# Patient Record
Sex: MALE | Race: WHITE | Employment: FULL TIME | URBAN - METROPOLITAN AREA
[De-identification: names, ages, dates, MRNs, and addresses within clinical notes are randomized per-mention and may not be internally consistent; named-entity substitution may affect disease eponyms.]

---

## 2018-04-27 ENCOUNTER — HOSPITAL ENCOUNTER (OUTPATIENT)
Age: 58
Discharge: HOME | End: 2018-04-29
Payer: COMMERCIAL

## 2018-04-27 LAB
ALBUMIN SERPL-MCNC: 4.4 G/DL (ref 3.5–5.2)
ALP BLD-CCNC: 34 U/L (ref 40–129)
ALT SERPL-CCNC: 22 U/L (ref 0–40)
ANION GAP SERPL CALCULATED.3IONS-SCNC: 15 MMOL/L (ref 7–16)
AST SERPL-CCNC: 26 U/L (ref 0–39)
BILIRUB SERPL-MCNC: 0.4 MG/DL (ref 0–1.2)
BILIRUBIN URINE: NEGATIVE
BLOOD, URINE: NEGATIVE
BUN BLDV-MCNC: 13 MG/DL (ref 6–20)
CALCIUM SERPL-MCNC: 9.7 MG/DL (ref 8.6–10.2)
CHLORIDE BLD-SCNC: 94 MMOL/L (ref 98–107)
CLARITY: CLEAR
CO2: 30 MMOL/L (ref 22–29)
COLOR: YELLOW
CREAT SERPL-MCNC: 0.9 MG/DL (ref 0.7–1.2)
GFR AFRICAN AMERICAN: >60
GFR NON-AFRICAN AMERICAN: >60 ML/MIN/1.73
GLUCOSE BLD-MCNC: 102 MG/DL (ref 74–109)
GLUCOSE URINE: NEGATIVE MG/DL
HCT VFR BLD CALC: 39.7 % (ref 37–54)
HEMOGLOBIN: 14.2 G/DL (ref 12.5–16.5)
INR BLD: 1.1
KETONES, URINE: NEGATIVE MG/DL
LEUKOCYTE ESTERASE, URINE: NEGATIVE
MCH RBC QN AUTO: 33.9 PG (ref 26–35)
MCHC RBC AUTO-ENTMCNC: 35.8 % (ref 32–34.5)
MCV RBC AUTO: 94.7 FL (ref 80–99.9)
NITRITE, URINE: NEGATIVE
PDW BLD-RTO: 12.1 FL (ref 11.5–15)
PH UA: 6.5 (ref 5–9)
PLATELET # BLD: 461 E9/L (ref 130–450)
PMV BLD AUTO: 9.5 FL (ref 7–12)
POTASSIUM SERPL-SCNC: 3.4 MMOL/L (ref 3.5–5)
PROTEIN UA: NEGATIVE MG/DL
PROTHROMBIN TIME: 12.3 SEC (ref 9.3–12.4)
RBC # BLD: 4.19 E12/L (ref 3.8–5.8)
SODIUM BLD-SCNC: 139 MMOL/L (ref 132–146)
SPECIFIC GRAVITY UA: 1.01 (ref 1–1.03)
TOTAL PROTEIN: 7.1 G/DL (ref 6.4–8.3)
UROBILINOGEN, URINE: 0.2 E.U./DL
WBC # BLD: 10.7 E9/L (ref 4.5–11.5)

## 2018-04-27 PROCEDURE — 87088 URINE BACTERIA CULTURE: CPT

## 2018-04-27 PROCEDURE — 85027 COMPLETE CBC AUTOMATED: CPT

## 2018-04-27 PROCEDURE — 80053 COMPREHEN METABOLIC PANEL: CPT

## 2018-04-27 PROCEDURE — 81003 URINALYSIS AUTO W/O SCOPE: CPT

## 2018-04-27 PROCEDURE — 85610 PROTHROMBIN TIME: CPT

## 2018-04-29 LAB — URINE CULTURE, ROUTINE: NORMAL

## 2018-09-21 ENCOUNTER — HOSPITAL ENCOUNTER (INPATIENT)
Age: 58
LOS: 4 days | Discharge: HOME | DRG: 603 | End: 2018-09-25
Attending: EMERGENCY MEDICINE | Admitting: INTERNAL MEDICINE
Payer: COMMERCIAL

## 2018-09-21 ENCOUNTER — APPOINTMENT (OUTPATIENT)
Dept: GENERAL RADIOLOGY | Age: 58
DRG: 603 | End: 2018-09-21
Payer: COMMERCIAL

## 2018-09-21 ENCOUNTER — APPOINTMENT (OUTPATIENT)
Dept: ULTRASOUND IMAGING | Age: 58
DRG: 603 | End: 2018-09-21
Payer: COMMERCIAL

## 2018-09-21 DIAGNOSIS — E87.6 HYPOKALEMIA: ICD-10-CM

## 2018-09-21 DIAGNOSIS — L03.114 LEFT ARM CELLULITIS: Primary | ICD-10-CM

## 2018-09-21 PROBLEM — L03.90 CELLULITIS: Status: ACTIVE | Noted: 2018-09-21

## 2018-09-21 LAB
ANION GAP SERPL CALCULATED.3IONS-SCNC: 16 MMOL/L (ref 7–16)
BASOPHILS ABSOLUTE: 0 E9/L (ref 0–0.2)
BASOPHILS RELATIVE PERCENT: 0.3 % (ref 0–2)
BUN BLDV-MCNC: 14 MG/DL (ref 6–20)
CALCIUM SERPL-MCNC: 11 MG/DL (ref 8.6–10.2)
CHLORIDE BLD-SCNC: 91 MMOL/L (ref 98–107)
CO2: 29 MMOL/L (ref 22–29)
CREAT SERPL-MCNC: 0.8 MG/DL (ref 0.7–1.2)
EOSINOPHILS ABSOLUTE: 0.29 E9/L (ref 0.05–0.5)
EOSINOPHILS RELATIVE PERCENT: 1.8 % (ref 0–6)
GFR AFRICAN AMERICAN: >60
GFR NON-AFRICAN AMERICAN: >60 ML/MIN/1.73
GLUCOSE BLD-MCNC: 99 MG/DL (ref 74–109)
HCT VFR BLD CALC: 34 % (ref 37–54)
HEMOGLOBIN: 12.5 G/DL (ref 12.5–16.5)
LACTIC ACID: 0.8 MMOL/L (ref 0.5–2.2)
LYMPHOCYTES ABSOLUTE: 0.82 E9/L (ref 1.5–4)
LYMPHOCYTES RELATIVE PERCENT: 5.3 % (ref 20–42)
MCH RBC QN AUTO: 34.4 PG (ref 26–35)
MCHC RBC AUTO-ENTMCNC: 36.8 % (ref 32–34.5)
MCV RBC AUTO: 93.7 FL (ref 80–99.9)
MONOCYTES ABSOLUTE: 0.65 E9/L (ref 0.1–0.95)
MONOCYTES RELATIVE PERCENT: 3.5 % (ref 2–12)
NEUTROPHILS ABSOLUTE: 14.67 E9/L (ref 1.8–7.3)
NEUTROPHILS RELATIVE PERCENT: 89.5 % (ref 43–80)
PDW BLD-RTO: 12.4 FL (ref 11.5–15)
PLATELET # BLD: 463 E9/L (ref 130–450)
PMV BLD AUTO: 8.9 FL (ref 7–12)
POTASSIUM SERPL-SCNC: 2.7 MMOL/L (ref 3.5–5)
RBC # BLD: 3.63 E12/L (ref 3.8–5.8)
SODIUM BLD-SCNC: 136 MMOL/L (ref 132–146)
WBC # BLD: 16.3 E9/L (ref 4.5–11.5)

## 2018-09-21 PROCEDURE — 85025 COMPLETE CBC W/AUTO DIFF WBC: CPT

## 2018-09-21 PROCEDURE — 87040 BLOOD CULTURE FOR BACTERIA: CPT

## 2018-09-21 PROCEDURE — 36415 COLL VENOUS BLD VENIPUNCTURE: CPT

## 2018-09-21 PROCEDURE — 80048 BASIC METABOLIC PNL TOTAL CA: CPT

## 2018-09-21 PROCEDURE — 83605 ASSAY OF LACTIC ACID: CPT

## 2018-09-21 PROCEDURE — 93971 EXTREMITY STUDY: CPT

## 2018-09-21 PROCEDURE — 99285 EMERGENCY DEPT VISIT HI MDM: CPT

## 2018-09-21 PROCEDURE — 2580000003 HC RX 258: Performed by: PHYSICIAN ASSISTANT

## 2018-09-21 PROCEDURE — 73070 X-RAY EXAM OF ELBOW: CPT

## 2018-09-21 PROCEDURE — 6370000000 HC RX 637 (ALT 250 FOR IP): Performed by: EMERGENCY MEDICINE

## 2018-09-21 PROCEDURE — 6360000002 HC RX W HCPCS: Performed by: PHYSICIAN ASSISTANT

## 2018-09-21 PROCEDURE — 96375 TX/PRO/DX INJ NEW DRUG ADDON: CPT

## 2018-09-21 PROCEDURE — 96365 THER/PROPH/DIAG IV INF INIT: CPT

## 2018-09-21 PROCEDURE — 6370000000 HC RX 637 (ALT 250 FOR IP): Performed by: PHYSICIAN ASSISTANT

## 2018-09-21 PROCEDURE — 1200000000 HC SEMI PRIVATE

## 2018-09-21 RX ORDER — CLINDAMYCIN PHOSPHATE 900 MG/50ML
900 INJECTION INTRAVENOUS EVERY 8 HOURS
Status: DISCONTINUED | OUTPATIENT
Start: 2018-09-21 | End: 2018-09-21

## 2018-09-21 RX ORDER — BUPROPION HYDROCHLORIDE 150 MG/1
150 TABLET ORAL EVERY MORNING
COMMUNITY
End: 2020-01-21

## 2018-09-21 RX ORDER — POTASSIUM CHLORIDE 20 MEQ/1
40 TABLET, EXTENDED RELEASE ORAL ONCE
Status: COMPLETED | OUTPATIENT
Start: 2018-09-21 | End: 2018-09-21

## 2018-09-21 RX ORDER — DOXYCYCLINE HYCLATE 100 MG/1
100 CAPSULE ORAL 2 TIMES DAILY
COMMUNITY
End: 2019-12-13

## 2018-09-21 RX ORDER — IBUPROFEN 800 MG/1
800 TABLET ORAL ONCE
Status: COMPLETED | OUTPATIENT
Start: 2018-09-21 | End: 2018-09-21

## 2018-09-21 RX ORDER — DOXYCYCLINE HYCLATE 100 MG/1
100 CAPSULE ORAL ONCE
Status: COMPLETED | OUTPATIENT
Start: 2018-09-21 | End: 2018-09-21

## 2018-09-21 RX ORDER — POTASSIUM CHLORIDE 7.45 MG/ML
10 INJECTION INTRAVENOUS ONCE
Status: COMPLETED | OUTPATIENT
Start: 2018-09-21 | End: 2018-09-21

## 2018-09-21 RX ADMIN — DOXYCYCLINE HYCLATE 100 MG: 100 CAPSULE ORAL at 23:38

## 2018-09-21 RX ADMIN — POTASSIUM CHLORIDE 40 MEQ: 20 TABLET, EXTENDED RELEASE ORAL at 21:20

## 2018-09-21 RX ADMIN — POTASSIUM CHLORIDE 10 MEQ: 10 INJECTION, SOLUTION INTRAVENOUS at 21:20

## 2018-09-21 RX ADMIN — IBUPROFEN 800 MG: 800 TABLET, FILM COATED ORAL at 22:34

## 2018-09-21 RX ADMIN — CEFTRIAXONE 2 G: 2 INJECTION, POWDER, FOR SOLUTION INTRAMUSCULAR; INTRAVENOUS at 21:19

## 2018-09-21 ASSESSMENT — PAIN SCALES - GENERAL
PAINLEVEL_OUTOF10: 2
PAINLEVEL_OUTOF10: 8

## 2018-09-22 LAB
ANION GAP SERPL CALCULATED.3IONS-SCNC: 12 MMOL/L (ref 7–16)
ANTISTREPTOLYSIN-O: <20 IU/ML (ref 0–200)
BUN BLDV-MCNC: 13 MG/DL (ref 6–20)
C-REACTIVE PROTEIN: <0.1 MG/DL (ref 0–0.4)
CALCIUM IONIZED: 1.45 MMOL/L (ref 1.15–1.33)
CALCIUM SERPL-MCNC: 10.6 MG/DL (ref 8.6–10.2)
CALCIUM URINE: 1.8 MG/DL
CHLORIDE BLD-SCNC: 92 MMOL/L (ref 98–107)
CHOLESTEROL, TOTAL: 119 MG/DL (ref 0–199)
CO2: 29 MMOL/L (ref 22–29)
CREAT SERPL-MCNC: 0.8 MG/DL (ref 0.7–1.2)
GFR AFRICAN AMERICAN: >60
GFR NON-AFRICAN AMERICAN: >60 ML/MIN/1.73
GLUCOSE BLD-MCNC: 136 MG/DL (ref 74–109)
HBA1C MFR BLD: 5.2 % (ref 4–5.6)
HCT VFR BLD CALC: 31.3 % (ref 37–54)
HDLC SERPL-MCNC: 32 MG/DL
HEMOGLOBIN: 11.4 G/DL (ref 12.5–16.5)
LDL CHOLESTEROL CALCULATED: 50 MG/DL (ref 0–99)
MAGNESIUM: 0.7 MG/DL (ref 1.6–2.6)
MAGNESIUM: 0.7 MG/DL (ref 1.6–2.6)
MAGNESIUM: 1.1 MG/DL (ref 1.6–2.6)
MCH RBC QN AUTO: 34.1 PG (ref 26–35)
MCHC RBC AUTO-ENTMCNC: 36.4 % (ref 32–34.5)
MCV RBC AUTO: 93.7 FL (ref 80–99.9)
PARATHYROID HORMONE INTACT: 11 PG/ML (ref 15–65)
PARATHYROID HORMONE INTACT: 12 PG/ML (ref 15–65)
PDW BLD-RTO: 12.2 FL (ref 11.5–15)
PHOSPHORUS: 2.9 MG/DL (ref 2.5–4.5)
PLATELET # BLD: 441 E9/L (ref 130–450)
PMV BLD AUTO: 9.2 FL (ref 7–12)
POTASSIUM REFLEX MAGNESIUM: 2.6 MMOL/L (ref 3.5–5)
POTASSIUM REFLEX MAGNESIUM: 3.5 MMOL/L (ref 3.5–5)
POTASSIUM REFLEX MAGNESIUM: 3.7 MMOL/L (ref 3.5–5)
POTASSIUM SERPL-SCNC: 2.7 MMOL/L (ref 3.5–5)
RBC # BLD: 3.34 E12/L (ref 3.8–5.8)
SEDIMENTATION RATE, ERYTHROCYTE: 87 MM/HR (ref 0–15)
SODIUM BLD-SCNC: 133 MMOL/L (ref 132–146)
TRIGL SERPL-MCNC: 183 MG/DL (ref 0–149)
TSH SERPL DL<=0.05 MIU/L-ACNC: 1.88 UIU/ML (ref 0.27–4.2)
VLDLC SERPL CALC-MCNC: 37 MG/DL
WBC # BLD: 13.2 E9/L (ref 4.5–11.5)

## 2018-09-22 PROCEDURE — 80061 LIPID PANEL: CPT

## 2018-09-22 PROCEDURE — 83036 HEMOGLOBIN GLYCOSYLATED A1C: CPT

## 2018-09-22 PROCEDURE — 83970 ASSAY OF PARATHORMONE: CPT

## 2018-09-22 PROCEDURE — 87081 CULTURE SCREEN ONLY: CPT

## 2018-09-22 PROCEDURE — 84443 ASSAY THYROID STIM HORMONE: CPT

## 2018-09-22 PROCEDURE — 84132 ASSAY OF SERUM POTASSIUM: CPT

## 2018-09-22 PROCEDURE — 6370000000 HC RX 637 (ALT 250 FOR IP): Performed by: INTERNAL MEDICINE

## 2018-09-22 PROCEDURE — 2580000003 HC RX 258: Performed by: INTERNAL MEDICINE

## 2018-09-22 PROCEDURE — 85027 COMPLETE CBC AUTOMATED: CPT

## 2018-09-22 PROCEDURE — 82310 ASSAY OF CALCIUM: CPT

## 2018-09-22 PROCEDURE — 1200000000 HC SEMI PRIVATE

## 2018-09-22 PROCEDURE — 2500000003 HC RX 250 WO HCPCS: Performed by: INTERNAL MEDICINE

## 2018-09-22 PROCEDURE — 99232 SBSQ HOSP IP/OBS MODERATE 35: CPT | Performed by: ORTHOPAEDIC SURGERY

## 2018-09-22 PROCEDURE — 84100 ASSAY OF PHOSPHORUS: CPT

## 2018-09-22 PROCEDURE — 86140 C-REACTIVE PROTEIN: CPT

## 2018-09-22 PROCEDURE — 6360000002 HC RX W HCPCS: Performed by: INTERNAL MEDICINE

## 2018-09-22 PROCEDURE — 80048 BASIC METABOLIC PNL TOTAL CA: CPT

## 2018-09-22 PROCEDURE — 82330 ASSAY OF CALCIUM: CPT

## 2018-09-22 PROCEDURE — 83735 ASSAY OF MAGNESIUM: CPT

## 2018-09-22 PROCEDURE — 36415 COLL VENOUS BLD VENIPUNCTURE: CPT

## 2018-09-22 PROCEDURE — 86060 ANTISTREPTOLYSIN O TITER: CPT

## 2018-09-22 PROCEDURE — 85651 RBC SED RATE NONAUTOMATED: CPT

## 2018-09-22 RX ORDER — BUPROPION HYDROCHLORIDE 150 MG/1
150 TABLET ORAL EVERY MORNING
Status: DISCONTINUED | OUTPATIENT
Start: 2018-09-22 | End: 2018-09-25 | Stop reason: HOSPADM

## 2018-09-22 RX ORDER — LOSARTAN POTASSIUM AND HYDROCHLOROTHIAZIDE 25; 100 MG/1; MG/1
1 TABLET ORAL DAILY
Status: DISCONTINUED | OUTPATIENT
Start: 2018-09-22 | End: 2018-09-22 | Stop reason: CLARIF

## 2018-09-22 RX ORDER — DOXYLAMINE SUCCINATE 25 MG/1
1 TABLET ORAL PRN
Status: DISCONTINUED | OUTPATIENT
Start: 2018-09-22 | End: 2018-09-25 | Stop reason: HOSPADM

## 2018-09-22 RX ORDER — FENOFIBRATE 160 MG/1
160 TABLET ORAL DAILY
Status: DISCONTINUED | OUTPATIENT
Start: 2018-09-22 | End: 2018-09-25 | Stop reason: HOSPADM

## 2018-09-22 RX ORDER — LOSARTAN POTASSIUM 100 MG/1
100 TABLET ORAL DAILY
Status: DISCONTINUED | OUTPATIENT
Start: 2018-09-22 | End: 2018-09-25 | Stop reason: HOSPADM

## 2018-09-22 RX ORDER — POTASSIUM CHLORIDE 7.45 MG/ML
10 INJECTION INTRAVENOUS PRN
Status: DISCONTINUED | OUTPATIENT
Start: 2018-09-22 | End: 2018-09-23

## 2018-09-22 RX ORDER — MAGNESIUM SULFATE IN WATER 40 MG/ML
2 INJECTION, SOLUTION INTRAVENOUS ONCE
Status: COMPLETED | OUTPATIENT
Start: 2018-09-22 | End: 2018-09-22

## 2018-09-22 RX ORDER — ACETAMINOPHEN 325 MG/1
650 TABLET ORAL EVERY 4 HOURS PRN
Status: DISCONTINUED | OUTPATIENT
Start: 2018-09-22 | End: 2018-09-25 | Stop reason: HOSPADM

## 2018-09-22 RX ORDER — SODIUM CHLORIDE 0.9 % (FLUSH) 0.9 %
10 SYRINGE (ML) INJECTION PRN
Status: DISCONTINUED | OUTPATIENT
Start: 2018-09-22 | End: 2018-09-25 | Stop reason: HOSPADM

## 2018-09-22 RX ORDER — ONDANSETRON 2 MG/ML
4 INJECTION INTRAMUSCULAR; INTRAVENOUS EVERY 6 HOURS PRN
Status: DISCONTINUED | OUTPATIENT
Start: 2018-09-22 | End: 2018-09-25 | Stop reason: HOSPADM

## 2018-09-22 RX ORDER — ANALGESIC BALM 1.74; 4.06 G/29G; G/29G
OINTMENT TOPICAL PRN
Status: DISCONTINUED | OUTPATIENT
Start: 2018-09-22 | End: 2018-09-22 | Stop reason: CLARIF

## 2018-09-22 RX ORDER — CLINDAMYCIN PHOSPHATE 600 MG/50ML
600 INJECTION INTRAVENOUS EVERY 8 HOURS
Status: DISCONTINUED | OUTPATIENT
Start: 2018-09-22 | End: 2018-09-24

## 2018-09-22 RX ORDER — IBUPROFEN 800 MG/1
800 TABLET ORAL EVERY 6 HOURS PRN
Status: DISCONTINUED | OUTPATIENT
Start: 2018-09-22 | End: 2018-09-25 | Stop reason: HOSPADM

## 2018-09-22 RX ORDER — POTASSIUM CHLORIDE 20 MEQ/1
40 TABLET, EXTENDED RELEASE ORAL ONCE
Status: COMPLETED | OUTPATIENT
Start: 2018-09-22 | End: 2018-09-22

## 2018-09-22 RX ORDER — SODIUM CHLORIDE 0.9 % (FLUSH) 0.9 %
10 SYRINGE (ML) INJECTION EVERY 12 HOURS SCHEDULED
Status: DISCONTINUED | OUTPATIENT
Start: 2018-09-22 | End: 2018-09-25 | Stop reason: HOSPADM

## 2018-09-22 RX ORDER — SIMVASTATIN 40 MG
40 TABLET ORAL NIGHTLY
Status: DISCONTINUED | OUTPATIENT
Start: 2018-09-22 | End: 2018-09-25 | Stop reason: HOSPADM

## 2018-09-22 RX ORDER — PANTOPRAZOLE SODIUM 40 MG/1
40 TABLET, DELAYED RELEASE ORAL
Status: DISCONTINUED | OUTPATIENT
Start: 2018-09-22 | End: 2018-09-24

## 2018-09-22 RX ORDER — HYDROCHLOROTHIAZIDE 25 MG/1
25 TABLET ORAL DAILY
Status: DISCONTINUED | OUTPATIENT
Start: 2018-09-22 | End: 2018-09-22

## 2018-09-22 RX ADMIN — POTASSIUM CHLORIDE 40 MEQ: 20 TABLET, EXTENDED RELEASE ORAL at 06:47

## 2018-09-22 RX ADMIN — CLINDAMYCIN PHOSPHATE 600 MG: 600 INJECTION, SOLUTION INTRAVENOUS at 01:37

## 2018-09-22 RX ADMIN — FENOFIBRATE 160 MG: 160 TABLET ORAL at 09:48

## 2018-09-22 RX ADMIN — HYDROCHLOROTHIAZIDE 25 MG: 25 TABLET ORAL at 09:44

## 2018-09-22 RX ADMIN — IBUPROFEN 800 MG: 800 TABLET ORAL at 02:55

## 2018-09-22 RX ADMIN — CLINDAMYCIN PHOSPHATE 600 MG: 600 INJECTION, SOLUTION INTRAVENOUS at 09:46

## 2018-09-22 RX ADMIN — LOSARTAN POTASSIUM 100 MG: 100 TABLET ORAL at 09:45

## 2018-09-22 RX ADMIN — POTASSIUM CHLORIDE 40 MEQ: 20 TABLET, EXTENDED RELEASE ORAL at 12:08

## 2018-09-22 RX ADMIN — POTASSIUM CHLORIDE 10 MEQ: 10 INJECTION, SOLUTION INTRAVENOUS at 09:58

## 2018-09-22 RX ADMIN — Medication 10 ML: at 10:20

## 2018-09-22 RX ADMIN — BUPROPION HYDROCHLORIDE 150 MG: 300 TABLET, FILM COATED, EXTENDED RELEASE ORAL at 09:47

## 2018-09-22 RX ADMIN — POTASSIUM CHLORIDE 10 MEQ: 10 INJECTION, SOLUTION INTRAVENOUS at 17:52

## 2018-09-22 RX ADMIN — VANCOMYCIN HYDROCHLORIDE 1750 MG: 10 INJECTION, POWDER, LYOPHILIZED, FOR SOLUTION INTRAVENOUS at 22:26

## 2018-09-22 RX ADMIN — POTASSIUM CHLORIDE 10 MEQ: 10 INJECTION, SOLUTION INTRAVENOUS at 20:25

## 2018-09-22 RX ADMIN — IBUPROFEN 800 MG: 800 TABLET ORAL at 09:45

## 2018-09-22 RX ADMIN — ENOXAPARIN SODIUM 40 MG: 40 INJECTION SUBCUTANEOUS at 09:45

## 2018-09-22 RX ADMIN — POTASSIUM CHLORIDE 10 MEQ: 10 INJECTION, SOLUTION INTRAVENOUS at 12:05

## 2018-09-22 RX ADMIN — POTASSIUM CHLORIDE 10 MEQ: 10 INJECTION, SOLUTION INTRAVENOUS at 08:28

## 2018-09-22 RX ADMIN — SIMVASTATIN 40 MG: 40 TABLET, FILM COATED ORAL at 20:25

## 2018-09-22 RX ADMIN — IBUPROFEN 800 MG: 800 TABLET ORAL at 18:03

## 2018-09-22 RX ADMIN — Medication 2000 MG: at 04:00

## 2018-09-22 RX ADMIN — PANTOPRAZOLE SODIUM 40 MG: 40 TABLET, DELAYED RELEASE ORAL at 06:47

## 2018-09-22 RX ADMIN — POTASSIUM CHLORIDE 10 MEQ: 10 INJECTION, SOLUTION INTRAVENOUS at 15:21

## 2018-09-22 RX ADMIN — CLINDAMYCIN PHOSPHATE 600 MG: 600 INJECTION, SOLUTION INTRAVENOUS at 18:01

## 2018-09-22 RX ADMIN — MAGNESIUM SULFATE HEPTAHYDRATE 2 G: 40 INJECTION, SOLUTION INTRAVENOUS at 06:47

## 2018-09-22 ASSESSMENT — PAIN SCALES - GENERAL
PAINLEVEL_OUTOF10: 5
PAINLEVEL_OUTOF10: 3
PAINLEVEL_OUTOF10: 6
PAINLEVEL_OUTOF10: 0
PAINLEVEL_OUTOF10: 4
PAINLEVEL_OUTOF10: 6

## 2018-09-22 ASSESSMENT — PAIN DESCRIPTION - PROGRESSION: CLINICAL_PROGRESSION: GRADUALLY IMPROVING

## 2018-09-22 ASSESSMENT — PAIN DESCRIPTION - ONSET: ONSET: ON-GOING

## 2018-09-22 ASSESSMENT — PAIN DESCRIPTION - ORIENTATION: ORIENTATION: LEFT

## 2018-09-22 ASSESSMENT — PAIN DESCRIPTION - FREQUENCY
FREQUENCY: CONTINUOUS
FREQUENCY: INTERMITTENT

## 2018-09-22 ASSESSMENT — PAIN DESCRIPTION - DESCRIPTORS
DESCRIPTORS: ACHING
DESCRIPTORS: ACHING;DISCOMFORT

## 2018-09-22 ASSESSMENT — PAIN DESCRIPTION - LOCATION
LOCATION: ARM
LOCATION: BACK

## 2018-09-22 ASSESSMENT — PAIN DESCRIPTION - PAIN TYPE
TYPE: CHRONIC PAIN
TYPE: ACUTE PAIN

## 2018-09-22 NOTE — CONSULTS
Department of Orthopedic Surgery  Resident Consult Note          Reason for Consult:  Left arm cellulitis and bursitis    HISTORY OF PRESENT ILLNESS:       Patient is a 60 y.o. male who presents with a left arm and forearm cellulitis and swelling to his olecranon bursa. He noticed some dry flaky skin on his elbow that ended up splitting open. He had some swelling and redness and went to his family physician where he was put on doxycycline. He has failed to improve with oral antibiotics and presented to the ER. He complains of mild pain to elbow and forearm. 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 He deneis numbness and tingling. Denies the use of anticoagulants. He is a pt of Dr. Mciheline Hurst. Denies any other orthopedic complaints at this time. Past Medical History:        Diagnosis Date    Acid reflux     GERD (gastroesophageal reflux disease)     Hyperlipidemia     Hypertension      Past Surgical History:        Procedure Laterality Date    BACK SURGERY      May 4, 2018 decompression with Dr. Gina Cuevas.      CARPAL TUNNEL RELEASE Left 1 24 14    CARPAL TUNNEL RELEASE      left    COLONOSCOPY      FRACTURE SURGERY      arm    FRACTURE SURGERY      left arm    HERNIA REPAIR      HERNIA REPAIR      x2 child    KNEE ARTHROSCOPY Left 02/20/15     Current Medications:   Current Facility-Administered Medications: buPROPion (WELLBUTRIN XL) extended release tablet 150 mg, 150 mg, Oral, QAM  fenofibrate tablet 160 mg, 160 mg, Oral, Daily  ibuprofen (ADVIL;MOTRIN) tablet 800 mg, 800 mg, Oral, Q6H PRN  pantoprazole (PROTONIX) tablet 40 mg, 40 mg, Oral, QAM AC  simvastatin (ZOCOR) tablet 40 mg, 40 mg, Oral, Nightly  sodium chloride flush 0.9 % injection 10 mL, 10 mL, Intravenous, 2 times per day  sodium chloride flush 0.9 % injection 10 mL, 10 mL, Intravenous, PRN  magnesium hydroxide (MILK OF MAGNESIA) 400 MG/5ML suspension 30 mL, 30 mL, Oral, Daily PRN  ondansetron (ZOFRAN) injection 4 mg, 4 mg,

## 2018-09-22 NOTE — PROGRESS NOTES
Pharmacy Consultation Note  (Antibiotic Dosing and Monitoring)    Initial consult date: 2018   Consulting physician: Dr. Gil Jay  Drug(s): vancomycin   Indication: L arm/forearm cellulitis  Age/Gender IBW DW  Allergy Information   60 y.o. /M; 185.4 cm, 113.4 kg 80 kg 93.4 kg  Patient has no known allergies. Date  WBC BUN/CR Drug/Dose Time   Given Level(s)   (Time) Comments        13.2   13/0.8 vancomycin 2000 mg x1    vancomycin 1750 mg q12h 0400    <1800>                                  Estimated Creatinine Clearance: 131 mL/min (based on SCr of 0.8 mg/dL). Intake/Output Summary (Last 24 hours) at 18 0808  Last data filed at 18 0701   Gross per 24 hour   Intake              100 ml   Output                0 ml   Net              100 ml       Temp max: Temp (24hrs), Av.8 °F (37.1 °C), Min:98.6 °F (37 °C), Max:99.1 °F (37.3 °C)    Cultures:  available culture and sensitivity results were reviewed in [facility]    Assessment:  · Consulted by Dr. Catalina Cook to dose/monitor vancomycin. · Goal trough level:  15-20 mcg/mL. · 59 yo/M admitted for L elbow swelling and pain x3 days. Was on PO doxycycline starting on . · Empiric ATBs in ED (ceftriaxone and doxycycline) for cellulitis. Vancomycin and clindamycin started on admission. · Received vanco 2000 mg x1 @ 0400 on . Plan:  · Start vancomycin 1750 mg q12h tonight. · Will check vancomycin trough level when steady state is attained if vanco continues. · Pharmacist will follow and monitor/adjust dosing as necessary.     Jenny Yusuf PharmD  2018  8:12 AM  Pager: [phone]

## 2018-09-22 NOTE — H&P
(PREVACID PO) Take 15 mg by mouth daily. Historical Provider, MD   Glucosamine-Chondroit-Vit C-Mn (GLUCOSAMINE 1500 COMPLEX) CAPS Take  by mouth daily. Historical Provider, MD   oxyCODONE-acetaminophen (PERCOCET) 5-325 MG per tablet Take 1 tablet by mouth every 6 hours as needed for Pain for up to 60 doses. 1/12/15   Radhika Coffey, DO   omeprazole (PRILOSEC) 40 MG capsule  1/9/15   Historical Provider, MD   furosemide (LASIX) 20 MG tablet  12/22/14   Historical Provider, MD   TRILIPIX 135 MG CPDR Take 135 mg by mouth daily. 10/9/13   Historical Provider, MD   ZETIA 10 MG tablet Take 10 mg by mouth daily. 10/9/13   Historical Provider, MD   simvastatin (ZOCOR) 40 MG tablet Take 40 mg by mouth nightly. 10/9/13   Historical Provider, MD       Allergies  Patient has no known allergies. Social Hx  Social History     Social History    Marital status:      Spouse name: N/A    Number of children: N/A    Years of education: N/A     Occupational History    Not on file. Social History Main Topics    Smoking status: Current Every Day Smoker     Packs/day: 1.00     Years: 25.00    Smokeless tobacco: Not on file    Alcohol use Yes      Comment: social    Drug use: No    Sexual activity: Not on file     Other Topics Concern    Not on file     Social History Narrative    ** Merged History Encounter **            Review of Systems  All bolded are positive; please see HPI  General:  Fever, chills, diaphoresis, fatigue, malaise, night sweats, weight loss  Psychological:  Anxiety, disorientation, hallucinations. ENT:  Epistaxis, headaches, vertigo, visual changes. Cardiovascular:  Chest pain, irregular heartbeats, palpitations, paroxysmal nocturnal dyspnea. Respiratory:  Shortness of breath, coughing, sputum production, hemoptysis, wheezing, orthopnea.   Gastrointestinal:  Nausea, vomiting, diarrhea, heartburn, constipation, abdominal pain, hematemesis, hematochezia, melena, acholic 35.0 pg    MCHC 36.8 (H) 32.0 - 34.5 %    RDW 12.4 11.5 - 15.0 fL    Platelets 654 (H) 947 - 450 E9/L    MPV 8.9 7.0 - 12.0 fL    Neutrophils % 89.5 (H) 43.0 - 80.0 %    Lymphocytes % 5.3 (L) 20.0 - 42.0 %    Monocytes % 3.5 2.0 - 12.0 %    Eosinophils % 1.8 0.0 - 6.0 %    Basophils % 0.3 0.0 - 2.0 %    Neutrophils # 14.67 (H) 1.80 - 7.30 E9/L    Lymphocytes # 0.82 (L) 1.50 - 4.00 E9/L    Monocytes # 0.65 0.10 - 0.95 E9/L    Eosinophils # 0.29 0.05 - 0.50 E9/L    Basophils # 0.00 0.00 - 0.20 A6/S   Basic Metabolic Panel    Collection Time: 09/21/18  8:19 PM   Result Value Ref Range    Sodium 136 132 - 146 mmol/L    Potassium 2.7 (LL) 3.5 - 5.0 mmol/L    Chloride 91 (L) 98 - 107 mmol/L    CO2 29 22 - 29 mmol/L    Anion Gap 16 7 - 16 mmol/L    Glucose 99 74 - 109 mg/dL    BUN 14 6 - 20 mg/dL    CREATININE 0.8 0.7 - 1.2 mg/dL    GFR Non-African American >60 >=60 mL/min/1.73    GFR African American >60     Calcium 11.0 (H) 8.6 - 10.2 mg/dL   Lactic Acid, Plasma    Collection Time: 09/21/18  8:19 PM   Result Value Ref Range    Lactic Acid 0.8 0.5 - 2.2 mmol/L       Imaging  Xr Elbow Left (2 Views)    Result Date: 9/21/2018  Location:200 Exam: XR ELBOW LEFT (2 VIEWS) History:  Cellulitis. Elbow pain and swelling. Patient had elbow scab that became infected Findings: Comparison: None. 2 views of the right elbow were obtained. No acute fracture or dislocation is seen. No radiopaque foreign body or abnormal soft tissue calcification is seen. No focal lytic or blastic osseous lesion or periosteal reaction is seen.  No elbow joint effusion is identified     No acute fracture or dislocation or plain film radiographic evidence of osteomyelitis    Us Dup Upper Extremity Left Venous    Result Date: 9/21/2018  Location:200 Exam: US DUP UPPER EXTREMITY LEFT VENOUS Indications: Left arm swelling, pain, and redness Technique: Using real-time, color, and Doppler spectral waveform analysis, ultrasound examination of the  left jugular, the  left subclavian, the left axillary vein, and the deep veins of the left arm was performed. Findings: Comparison: None The left jugular vein is grossly patent and demonstrates flow within it. The  left axillary and subclavian veins are grossly patent, without an echogenic focus within them. There is evidence of color and Doppler flow within these vessels. The left axillary vein compresses fully. There is gross patency of the left common carotid artery. The  left cephalic, basilic and brachial veins are identified. These vessels are grossly patent, without an echogenic focus within them. These vessels compress fully and demonstrate color and Doppler flow within them. There is gross patency of the left antecubital vein. Left radial and left ulnar veins are grossly patent, demonstrate flow within them, compress fully, and do not demonstrate findings to suggest a thrombus. 1. No evidence of thrombus within left jugular, subclavian, or axillary veins. 2. No evidence of thrombus within deep veins of left arm           Assessment and Plan  Patient is a 60 y.o. male who presented with left arm cellulitis. The active problem list is as follows:    · Left arm cellulitis  · Hypokalemia  · Hypercalcemia  · Hypertension  · Hyperlipidemia  · Thrombocytosis  · GERD    - Ortho consulted from ER  - PTH  - Urine calcium  - Blood cultures  - Tylenol for fever  - CRP, sed rate, ASO, MRSA sreen  - Vancomycin and clinda  - Repeat potassium    · Routine labs in the morning. · DVT prophylaxis. · Please see orders for further management and care. Angelito Tate DO, PGYII  10:54 PM  9/21/2018    The chart was reviewed and the patient was seen and examined. The case was discussed in detail with the resident and agree with current impressions and plan.     Sky Duran D.O.  9:29 AM  9/22/2018

## 2018-09-22 NOTE — H&P
[address] [facility]  Internal Medicine  -Resident History & Physical-    PCP:  Angela Weinberg DO  Admitting Physician:  Raman Bassett DO  Consultants:  Ortho consulted from ER  Chief Complaint:    Chief Complaint   Patient presents with    Cellulitis     pt had patch of dry skin on elbow that broke open and now arm is red swollen. Pt was seen at [facility]  and is on doxy and getting worse. History of Present Illness  Robin Blackburn is a 60 y.o. male who presents to [facility] ER complaining of left arm celulitis. Robin Blackburn has a past medical history that includes hypertension, hyperlipidemia, GERD. Nuha Neil presents to ER with complaint of swelling of his left arm. He states that he had a patch of dry skin on his elbow which broke open three days ago and now his arm has gotten worse. He saw his PCP yesterday who placed him on doxycycline with no improvement. He states his arm today got significantly worse. He denies any IV drug abuse. He denies any fever, chills, or pain. He states a few days ago he did take a diuretic which he takes PRN for swelling. He states that day he had a large amount of urination at this time. ER Course  Upon presentation to the ER, routine labwork was performed which revealed Potassium of 2.7, calcium of 11.0, WBC of 16.3, platelets of 463. Imaging results are as outlined below in the Imaging section of this note. Upon arrival to the ER, patient was 131/79. The patient received rocephin in the emergency room and was admitted to med/surg under the care of Dr. Maryjo Zapata.     Last Hospital Admission -   None    Last Echocardiogram -   None     ED TRIAGE VITALS  BP: 130/71, Temp: 98.6 °F (37 °C), Pulse: 86, Resp: 18, SpO2: 96 %    Vitals:    09/21/18 2123 09/21/18 2233 09/22/18 0000 09/22/18 0807   BP: 132/86 124/78 132/76 130/71   Pulse: 96 80 80 86   Resp: 16 16 16 18   Temp: 98.7 °F (37.1 °C) 98.6 °F (37 °C) 99.1 °F (37.3 °C) 98.6 °F (37 °C)

## 2018-09-22 NOTE — PROGRESS NOTES
Pharmacy Consultation Note  (Antibiotic Dosing and Monitoring)      Pharmacy is following for Vancomycin dosing. Allergies:  Patient has no known allergies. 60 y.o. male    Ht Readings from Last 1 Encounters:   09/21/18 6' 1\" (1.854 m)     Wt Readings from Last 1 Encounters:   09/21/18 250 lb (113.4 kg)       Recent Labs      09/21/18 2019   WBC  16.3*       Recent Labs      09/21/18 2019   BUN  14   CREATININE  0.8       Estimated Creatinine Clearance: 131 mL/min (based on SCr of 0.8 mg/dL).       Intake/Output Summary (Last 24 hours) at 09/22/18 0041  Last data filed at 09/21/18 2234   Gross per 24 hour   Intake 100 ml   Output 0 ml   Net 100 ml       Cultures:  available culture and sensitivity results were reviewed in EP[facility]      Assessment:  · Consulted by Dr. Aisha Johansen DO to dose/monitor vancomycin  · Estimated CrCl = 112 mL/min  · Goal trough level = 15-20 mcg/mL    Plan:  · Will initiate vancomycin at a dose of  2000 mg  ( 17.5 mg/kg ) once  · Monitor renal function   · Clinical pharmacy will follow up and complete full consult note      Serena Em [facility] 9/22/2018 12:41 AM

## 2018-09-22 NOTE — ED PROVIDER NOTES
this time and they are agreeable with the plan. Assessment      1. Left arm cellulitis    2. Hypokalemia      Plan   Admit to med/surg floor  Patient condition is good    New Medications     New Prescriptions    No medications on file     Electronically signed by DARCIE Burgos   DD: 9/21/18  **This report was transcribed using voice recognition software. Every effort was made to ensure accuracy; however, inadvertent computerized transcription errors may be present.   END OF ED PROVIDER NOTE       Georgette Burgos  09/21/18 8760

## 2018-09-23 LAB
ALBUMIN SERPL-MCNC: 3.4 G/DL (ref 3.5–5.2)
ALP BLD-CCNC: 37 U/L (ref 40–129)
ALT SERPL-CCNC: 14 U/L (ref 0–40)
ANION GAP SERPL CALCULATED.3IONS-SCNC: 11 MMOL/L (ref 7–16)
AST SERPL-CCNC: 14 U/L (ref 0–39)
BASOPHILS ABSOLUTE: 0.05 E9/L (ref 0–0.2)
BASOPHILS RELATIVE PERCENT: 0.5 % (ref 0–2)
BILIRUB SERPL-MCNC: 0.3 MG/DL (ref 0–1.2)
BUN BLDV-MCNC: 12 MG/DL (ref 6–20)
CALCIUM SERPL-MCNC: 9.8 MG/DL (ref 8.6–10.2)
CHLORIDE BLD-SCNC: 97 MMOL/L (ref 98–107)
CO2: 27 MMOL/L (ref 22–29)
CREAT SERPL-MCNC: 0.9 MG/DL (ref 0.7–1.2)
EOSINOPHILS ABSOLUTE: 0.38 E9/L (ref 0.05–0.5)
EOSINOPHILS RELATIVE PERCENT: 4.1 % (ref 0–6)
GFR AFRICAN AMERICAN: >60
GFR NON-AFRICAN AMERICAN: >60 ML/MIN/1.73
GLUCOSE BLD-MCNC: 134 MG/DL (ref 74–109)
HCT VFR BLD CALC: 32.6 % (ref 37–54)
HEMOGLOBIN: 11.6 G/DL (ref 12.5–16.5)
IMMATURE GRANULOCYTES #: 0.05 E9/L
IMMATURE GRANULOCYTES %: 0.5 % (ref 0–5)
LYMPHOCYTES ABSOLUTE: 1.5 E9/L (ref 1.5–4)
LYMPHOCYTES RELATIVE PERCENT: 16 % (ref 20–42)
MAGNESIUM: 1.5 MG/DL (ref 1.6–2.6)
MCH RBC QN AUTO: 34 PG (ref 26–35)
MCHC RBC AUTO-ENTMCNC: 35.6 % (ref 32–34.5)
MCV RBC AUTO: 95.6 FL (ref 80–99.9)
MONOCYTES ABSOLUTE: 0.99 E9/L (ref 0.1–0.95)
MONOCYTES RELATIVE PERCENT: 10.6 % (ref 2–12)
MRSA CULTURE ONLY: NORMAL
NEUTROPHILS ABSOLUTE: 6.39 E9/L (ref 1.8–7.3)
NEUTROPHILS RELATIVE PERCENT: 68.3 % (ref 43–80)
PDW BLD-RTO: 12.3 FL (ref 11.5–15)
PLATELET # BLD: 489 E9/L (ref 130–450)
PMV BLD AUTO: 8.9 FL (ref 7–12)
POTASSIUM SERPL-SCNC: 3.3 MMOL/L (ref 3.5–5)
RBC # BLD: 3.41 E12/L (ref 3.8–5.8)
SODIUM BLD-SCNC: 135 MMOL/L (ref 132–146)
TOTAL PROTEIN: 6.7 G/DL (ref 6.4–8.3)
WBC # BLD: 9.4 E9/L (ref 4.5–11.5)

## 2018-09-23 PROCEDURE — 2500000003 HC RX 250 WO HCPCS: Performed by: INTERNAL MEDICINE

## 2018-09-23 PROCEDURE — 36415 COLL VENOUS BLD VENIPUNCTURE: CPT

## 2018-09-23 PROCEDURE — 6370000000 HC RX 637 (ALT 250 FOR IP): Performed by: INTERNAL MEDICINE

## 2018-09-23 PROCEDURE — 80053 COMPREHEN METABOLIC PANEL: CPT

## 2018-09-23 PROCEDURE — 2580000003 HC RX 258: Performed by: INTERNAL MEDICINE

## 2018-09-23 PROCEDURE — 6360000002 HC RX W HCPCS: Performed by: INTERNAL MEDICINE

## 2018-09-23 PROCEDURE — 1200000000 HC SEMI PRIVATE

## 2018-09-23 PROCEDURE — 83735 ASSAY OF MAGNESIUM: CPT

## 2018-09-23 PROCEDURE — 85025 COMPLETE CBC W/AUTO DIFF WBC: CPT

## 2018-09-23 RX ORDER — MAGNESIUM SULFATE IN WATER 40 MG/ML
2 INJECTION, SOLUTION INTRAVENOUS ONCE
Status: COMPLETED | OUTPATIENT
Start: 2018-09-23 | End: 2018-09-23

## 2018-09-23 RX ORDER — POTASSIUM CHLORIDE 20 MEQ/1
20 TABLET, EXTENDED RELEASE ORAL DAILY
Status: DISCONTINUED | OUTPATIENT
Start: 2018-09-23 | End: 2018-09-24

## 2018-09-23 RX ORDER — LANOLIN ALCOHOL/MO/W.PET/CERES
400 CREAM (GRAM) TOPICAL DAILY
Status: DISCONTINUED | OUTPATIENT
Start: 2018-09-23 | End: 2018-09-24

## 2018-09-23 RX ADMIN — CLINDAMYCIN PHOSPHATE 600 MG: 600 INJECTION, SOLUTION INTRAVENOUS at 09:53

## 2018-09-23 RX ADMIN — Medication 10 ML: at 09:57

## 2018-09-23 RX ADMIN — BUPROPION HYDROCHLORIDE 150 MG: 300 TABLET, FILM COATED, EXTENDED RELEASE ORAL at 09:58

## 2018-09-23 RX ADMIN — VANCOMYCIN HYDROCHLORIDE 1750 MG: 10 INJECTION, POWDER, LYOPHILIZED, FOR SOLUTION INTRAVENOUS at 22:02

## 2018-09-23 RX ADMIN — IBUPROFEN 800 MG: 800 TABLET ORAL at 06:55

## 2018-09-23 RX ADMIN — IBUPROFEN 800 MG: 800 TABLET ORAL at 19:52

## 2018-09-23 RX ADMIN — Medication 10 ML: at 19:52

## 2018-09-23 RX ADMIN — MAGNESIUM SULFATE HEPTAHYDRATE 2 G: 40 INJECTION, SOLUTION INTRAVENOUS at 11:22

## 2018-09-23 RX ADMIN — Medication 400 MG: at 11:35

## 2018-09-23 RX ADMIN — IBUPROFEN 800 MG: 800 TABLET ORAL at 13:30

## 2018-09-23 RX ADMIN — LOSARTAN POTASSIUM 100 MG: 100 TABLET ORAL at 09:58

## 2018-09-23 RX ADMIN — FENOFIBRATE 160 MG: 160 TABLET ORAL at 09:58

## 2018-09-23 RX ADMIN — SIMVASTATIN 40 MG: 40 TABLET, FILM COATED ORAL at 19:52

## 2018-09-23 RX ADMIN — ENOXAPARIN SODIUM 40 MG: 40 INJECTION SUBCUTANEOUS at 09:58

## 2018-09-23 RX ADMIN — PANTOPRAZOLE SODIUM 40 MG: 40 TABLET, DELAYED RELEASE ORAL at 06:49

## 2018-09-23 RX ADMIN — VANCOMYCIN HYDROCHLORIDE 1750 MG: 10 INJECTION, POWDER, LYOPHILIZED, FOR SOLUTION INTRAVENOUS at 14:38

## 2018-09-23 RX ADMIN — POTASSIUM CHLORIDE 20 MEQ: 20 TABLET, EXTENDED RELEASE ORAL at 11:35

## 2018-09-23 RX ADMIN — IBUPROFEN 800 MG: 800 TABLET ORAL at 00:44

## 2018-09-23 RX ADMIN — CLINDAMYCIN PHOSPHATE 600 MG: 600 INJECTION, SOLUTION INTRAVENOUS at 00:44

## 2018-09-23 RX ADMIN — CLINDAMYCIN PHOSPHATE 600 MG: 600 INJECTION, SOLUTION INTRAVENOUS at 17:47

## 2018-09-23 ASSESSMENT — PAIN DESCRIPTION - LOCATION
LOCATION: BACK
LOCATION: BACK

## 2018-09-23 ASSESSMENT — PAIN SCALES - GENERAL
PAINLEVEL_OUTOF10: 6
PAINLEVEL_OUTOF10: 3

## 2018-09-23 ASSESSMENT — PAIN DESCRIPTION - DESCRIPTORS
DESCRIPTORS: ACHING;DISCOMFORT
DESCRIPTORS: DISCOMFORT

## 2018-09-23 ASSESSMENT — PAIN DESCRIPTION - PAIN TYPE
TYPE: CHRONIC PAIN
TYPE: CHRONIC PAIN

## 2018-09-23 ASSESSMENT — PAIN DESCRIPTION - ORIENTATION: ORIENTATION: LOWER

## 2018-09-23 ASSESSMENT — PAIN DESCRIPTION - ONSET: ONSET: ON-GOING

## 2018-09-23 ASSESSMENT — PAIN DESCRIPTION - FREQUENCY: FREQUENCY: CONTINUOUS

## 2018-09-23 NOTE — PROGRESS NOTES
Patient unable to maintain vascular comfort with arm in danette hook, patient is comfortable with arm elevated on multiple pillows.  Electronically signed by Susana Martínez RN on 9/23/2018 at 3:35 AM

## 2018-09-23 NOTE — PROGRESS NOTES
Patient seen and examined. Patient's left arm feels better. Potassium and magnesium are improved today. No complaints of unusual SOB, nausea, vomiting, chest pain,abd. pain, dizziness, diarrhea or constipation. BP (!) 141/75   Pulse 90   Temp 98.3 °F (36.8 °C) (Oral)   Resp 16   Ht 6' 1\" (1.854 m)   Wt 250 lb (113.4 kg)   SpO2 97%   BMI 32.98 kg/m²     HEENT: negative to gross visual examination  Heart: regular rate and rhythm  Lungs: clear  Abdomen: soft, positive bowel sounds, + abdominal fat, no hepatosplenomegaly, no guarding, rebound, rigidity  Ext: no clubbing, cyanosis,+ erythema, edema left distal upper arm and left forearm  Neuro: alert and oriented.  No gross deficits    CBC with Differential:    Lab Results   Component Value Date    WBC 9.4 09/23/2018    RBC 3.41 09/23/2018    HGB 11.6 09/23/2018    HCT 32.6 09/23/2018     09/23/2018    MCV 95.6 09/23/2018    MCH 34.0 09/23/2018    MCHC 35.6 09/23/2018    RDW 12.3 09/23/2018    LYMPHOPCT 16.0 09/23/2018    MONOPCT 10.6 09/23/2018    BASOPCT 0.5 09/23/2018    MONOSABS 0.99 09/23/2018    LYMPHSABS 1.50 09/23/2018    EOSABS 0.38 09/23/2018    BASOSABS 0.05 09/23/2018     CMP:    Lab Results   Component Value Date     09/23/2018    K 3.3 09/23/2018    K 3.5 09/22/2018    CL 97 09/23/2018    CO2 27 09/23/2018    BUN 12 09/23/2018    CREATININE 0.9 09/23/2018    GFRAA >60 09/23/2018    LABGLOM >60 09/23/2018    GLUCOSE 134 09/23/2018    GLUCOSE 94 10/24/2011    PROT 6.7 09/23/2018    LABALBU 3.4 09/23/2018    LABALBU 4.4 10/24/2011    CALCIUM 9.8 09/23/2018    BILITOT 0.3 09/23/2018    ALKPHOS 37 09/23/2018    AST 14 09/23/2018    ALT 14 09/23/2018     Magnesium:    Lab Results   Component Value Date    MG 1.5 09/23/2018     Phosphorus:    Lab Results   Component Value Date    PHOS 2.9 09/22/2018     PT/INR:    Lab Results   Component Value Date    PROTIME 12.3 04/27/2018    INR 1.1 04/27/2018     Troponin:  No results found for: TROPONINI  U/A:    Lab Results   Component Value Date    COLORU Yellow 04/27/2018    PROTEINU Negative 04/27/2018    PHUR 6.5 04/27/2018    CLARITYU Clear 04/27/2018    SPECGRAV 1.010 04/27/2018    LEUKOCYTESUR Negative 04/27/2018    UROBILINOGEN 0.2 04/27/2018    BILIRUBINUR Negative 04/27/2018    BLOODU Negative 04/27/2018    GLUCOSEU Negative 04/27/2018     HgBA1c:    Lab Results   Component Value Date    LABA1C 5.2 09/22/2018     FLP:    Lab Results   Component Value Date    TRIG 183 09/22/2018    HDL 32 09/22/2018    LDLCALC 50 09/22/2018    LABVLDL 37 09/22/2018     TSH:    Lab Results   Component Value Date    TSH 1.880 09/22/2018     LIPASE:  No results found for: LIPASE    No results for input(s): GLUMET in the last 72 hours. XR ELBOW LEFT (2 VIEWS)   Final Result   No acute fracture or dislocation or plain film   radiographic evidence of osteomyelitis      US DUP UPPER EXTREMITY LEFT VENOUS   Final Result   1. No evidence of thrombus within left jugular, subclavian, or   axillary veins. 2. No evidence of thrombus within deep veins of left arm                 magnesium oxide  400 mg Oral Daily    magnesium sulfate  2 g Intravenous Once    potassium chloride  20 mEq Oral Daily    buPROPion  150 mg Oral QAM    fenofibrate  160 mg Oral Daily    pantoprazole  40 mg Oral QAM AC    simvastatin  40 mg Oral Nightly    sodium chloride flush  10 mL Intravenous 2 times per day    enoxaparin  40 mg Subcutaneous Daily    clindamycin (CLEOCIN) IV  600 mg Intravenous Q8H    losartan  100 mg Oral Daily    vancomycin  1,750 mg Intravenous Q12H        Assessment; Active Problems:    Severe Cellulitis L arm    Hypertension    Hyperlipidemia    Severe Hypokalemia-Improved    Severe Hypomagnesemia-improved      Plan:   Magnesium and potassium supplements again today  Labs in a.m. Pending ID eval today    See orders.         Jennie Saravia DO  10:37 AM  9/23/2018

## 2018-09-24 LAB
ANION GAP SERPL CALCULATED.3IONS-SCNC: 13 MMOL/L (ref 7–16)
BUN BLDV-MCNC: 14 MG/DL (ref 6–20)
CALCIUM SERPL-MCNC: 8.8 MG/DL (ref 8.6–10.2)
CHLORIDE BLD-SCNC: 94 MMOL/L (ref 98–107)
CO2: 25 MMOL/L (ref 22–29)
CREAT SERPL-MCNC: 0.9 MG/DL (ref 0.7–1.2)
GFR AFRICAN AMERICAN: >60
GFR NON-AFRICAN AMERICAN: >60 ML/MIN/1.73
GLUCOSE BLD-MCNC: 121 MG/DL (ref 74–109)
MAGNESIUM: 1.4 MG/DL (ref 1.6–2.6)
POTASSIUM SERPL-SCNC: 3.3 MMOL/L (ref 3.5–5)
SODIUM BLD-SCNC: 132 MMOL/L (ref 132–146)
VANCOMYCIN TROUGH: 14.6 MCG/ML (ref 5–16)

## 2018-09-24 PROCEDURE — 2500000003 HC RX 250 WO HCPCS: Performed by: INTERNAL MEDICINE

## 2018-09-24 PROCEDURE — 6360000002 HC RX W HCPCS: Performed by: INTERNAL MEDICINE

## 2018-09-24 PROCEDURE — 6370000000 HC RX 637 (ALT 250 FOR IP): Performed by: INTERNAL MEDICINE

## 2018-09-24 PROCEDURE — 1200000000 HC SEMI PRIVATE

## 2018-09-24 PROCEDURE — 83735 ASSAY OF MAGNESIUM: CPT

## 2018-09-24 PROCEDURE — 2580000003 HC RX 258: Performed by: INTERNAL MEDICINE

## 2018-09-24 PROCEDURE — 6360000002 HC RX W HCPCS: Performed by: FAMILY MEDICINE

## 2018-09-24 PROCEDURE — 6370000000 HC RX 637 (ALT 250 FOR IP): Performed by: FAMILY MEDICINE

## 2018-09-24 PROCEDURE — 99222 1ST HOSP IP/OBS MODERATE 55: CPT | Performed by: ORTHOPAEDIC SURGERY

## 2018-09-24 PROCEDURE — 36415 COLL VENOUS BLD VENIPUNCTURE: CPT

## 2018-09-24 PROCEDURE — 6370000000 HC RX 637 (ALT 250 FOR IP): Performed by: SPECIALIST

## 2018-09-24 PROCEDURE — 80202 ASSAY OF VANCOMYCIN: CPT

## 2018-09-24 PROCEDURE — 80048 BASIC METABOLIC PNL TOTAL CA: CPT

## 2018-09-24 RX ORDER — POTASSIUM CHLORIDE 20 MEQ/1
20 TABLET, EXTENDED RELEASE ORAL 2 TIMES DAILY
Status: DISCONTINUED | OUTPATIENT
Start: 2018-09-24 | End: 2018-09-25 | Stop reason: HOSPADM

## 2018-09-24 RX ORDER — HYDROCODONE BITARTRATE AND ACETAMINOPHEN 5; 325 MG/1; MG/1
1 TABLET ORAL EVERY 6 HOURS PRN
Status: DISCONTINUED | OUTPATIENT
Start: 2018-09-24 | End: 2018-09-25 | Stop reason: HOSPADM

## 2018-09-24 RX ORDER — CEPHALEXIN 500 MG/1
500 CAPSULE ORAL EVERY 6 HOURS SCHEDULED
Status: DISCONTINUED | OUTPATIENT
Start: 2018-09-24 | End: 2018-09-25 | Stop reason: HOSPADM

## 2018-09-24 RX ORDER — CYCLOBENZAPRINE HCL 10 MG
10 TABLET ORAL 3 TIMES DAILY PRN
Status: DISCONTINUED | OUTPATIENT
Start: 2018-09-24 | End: 2018-09-25 | Stop reason: HOSPADM

## 2018-09-24 RX ORDER — LANOLIN ALCOHOL/MO/W.PET/CERES
400 CREAM (GRAM) TOPICAL 2 TIMES DAILY
Status: DISCONTINUED | OUTPATIENT
Start: 2018-09-24 | End: 2018-09-25 | Stop reason: HOSPADM

## 2018-09-24 RX ORDER — MAGNESIUM SULFATE IN WATER 40 MG/ML
2 INJECTION, SOLUTION INTRAVENOUS ONCE
Status: COMPLETED | OUTPATIENT
Start: 2018-09-24 | End: 2018-09-24

## 2018-09-24 RX ORDER — CLINDAMYCIN HYDROCHLORIDE 150 MG/1
300 CAPSULE ORAL EVERY 8 HOURS SCHEDULED
Status: DISCONTINUED | OUTPATIENT
Start: 2018-09-24 | End: 2018-09-25 | Stop reason: HOSPADM

## 2018-09-24 RX ADMIN — POTASSIUM CHLORIDE 20 MEQ: 1500 TABLET, EXTENDED RELEASE ORAL at 09:53

## 2018-09-24 RX ADMIN — POTASSIUM CHLORIDE 20 MEQ: 20 TABLET, EXTENDED RELEASE ORAL at 10:40

## 2018-09-24 RX ADMIN — SIMVASTATIN 40 MG: 40 TABLET, FILM COATED ORAL at 20:49

## 2018-09-24 RX ADMIN — FENOFIBRATE 160 MG: 160 TABLET ORAL at 09:53

## 2018-09-24 RX ADMIN — ENOXAPARIN SODIUM 40 MG: 40 INJECTION SUBCUTANEOUS at 09:52

## 2018-09-24 RX ADMIN — PANTOPRAZOLE SODIUM 40 MG: 40 TABLET, DELAYED RELEASE ORAL at 06:00

## 2018-09-24 RX ADMIN — POTASSIUM CHLORIDE 20 MEQ: 1500 TABLET, EXTENDED RELEASE ORAL at 20:49

## 2018-09-24 RX ADMIN — HYDROCODONE BITARTRATE AND ACETAMINOPHEN 1 TABLET: 5; 325 TABLET ORAL at 23:24

## 2018-09-24 RX ADMIN — Medication 10 ML: at 20:51

## 2018-09-24 RX ADMIN — BUPROPION HYDROCHLORIDE 150 MG: 300 TABLET, FILM COATED, EXTENDED RELEASE ORAL at 09:53

## 2018-09-24 RX ADMIN — MAGNESIUM SULFATE HEPTAHYDRATE 2 G: 40 INJECTION, SOLUTION INTRAVENOUS at 13:24

## 2018-09-24 RX ADMIN — VANCOMYCIN HYDROCHLORIDE 1750 MG: 10 INJECTION, POWDER, LYOPHILIZED, FOR SOLUTION INTRAVENOUS at 11:31

## 2018-09-24 RX ADMIN — DOXYLAMINE SUCCINATE 1 EACH: 25 TABLET ORAL at 13:12

## 2018-09-24 RX ADMIN — CEPHALEXIN 500 MG: 500 CAPSULE ORAL at 20:49

## 2018-09-24 RX ADMIN — IBUPROFEN 800 MG: 800 TABLET ORAL at 02:18

## 2018-09-24 RX ADMIN — CLINDAMYCIN HYDROCHLORIDE 300 MG: 150 CAPSULE ORAL at 20:49

## 2018-09-24 RX ADMIN — CYCLOBENZAPRINE HYDROCHLORIDE 10 MG: 10 TABLET, FILM COATED ORAL at 22:08

## 2018-09-24 RX ADMIN — HYDROCODONE BITARTRATE AND ACETAMINOPHEN 1 TABLET: 5; 325 TABLET ORAL at 17:02

## 2018-09-24 RX ADMIN — IBUPROFEN 800 MG: 800 TABLET ORAL at 09:16

## 2018-09-24 RX ADMIN — LOSARTAN POTASSIUM 100 MG: 100 TABLET ORAL at 09:53

## 2018-09-24 RX ADMIN — CEPHALEXIN 500 MG: 500 CAPSULE ORAL at 15:52

## 2018-09-24 RX ADMIN — CYCLOBENZAPRINE HYDROCHLORIDE 10 MG: 10 TABLET, FILM COATED ORAL at 13:24

## 2018-09-24 RX ADMIN — CLINDAMYCIN PHOSPHATE 600 MG: 600 INJECTION, SOLUTION INTRAVENOUS at 10:59

## 2018-09-24 RX ADMIN — CLINDAMYCIN HYDROCHLORIDE 300 MG: 150 CAPSULE ORAL at 17:00

## 2018-09-24 RX ADMIN — Medication 400 MG: at 10:41

## 2018-09-24 RX ADMIN — Medication 10 ML: at 11:00

## 2018-09-24 RX ADMIN — Medication 400 MG: at 20:50

## 2018-09-24 ASSESSMENT — PAIN SCALES - GENERAL
PAINLEVEL_OUTOF10: 0
PAINLEVEL_OUTOF10: 2
PAINLEVEL_OUTOF10: 5
PAINLEVEL_OUTOF10: 2
PAINLEVEL_OUTOF10: 3
PAINLEVEL_OUTOF10: 0
PAINLEVEL_OUTOF10: 6
PAINLEVEL_OUTOF10: 7
PAINLEVEL_OUTOF10: 0

## 2018-09-24 ASSESSMENT — PAIN DESCRIPTION - ONSET
ONSET: GRADUAL
ONSET: GRADUAL

## 2018-09-24 ASSESSMENT — PAIN DESCRIPTION - PAIN TYPE
TYPE: CHRONIC PAIN

## 2018-09-24 ASSESSMENT — PAIN DESCRIPTION - LOCATION
LOCATION: BACK

## 2018-09-24 ASSESSMENT — PAIN DESCRIPTION - PROGRESSION
CLINICAL_PROGRESSION: NOT CHANGED
CLINICAL_PROGRESSION: NOT CHANGED

## 2018-09-24 ASSESSMENT — PAIN DESCRIPTION - FREQUENCY
FREQUENCY: INTERMITTENT
FREQUENCY: INTERMITTENT

## 2018-09-24 ASSESSMENT — PAIN DESCRIPTION - DESCRIPTORS
DESCRIPTORS: ACHING;CONSTANT
DESCRIPTORS: ACHING;DISCOMFORT
DESCRIPTORS: ACHING;CONSTANT;DISCOMFORT

## 2018-09-24 ASSESSMENT — PAIN DESCRIPTION - ORIENTATION: ORIENTATION: LOWER

## 2018-09-24 NOTE — PROGRESS NOTES
Department of Orthopedic Surgery  Resident Progress Note    Patient seen and examined. Pain controlled. No new complaints. Denies chest pain, shortness of breath, dizziness/lightheadedness. States that his arm is much improved today and he has full ROM without pain . Denies fever or chills. Right arm IV infiltrated last night. VITALS:  /67   Pulse 94   Temp 99.2 °F (37.3 °C) (Oral)   Resp 18   Ht 6' 1\" (1.854 m)   Wt 250 lb (113.4 kg)   SpO2 97%   BMI 32.98 kg/m²     GENERAL: alert and awake  MUSCULOSKELETAL:   left upper extremity:  · Arm less erythematous  · No ttp over olecranon bursa  · No pain with PROM  · Edema has diminished to the point where his forearm is equal to about the contralateral side. · Compartments soft and compressible  · +AIN/PIN/Ulnar nerve function intact grossly  · + Radial pulse, Brisk capillary refill  · Sensation intact to touch in radial/ulnar/median nerve distributions to hand    CBC:   Lab Results   Component Value Date    WBC 9.4 09/23/2018    HGB 11.6 09/23/2018    HCT 32.6 09/23/2018     09/23/2018       ASSESSMENT  · Olecranon bursitis  · cellulitis     PLAN    · Continue  WBAT to LUE  · Continue abx- per infectious disease  · Continue compression  · Continue NSAIDS as able   · Use ice   · Pt/ot  · Continue to monitor  · Ortho to follow peripherally at this point.  Please contact with any questions or concerns   Agree with above  Patient was semaj nd examined

## 2018-09-24 NOTE — PROGRESS NOTES
[facility] Progress Note    Hospital Day: Hospital Day: 4  PT Name: Vita Fournier  MRN: [id number]  Primary Care Physician: Kary Alexandra DO  Requesting physician:   Estelita Hernandez DO    Reason for Admission:   Chief Complaint   Patient presents with    Cellulitis     pt had patch of dry skin on elbow that broke open and now arm is red swollen. Pt was seen at [facility]  and is on doxy and getting worse. Reason for consultation: L arm cellulitis  Chief Complaint: arm pain      Subjective  Brett Larson was seen and examined at bedside today. All patient questions were answered and all tests were reviewed. Wife present during my examination. Brett Larson states that his arm feels much improved. He is anxious for discharge. There are no adverse drug reactions noted including rash or itching. Otherwise he states that there are no new complaints at this time including no chest pain, shortness of breath, abdominal pain, nausea, vomiting, diarrhea, constipation, headaches, fevers, chills, lightheadedness, dizziness, calf pain.     Hospital Medications    magnesium sulfate 2 g in 50 mL IVPB premix Once   potassium chloride (KLOR-CON M) extended release tablet 20 mEq BID   magnesium oxide (MAG-OX) tablet 400 mg BID   cyclobenzaprine (FLEXERIL) tablet 10 mg TID PRN   HYDROcodone-acetaminophen (NORCO) 5-325 MG per tablet 1 tablet Q6H PRN   buPROPion (WELLBUTRIN XL) extended release tablet 150 mg QAM   fenofibrate tablet 160 mg Daily   ibuprofen (ADVIL;MOTRIN) tablet 800 mg Q6H PRN   pantoprazole (PROTONIX) tablet 40 mg QAM AC   simvastatin (ZOCOR) tablet 40 mg Nightly   sodium chloride flush 0.9 % injection 10 mL 2 times per day   sodium chloride flush 0.9 % injection 10 mL PRN   magnesium hydroxide (MILK OF MAGNESIA) 400 MG/5ML suspension 30 mL Daily PRN   ondansetron (ZOFRAN) injection 4 mg Q6H PRN   enoxaparin (LOVENOX) injection 40 mg Daily   acetaminophen (TYLENOL) tablet 650 mg Q4H PRN   clindamycin (CLEOCIN) 600 mg Patient had elbow scab that became infected Findings: Comparison: None. 2 views of the right elbow were obtained. No acute fracture or dislocation is seen. No radiopaque foreign body or abnormal soft tissue calcification is seen. No focal lytic or blastic osseous lesion or periosteal reaction is seen. No elbow joint effusion is identified     No acute fracture or dislocation or plain film radiographic evidence of osteomyelitis    Us Dup Upper Extremity Left Venous    Result Date: 9/21/2018  Location:200 Exam: US DUP UPPER EXTREMITY LEFT VENOUS Indications: Left arm swelling, pain, and redness Technique: Using real-time, color, and Doppler spectral waveform analysis, ultrasound examination of the  left jugular, the  left subclavian, the left axillary vein, and the deep veins of the left arm was performed. Findings: Comparison: None The left jugular vein is grossly patent and demonstrates flow within it. The  left axillary and subclavian veins are grossly patent, without an echogenic focus within them. There is evidence of color and Doppler flow within these vessels. The left axillary vein compresses fully. There is gross patency of the left common carotid artery. The  left cephalic, basilic and brachial veins are identified. These vessels are grossly patent, without an echogenic focus within them. These vessels compress fully and demonstrate color and Doppler flow within them. There is gross patency of the left antecubital vein. Left radial and left ulnar veins are grossly patent, demonstrate flow within them, compress fully, and do not demonstrate findings to suggest a thrombus. 1. No evidence of thrombus within left jugular, subclavian, or axillary veins.  2. No evidence of thrombus within deep veins of left arm       Microbiology   Blood culture   Lab Results   Component Value Date    BC 24 Hours- no growth 09/21/2018       Urine Culture, Routine   Date Value Ref Range Status   04/27/2018 Growth not present

## 2018-09-24 NOTE — DISCHARGE SUMMARY
Name:  Po Chen  :  1960  MRN:  [id number]  Room:  [facility]6/0336-02  DOS:  2018    [facility]  Internal Medicine  -Resident Discharge Summary-    PCP:  Zachary Meyers DO  Admitting Physician:  Henrique Landeros DO  Consultant(s):   PHARMACY TO DOSE VANCOMYCIN  IP CONSULT TO INFECTIOUS DISEASES      Admission Date:  2018   Discharge Date:  2018      Admission Diagnoses     Cellulitis [L03.90]     Discharge Diagnoses  Severe LUE Cellulitis and edema  Severe hypokalemiaetiology unknown  Severe hypomagnesemiaetiology unknown(possible long-term PPI use)  HTN  HLD  Olecranon bursitis  Medical noncompliance       Brief History of Present Illness and Hospital Course  Po Chen is a 60 y.o. male patient of Zachary Meyers DO who  has a past medical history of Acid reflux; GERD (gastroesophageal reflux disease); Hyperlipidemia; Hypertension; Hypokalemia; and Hypomagnesemia. who originally had concerns including Cellulitis (pt had patch of dry skin on elbow that broke open and now arm is red swollen. Pt was seen at family  and is on doxy and getting worse. ). at presentation on 2018, and was found to have Cellulitis [L03.90] after workup. ID and Orthopaedics were consulted. The pt was transitioned from IV to PO antibiotics. His erythema and edema improved. He required oral and IV Magnesium and Potassium replacement. He denied any cardiac symptoms. The patient will have lab asked for repeat BMP with magnesium in 1 week. Patient may need further workup for a recurrent low magnesium and potassium. Patient is to follow-up with the family physician in one week to review lab work. He was given work excuse to return to work next Monday. He was also given a prescription for Flexeril for his chronic low back pain which worsened with spasm while in hospitalization.     Brief Physical Examination and Laboratory Data on Day of Discharge   Vitals:  /80 medication, and follow the directions you see here. ZETIA 10 MG tablet  Generic drug:  ezetimibe  What changed:  Another medication with the same name was removed. Continue taking this medication, and follow the directions you see here. CONTINUE taking these medications    buPROPion 150 MG extended release tablet  Commonly known as:  WELLBUTRIN XL     doxycycline hyclate 100 MG capsule  Commonly known as:  VIBRAMYCIN     furosemide 20 MG tablet  Commonly known as:  LASIX     ibuprofen 800 MG tablet  Commonly known as:  ADVIL;MOTRIN  Take 1 tablet by mouth every 6 hours as needed for Pain for up to 90 doses. STOP taking these medications    GLUCOSAMINE 1500 COMPLEX Caps     omeprazole 40 MG delayed release capsule  Commonly known as:  PRILOSEC     TRILIPIX 135 MG Cpdr delayed release capsule  Generic drug:  choline fenofibrate           Where to Get Your Medications      You can get these medications from any pharmacy    Bring a paper prescription for each of these medications  · cephALEXin 500 MG capsule  · clindamycin 300 MG capsule  · cyclobenzaprine 10 MG tablet         Follow-up / Instructions  · This patient is instructed to follow-up with his primary care physician. · Patient is instructed to follow-up with the consults listed above as directed by them. · he is instructed to resume home medications and take new medications as indicated in the list above. · If the patient has a recurrence of symptoms, he is instructed to go to the ED. Preparing for this patient's discharge, including paperwork, orders, instructions, and meeting with patient required > 30 minutes.     Patience Elliott DO 11:14 AM 9/25/2018

## 2018-09-25 VITALS
SYSTOLIC BLOOD PRESSURE: 123 MMHG | HEIGHT: 73 IN | RESPIRATION RATE: 18 BRPM | DIASTOLIC BLOOD PRESSURE: 80 MMHG | HEART RATE: 94 BPM | TEMPERATURE: 97.8 F | BODY MASS INDEX: 33.13 KG/M2 | OXYGEN SATURATION: 98 % | WEIGHT: 250 LBS

## 2018-09-25 LAB
ANION GAP SERPL CALCULATED.3IONS-SCNC: 9 MMOL/L (ref 7–16)
BUN BLDV-MCNC: 12 MG/DL (ref 6–20)
CALCIUM IONIZED: 1.35 MMOL/L (ref 1.15–1.33)
CALCIUM SERPL-MCNC: 9.1 MG/DL (ref 8.6–10.2)
CHLORIDE BLD-SCNC: 101 MMOL/L (ref 98–107)
CO2: 26 MMOL/L (ref 22–29)
CREAT SERPL-MCNC: 0.9 MG/DL (ref 0.7–1.2)
EKG ATRIAL RATE: 93 BPM
EKG P AXIS: 8 DEGREES
EKG P-R INTERVAL: 170 MS
EKG Q-T INTERVAL: 334 MS
EKG QRS DURATION: 76 MS
EKG QTC CALCULATION (BAZETT): 415 MS
EKG R AXIS: 30 DEGREES
EKG T AXIS: 25 DEGREES
EKG VENTRICULAR RATE: 93 BPM
GFR AFRICAN AMERICAN: >60
GFR NON-AFRICAN AMERICAN: >60 ML/MIN/1.73
GLUCOSE BLD-MCNC: 153 MG/DL (ref 74–109)
MAGNESIUM: 1.7 MG/DL (ref 1.6–2.6)
POTASSIUM SERPL-SCNC: 4.3 MMOL/L (ref 3.5–5)
SODIUM BLD-SCNC: 136 MMOL/L (ref 132–146)

## 2018-09-25 PROCEDURE — 83735 ASSAY OF MAGNESIUM: CPT

## 2018-09-25 PROCEDURE — 6370000000 HC RX 637 (ALT 250 FOR IP): Performed by: FAMILY MEDICINE

## 2018-09-25 PROCEDURE — 36415 COLL VENOUS BLD VENIPUNCTURE: CPT

## 2018-09-25 PROCEDURE — 82330 ASSAY OF CALCIUM: CPT

## 2018-09-25 PROCEDURE — 82652 VIT D 1 25-DIHYDROXY: CPT

## 2018-09-25 PROCEDURE — 6370000000 HC RX 637 (ALT 250 FOR IP): Performed by: INTERNAL MEDICINE

## 2018-09-25 PROCEDURE — 6370000000 HC RX 637 (ALT 250 FOR IP): Performed by: SPECIALIST

## 2018-09-25 PROCEDURE — 93005 ELECTROCARDIOGRAM TRACING: CPT | Performed by: FAMILY MEDICINE

## 2018-09-25 PROCEDURE — 80048 BASIC METABOLIC PNL TOTAL CA: CPT

## 2018-09-25 PROCEDURE — 2580000003 HC RX 258: Performed by: INTERNAL MEDICINE

## 2018-09-25 RX ORDER — CYCLOBENZAPRINE HCL 10 MG
10 TABLET ORAL 3 TIMES DAILY PRN
Qty: 30 TABLET | Refills: 0 | Status: SHIPPED | OUTPATIENT
Start: 2018-09-25 | End: 2018-10-05

## 2018-09-25 RX ORDER — LOSARTAN POTASSIUM 100 MG/1
100 TABLET ORAL DAILY
Qty: 30 TABLET | Refills: 3 | Status: SHIPPED | OUTPATIENT
Start: 2018-09-26

## 2018-09-25 RX ORDER — CEPHALEXIN 500 MG/1
500 CAPSULE ORAL 4 TIMES DAILY
Qty: 40 CAPSULE | Refills: 0 | Status: SHIPPED | OUTPATIENT
Start: 2018-09-25 | End: 2019-12-13

## 2018-09-25 RX ORDER — CLINDAMYCIN HYDROCHLORIDE 300 MG/1
300 CAPSULE ORAL EVERY 8 HOURS SCHEDULED
Qty: 30 CAPSULE | Refills: 0 | Status: SHIPPED | OUTPATIENT
Start: 2018-09-25 | End: 2018-10-05

## 2018-09-25 RX ADMIN — CYCLOBENZAPRINE HYDROCHLORIDE 10 MG: 10 TABLET, FILM COATED ORAL at 09:33

## 2018-09-25 RX ADMIN — Medication 400 MG: at 09:29

## 2018-09-25 RX ADMIN — CEPHALEXIN 500 MG: 500 CAPSULE ORAL at 05:30

## 2018-09-25 RX ADMIN — POTASSIUM CHLORIDE 20 MEQ: 1500 TABLET, EXTENDED RELEASE ORAL at 09:37

## 2018-09-25 RX ADMIN — LOSARTAN POTASSIUM 100 MG: 100 TABLET ORAL at 09:28

## 2018-09-25 RX ADMIN — FENOFIBRATE 160 MG: 160 TABLET ORAL at 09:28

## 2018-09-25 RX ADMIN — Medication 10 ML: at 09:29

## 2018-09-25 RX ADMIN — CLINDAMYCIN HYDROCHLORIDE 300 MG: 150 CAPSULE ORAL at 05:30

## 2018-09-25 RX ADMIN — BUPROPION HYDROCHLORIDE 150 MG: 300 TABLET, FILM COATED, EXTENDED RELEASE ORAL at 09:28

## 2018-09-25 RX ADMIN — HYDROCODONE BITARTRATE AND ACETAMINOPHEN 1 TABLET: 5; 325 TABLET ORAL at 09:29

## 2018-09-25 ASSESSMENT — PAIN DESCRIPTION - ORIENTATION: ORIENTATION: LOWER

## 2018-09-25 ASSESSMENT — PAIN DESCRIPTION - PAIN TYPE: TYPE: CHRONIC PAIN

## 2018-09-25 ASSESSMENT — PAIN DESCRIPTION - ONSET: ONSET: GRADUAL

## 2018-09-25 ASSESSMENT — PAIN DESCRIPTION - PROGRESSION: CLINICAL_PROGRESSION: NOT CHANGED

## 2018-09-25 ASSESSMENT — PAIN DESCRIPTION - LOCATION: LOCATION: BACK

## 2018-09-25 ASSESSMENT — PAIN DESCRIPTION - FREQUENCY: FREQUENCY: INTERMITTENT

## 2018-09-25 ASSESSMENT — PAIN SCALES - GENERAL
PAINLEVEL_OUTOF10: 2
PAINLEVEL_OUTOF10: 6
PAINLEVEL_OUTOF10: 6

## 2018-09-25 ASSESSMENT — PAIN DESCRIPTION - DESCRIPTORS: DESCRIPTORS: ACHING;CONSTANT

## 2018-09-25 NOTE — PROGRESS NOTES
effusion is identified     No acute fracture or dislocation or plain film radiographic evidence of osteomyelitis    Us Dup Upper Extremity Left Venous    Result Date: 9/21/2018  Location:200 Exam: US DUP UPPER EXTREMITY LEFT VENOUS Indications: Left arm swelling, pain, and redness Technique: Using real-time, color, and Doppler spectral waveform analysis, ultrasound examination of the  left jugular, the  left subclavian, the left axillary vein, and the deep veins of the left arm was performed. Findings: Comparison: None The left jugular vein is grossly patent and demonstrates flow within it. The  left axillary and subclavian veins are grossly patent, without an echogenic focus within them. There is evidence of color and Doppler flow within these vessels. The left axillary vein compresses fully. There is gross patency of the left common carotid artery. The  left cephalic, basilic and brachial veins are identified. These vessels are grossly patent, without an echogenic focus within them. These vessels compress fully and demonstrate color and Doppler flow within them. There is gross patency of the left antecubital vein. Left radial and left ulnar veins are grossly patent, demonstrate flow within them, compress fully, and do not demonstrate findings to suggest a thrombus. 1. No evidence of thrombus within left jugular, subclavian, or axillary veins. 2. No evidence of thrombus within deep veins of left arm       Microbiology   Blood culture   Lab Results   Component Value Date    BC 24 Hours- no growth 09/21/2018       Urine Culture, Routine   Date Value Ref Range Status   04/27/2018 Growth not present  Final     No results found for: RESPCULTURE  *    No results found for: ACIDFASTSMEAR, AFBCX    Assessment  Deborah Roman is a 60y.o. year old male who presented on 9/21/2018 and is being treated for <principal problem not specified> .      Patient Active Problem List    Diagnosis Date Noted    Left arm

## 2018-09-26 LAB — VITAMIN D 1,25-DIHYDROXY: 41.6 PG/ML (ref 19.9–79.3)

## 2018-09-27 LAB
BLOOD CULTURE, ROUTINE: NORMAL
CULTURE, BLOOD 2: NORMAL

## 2018-10-05 ENCOUNTER — HOSPITAL ENCOUNTER (OUTPATIENT)
Age: 58
Discharge: HOME | End: 2018-10-07
Payer: COMMERCIAL

## 2018-10-05 LAB
ALBUMIN SERPL-MCNC: 3.9 G/DL (ref 3.5–5.2)
ALP BLD-CCNC: 36 U/L (ref 40–129)
ALT SERPL-CCNC: 10 U/L (ref 0–40)
ANION GAP SERPL CALCULATED.3IONS-SCNC: 20 MMOL/L (ref 7–16)
AST SERPL-CCNC: 15 U/L (ref 0–39)
BILIRUB SERPL-MCNC: 0.3 MG/DL (ref 0–1.2)
BUN BLDV-MCNC: 15 MG/DL (ref 6–20)
CALCIUM SERPL-MCNC: 10.2 MG/DL (ref 8.6–10.2)
CHLORIDE BLD-SCNC: 93 MMOL/L (ref 98–107)
CO2: 23 MMOL/L (ref 22–29)
CREAT SERPL-MCNC: 1 MG/DL (ref 0.7–1.2)
GFR AFRICAN AMERICAN: >60
GFR NON-AFRICAN AMERICAN: >60 ML/MIN/1.73
GLUCOSE BLD-MCNC: 105 MG/DL (ref 74–109)
HCT VFR BLD CALC: 37.5 % (ref 37–54)
HEMOGLOBIN: 12.3 G/DL (ref 12.5–16.5)
MCH RBC QN AUTO: 32.6 PG (ref 26–35)
MCHC RBC AUTO-ENTMCNC: 32.8 % (ref 32–34.5)
MCV RBC AUTO: 99.5 FL (ref 80–99.9)
PDW BLD-RTO: 11.9 FL (ref 11.5–15)
PLATELET # BLD: 831 E9/L (ref 130–450)
PMV BLD AUTO: 8.8 FL (ref 7–12)
POTASSIUM SERPL-SCNC: 4 MMOL/L (ref 3.5–5)
RBC # BLD: 3.77 E12/L (ref 3.8–5.8)
SODIUM BLD-SCNC: 136 MMOL/L (ref 132–146)
TOTAL PROTEIN: 7.6 G/DL (ref 6.4–8.3)
WBC # BLD: 11.8 E9/L (ref 4.5–11.5)

## 2018-10-05 PROCEDURE — 85027 COMPLETE CBC AUTOMATED: CPT

## 2018-10-05 PROCEDURE — 80053 COMPREHEN METABOLIC PANEL: CPT

## 2018-11-01 NOTE — DISCHARGE SUMMARY
Pulse 94   Temp 97.8 °F (36.6 °C) (Oral)   Resp 18   Ht 6' 1\" (1.854 m)   Wt 250 lb (113.4 kg)   SpO2 98%   BMI 32.98 kg/m²   General Appearance:  awake, alert, and oriented to person, place, time, and purpose; appears stated age and cooperative; no apparent distress no labored breathing  HEENT:  NCAT; PERRL; conjunctiva pink, sclera clear  Neck:  no adenopathy, bruit, JVD, tenderness, masses, or nodules; supple, symmetrical, trachea midline, thyroid not enlarged  Lung:  clear to auscultation bilaterally; no use of accessory muscles; no rhonchi, rales, or crackles  Heart:  regular rate and regular rhythm without murmur, rub, or gallop  Abdomen:  soft, nontender, nondistended; normoactive bowel sounds; no organomegaly  Extremities:  extremities normal, atraumatic, no cyanosis or edema  Musculokeletal:  no joint swelling, no muscle tenderness. ROM normal in all joints of extremities.    Neurologic:  mental status A&Ox3, thought content appropriate; CN II-XII grossly intact; sensation intact, motor strength 5/5 globally; no slurred speech    Labs  Lab Results   Component Value Date    WBC 11.8 (H) 10/05/2018    HGB 12.3 (L) 10/05/2018    HCT 37.5 10/05/2018     (H) 10/05/2018     10/05/2018    K 4.0 10/05/2018    CL 93 (L) 10/05/2018    CREATININE 1.0 10/05/2018    BUN 15 10/05/2018    CO2 23 10/05/2018    GLUCOSE 105 10/05/2018    ALT 10 10/05/2018    AST 15 10/05/2018    INR 1.1 04/27/2018     Lab Results   Component Value Date    INR 1.1 04/27/2018    INR 1.0 01/13/2014    PROTIME 12.3 04/27/2018    PROTIME 11.9  01/13/2014      Lab Results   Component Value Date    TSH 1.880 09/22/2018     Lab Results   Component Value Date    TRIG 183 (H) 09/22/2018    TRIG 152 (H) 07/28/2017    TRIG 184 (H) 09/23/2016     Lab Results   Component Value Date    HDL 32 09/22/2018    HDL 45 07/28/2017    HDL 47 09/23/2016     Lab Results   Component Value Date    LDLCALC 50 09/22/2018    LDLCALC 68 07/28/2017 [facility] 71 09/23/2016     Lab Results   Component Value Date    LABA1C 5.2 09/22/2018       Pertinent Imaging  Xr Elbow Left (2 Views)    Result Date: 9/21/2018  Location:200 Exam: XR ELBOW LEFT (2 VIEWS) History:  Cellulitis. Elbow pain and swelling. Patient had elbow scab that became infected Findings: Comparison: None. 2 views of the right elbow were obtained. No acute fracture or dislocation is seen. No radiopaque foreign body or abnormal soft tissue calcification is seen. No focal lytic or blastic osseous lesion or periosteal reaction is seen. No elbow joint effusion is identified     No acute fracture or dislocation or plain film radiographic evidence of osteomyelitis    Us Dup Upper Extremity Left Venous    Result Date: 9/21/2018  Location:200 Exam: US DUP UPPER EXTREMITY LEFT VENOUS Indications: Left arm swelling, pain, and redness Technique: Using real-time, color, and Doppler spectral waveform analysis, ultrasound examination of the  left jugular, the  left subclavian, the left axillary vein, and the deep veins of the left arm was performed. Findings: Comparison: None The left jugular vein is grossly patent and demonstrates flow within it. The  left axillary and subclavian veins are grossly patent, without an echogenic focus within them. There is evidence of color and Doppler flow within these vessels. The left axillary vein compresses fully. There is gross patency of the left common carotid artery. The  left cephalic, basilic and brachial veins are identified. These vessels are grossly patent, without an echogenic focus within them. These vessels compress fully and demonstrate color and Doppler flow within them. There is gross patency of the left antecubital vein. Left radial and left ulnar veins are grossly patent, demonstrate flow within them, compress fully, and do not demonstrate findings to suggest a thrombus. 1. No evidence of thrombus within left jugular, subclavian, or axillary veins.  2. No

## 2019-01-23 ENCOUNTER — HOSPITAL ENCOUNTER (OUTPATIENT)
Age: 59
Discharge: HOME | End: 2019-01-25
Payer: COMMERCIAL

## 2019-01-23 LAB
ALBUMIN SERPL-MCNC: 4.5 G/DL (ref 3.5–5.2)
ALP BLD-CCNC: 32 U/L (ref 40–129)
ALT SERPL-CCNC: 20 U/L (ref 0–40)
ANION GAP SERPL CALCULATED.3IONS-SCNC: 16 MMOL/L (ref 7–16)
AST SERPL-CCNC: 22 U/L (ref 0–39)
BILIRUB SERPL-MCNC: 0.3 MG/DL (ref 0–1.2)
BUN BLDV-MCNC: 27 MG/DL (ref 8–23)
CALCIUM SERPL-MCNC: 10.2 MG/DL (ref 8.6–10.2)
CHLORIDE BLD-SCNC: 96 MMOL/L (ref 98–107)
CHOLESTEROL, TOTAL: 147 MG/DL (ref 0–199)
CO2: 27 MMOL/L (ref 22–29)
CREAT SERPL-MCNC: 0.9 MG/DL (ref 0.7–1.2)
GFR AFRICAN AMERICAN: >60
GFR NON-AFRICAN AMERICAN: >60 ML/MIN/1.73
GLUCOSE BLD-MCNC: 102 MG/DL (ref 74–99)
HCT VFR BLD CALC: 42.5 % (ref 37–54)
HDLC SERPL-MCNC: 41 MG/DL
HEMOGLOBIN: 14.7 G/DL (ref 12.5–16.5)
LDL CHOLESTEROL CALCULATED: 69 MG/DL (ref 0–99)
MCH RBC QN AUTO: 32.2 PG (ref 26–35)
MCHC RBC AUTO-ENTMCNC: 34.6 % (ref 32–34.5)
MCV RBC AUTO: 93 FL (ref 80–99.9)
PDW BLD-RTO: 12.2 FL (ref 11.5–15)
PLATELET # BLD: 416 E9/L (ref 130–450)
PMV BLD AUTO: 9.9 FL (ref 7–12)
POTASSIUM SERPL-SCNC: 3.9 MMOL/L (ref 3.5–5)
RBC # BLD: 4.57 E12/L (ref 3.8–5.8)
SODIUM BLD-SCNC: 139 MMOL/L (ref 132–146)
TOTAL PROTEIN: 7.2 G/DL (ref 6.4–8.3)
TRIGL SERPL-MCNC: 186 MG/DL (ref 0–149)
TSH SERPL DL<=0.05 MIU/L-ACNC: 1.93 UIU/ML (ref 0.27–4.2)
VLDLC SERPL CALC-MCNC: 37 MG/DL
WBC # BLD: 7.1 E9/L (ref 4.5–11.5)

## 2019-01-23 PROCEDURE — 80053 COMPREHEN METABOLIC PANEL: CPT

## 2019-01-23 PROCEDURE — 85027 COMPLETE CBC AUTOMATED: CPT

## 2019-01-23 PROCEDURE — 80061 LIPID PANEL: CPT

## 2019-01-23 PROCEDURE — 84443 ASSAY THYROID STIM HORMONE: CPT

## 2019-01-23 PROCEDURE — G0103 PSA SCREENING: HCPCS

## 2019-01-24 LAB — PROSTATE SPECIFIC ANTIGEN: 0.73 NG/ML (ref 0–4)

## 2019-09-12 ENCOUNTER — INITIAL CONSULT (OUTPATIENT)
Dept: NEUROSURGERY | Age: 59
End: 2019-09-12
Payer: COMMERCIAL

## 2019-09-12 ENCOUNTER — HOSPITAL ENCOUNTER (OUTPATIENT)
Age: 59
Discharge: HOME | End: 2019-09-14
Payer: COMMERCIAL

## 2019-09-12 ENCOUNTER — HOSPITAL ENCOUNTER (OUTPATIENT)
Dept: GENERAL RADIOLOGY | Age: 59
Discharge: HOME | End: 2019-09-14
Payer: COMMERCIAL

## 2019-09-12 VITALS
HEART RATE: 93 BPM | WEIGHT: 251 LBS | DIASTOLIC BLOOD PRESSURE: 87 MMHG | BODY MASS INDEX: 33.27 KG/M2 | HEIGHT: 73 IN | SYSTOLIC BLOOD PRESSURE: 138 MMHG

## 2019-09-12 DIAGNOSIS — M54.16 LUMBAR RADICULOPATHY: Primary | ICD-10-CM

## 2019-09-12 DIAGNOSIS — M51.26 LUMBAR DISC HERNIATION: Primary | ICD-10-CM

## 2019-09-12 DIAGNOSIS — M54.16 LUMBAR RADICULOPATHY: ICD-10-CM

## 2019-09-12 PROCEDURE — G8427 DOCREV CUR MEDS BY ELIG CLIN: HCPCS | Performed by: PHYSICIAN ASSISTANT

## 2019-09-12 PROCEDURE — 72120 X-RAY BEND ONLY L-S SPINE: CPT

## 2019-09-12 PROCEDURE — 1036F TOBACCO NON-USER: CPT | Performed by: PHYSICIAN ASSISTANT

## 2019-09-12 PROCEDURE — 72100 X-RAY EXAM L-S SPINE 2/3 VWS: CPT

## 2019-09-12 PROCEDURE — 3017F COLORECTAL CA SCREEN DOC REV: CPT | Performed by: PHYSICIAN ASSISTANT

## 2019-09-12 PROCEDURE — 99203 OFFICE O/P NEW LOW 30 MIN: CPT | Performed by: PHYSICIAN ASSISTANT

## 2019-09-12 PROCEDURE — G8417 CALC BMI ABV UP PARAM F/U: HCPCS | Performed by: PHYSICIAN ASSISTANT

## 2019-09-12 ASSESSMENT — ENCOUNTER SYMPTOMS
BACK PAIN: 1
GASTROINTESTINAL NEGATIVE: 1
EYES NEGATIVE: 1
ALLERGIC/IMMUNOLOGIC NEGATIVE: 1
RESPIRATORY NEGATIVE: 1

## 2019-09-12 NOTE — PROGRESS NOTES
Subjective:      Patient ID: Lala Reagan is a 60 y.o. male. Back Pain   This is a chronic problem. Episode onset: 5 + years. The problem occurs daily. The problem has been gradually worsening since onset. The pain is present in the lumbar spine (and right leg pain to the ankle). The quality of the pain is described as aching. The pain is severe. The symptoms are aggravated by twisting, standing and bending. Associated symptoms include leg pain. Treatments tried: lumbar discectomy 2018, ORION, PT, motrin, gabapentin. The treatment provided mild relief. Leg Pain          Review of Systems   Constitutional: Negative. HENT: Negative. Eyes: Negative. Respiratory: Negative. Cardiovascular: Negative. Gastrointestinal: Negative. Endocrine: Negative. Genitourinary: Negative. Musculoskeletal: Positive for back pain. Skin: Negative. Allergic/Immunologic: Negative. Neurological: Negative. Hematological: Negative. Psychiatric/Behavioral: Negative. Objective:   Physical Exam   Constitutional: He is oriented to person, place, and time. He appears well-developed and well-nourished. HENT:   Head: Normocephalic and atraumatic. Eyes: Pupils are equal, round, and reactive to light. EOM are normal.   Neck: Normal range of motion. Neck supple. Pulmonary/Chest: No respiratory distress. Abdominal: He exhibits no distension. Musculoskeletal:   Pain with ROM of the lumbar spine   Neurological: He is alert and oriented to person, place, and time. He has normal strength. He is not disoriented. No cranial nerve deficit or sensory deficit. Gait normal. GCS eye subscore is 4. GCS verbal subscore is 5. GCS motor subscore is 6. Reflex Scores:       Patellar reflexes are 2+ on the right side and 2+ on the left side. Achilles reflexes are 2+ on the right side and 2+ on the left side. Skin: Skin is warm and dry. Psychiatric: He has a normal mood and affect.        Assessment:

## 2019-10-14 ENCOUNTER — TELEPHONE (OUTPATIENT)
Dept: NEUROSURGERY | Age: 59
End: 2019-10-14

## 2019-10-15 DIAGNOSIS — M54.16 LUMBAR RADICULOPATHY: Primary | ICD-10-CM

## 2019-11-04 DIAGNOSIS — M54.16 LUMBAR RADICULOPATHY: ICD-10-CM

## 2019-11-19 ENCOUNTER — OFFICE VISIT (OUTPATIENT)
Dept: NEUROSURGERY | Age: 59
End: 2019-11-19
Payer: COMMERCIAL

## 2019-11-19 VITALS
WEIGHT: 256 LBS | HEIGHT: 73 IN | BODY MASS INDEX: 33.93 KG/M2 | DIASTOLIC BLOOD PRESSURE: 102 MMHG | SYSTOLIC BLOOD PRESSURE: 156 MMHG | HEART RATE: 86 BPM

## 2019-11-19 DIAGNOSIS — M54.16 LUMBAR RADICULOPATHY: Primary | ICD-10-CM

## 2019-11-19 DIAGNOSIS — M47.816 LUMBAR FACET ARTHROPATHY: Primary | ICD-10-CM

## 2019-11-19 PROCEDURE — 3017F COLORECTAL CA SCREEN DOC REV: CPT | Performed by: PHYSICIAN ASSISTANT

## 2019-11-19 PROCEDURE — G8427 DOCREV CUR MEDS BY ELIG CLIN: HCPCS | Performed by: PHYSICIAN ASSISTANT

## 2019-11-19 PROCEDURE — 99213 OFFICE O/P EST LOW 20 MIN: CPT | Performed by: PHYSICIAN ASSISTANT

## 2019-11-19 PROCEDURE — G8417 CALC BMI ABV UP PARAM F/U: HCPCS | Performed by: PHYSICIAN ASSISTANT

## 2019-11-19 PROCEDURE — 1036F TOBACCO NON-USER: CPT | Performed by: PHYSICIAN ASSISTANT

## 2019-11-19 PROCEDURE — G8484 FLU IMMUNIZE NO ADMIN: HCPCS | Performed by: PHYSICIAN ASSISTANT

## 2019-11-19 RX ORDER — PREGABALIN 75 MG/1
75 CAPSULE ORAL 2 TIMES DAILY
Qty: 60 CAPSULE | Refills: 2 | Status: SHIPPED
Start: 2019-11-19 | End: 2020-02-14

## 2019-11-19 ASSESSMENT — ENCOUNTER SYMPTOMS
RESPIRATORY NEGATIVE: 1
EYES NEGATIVE: 1
ALLERGIC/IMMUNOLOGIC NEGATIVE: 1
GASTROINTESTINAL NEGATIVE: 1
BACK PAIN: 1

## 2019-12-04 ENCOUNTER — HOSPITAL ENCOUNTER (OUTPATIENT)
Age: 59
Discharge: HOME | End: 2019-12-06
Payer: COMMERCIAL

## 2019-12-04 LAB
ALBUMIN SERPL-MCNC: 4.4 G/DL (ref 3.5–5.2)
ALP BLD-CCNC: 40 U/L (ref 40–129)
ALT SERPL-CCNC: 25 U/L (ref 0–40)
ANION GAP SERPL CALCULATED.3IONS-SCNC: 14 MMOL/L (ref 7–16)
AST SERPL-CCNC: 26 U/L (ref 0–39)
BILIRUB SERPL-MCNC: 0.5 MG/DL (ref 0–1.2)
BUN BLDV-MCNC: 15 MG/DL (ref 8–23)
CALCIUM SERPL-MCNC: 9.9 MG/DL (ref 8.6–10.2)
CHLORIDE BLD-SCNC: 93 MMOL/L (ref 98–107)
CHOLESTEROL, TOTAL: 162 MG/DL (ref 0–199)
CO2: 28 MMOL/L (ref 22–29)
CREAT SERPL-MCNC: 0.8 MG/DL (ref 0.7–1.2)
GFR AFRICAN AMERICAN: >60
GFR NON-AFRICAN AMERICAN: >60 ML/MIN/1.73
GLUCOSE BLD-MCNC: 101 MG/DL (ref 74–99)
HDLC SERPL-MCNC: 48 MG/DL
LDL CHOLESTEROL CALCULATED: 78 MG/DL (ref 0–99)
POTASSIUM SERPL-SCNC: 3.6 MMOL/L (ref 3.5–5)
PROSTATE SPECIFIC ANTIGEN: 0.66 NG/ML (ref 0–4)
SODIUM BLD-SCNC: 135 MMOL/L (ref 132–146)
TOTAL PROTEIN: 7.1 G/DL (ref 6.4–8.3)
TRIGL SERPL-MCNC: 182 MG/DL (ref 0–149)
VLDLC SERPL CALC-MCNC: 36 MG/DL

## 2019-12-04 PROCEDURE — 80061 LIPID PANEL: CPT

## 2019-12-04 PROCEDURE — G0103 PSA SCREENING: HCPCS

## 2019-12-04 PROCEDURE — 80053 COMPREHEN METABOLIC PANEL: CPT

## 2019-12-13 ENCOUNTER — PREP FOR PROCEDURE (OUTPATIENT)
Dept: PAIN MANAGEMENT | Age: 59
End: 2019-12-13

## 2019-12-13 ENCOUNTER — OFFICE VISIT (OUTPATIENT)
Dept: PAIN MANAGEMENT | Age: 59
End: 2019-12-13
Payer: COMMERCIAL

## 2019-12-13 VITALS
DIASTOLIC BLOOD PRESSURE: 62 MMHG | HEART RATE: 88 BPM | BODY MASS INDEX: 33.8 KG/M2 | TEMPERATURE: 98.4 F | SYSTOLIC BLOOD PRESSURE: 112 MMHG | HEIGHT: 73 IN | WEIGHT: 255 LBS | RESPIRATION RATE: 16 BRPM | OXYGEN SATURATION: 98 %

## 2019-12-13 DIAGNOSIS — G89.4 CHRONIC PAIN SYNDROME: ICD-10-CM

## 2019-12-13 DIAGNOSIS — M47.816 LUMBAR FACET ARTHROPATHY: Primary | ICD-10-CM

## 2019-12-13 DIAGNOSIS — M51.9 LUMBAR DISC DISORDER: ICD-10-CM

## 2019-12-13 DIAGNOSIS — M47.816 LUMBAR SPONDYLOSIS: ICD-10-CM

## 2019-12-13 DIAGNOSIS — M96.1 LUMBAR POSTLAMINECTOMY SYNDROME: ICD-10-CM

## 2019-12-13 DIAGNOSIS — M54.16 LUMBAR RADICULOPATHY: ICD-10-CM

## 2019-12-13 PROCEDURE — 99204 OFFICE O/P NEW MOD 45 MIN: CPT | Performed by: PAIN MEDICINE

## 2019-12-13 PROCEDURE — 1036F TOBACCO NON-USER: CPT | Performed by: PAIN MEDICINE

## 2019-12-13 PROCEDURE — G8427 DOCREV CUR MEDS BY ELIG CLIN: HCPCS | Performed by: PAIN MEDICINE

## 2019-12-13 PROCEDURE — G8484 FLU IMMUNIZE NO ADMIN: HCPCS | Performed by: PAIN MEDICINE

## 2019-12-13 PROCEDURE — 3017F COLORECTAL CA SCREEN DOC REV: CPT | Performed by: PAIN MEDICINE

## 2019-12-13 PROCEDURE — G8417 CALC BMI ABV UP PARAM F/U: HCPCS | Performed by: PAIN MEDICINE

## 2019-12-13 RX ORDER — HYDROCHLOROTHIAZIDE 25 MG/1
TABLET ORAL
Refills: 0 | COMMUNITY
Start: 2019-12-05 | End: 2020-11-11 | Stop reason: ALTCHOICE

## 2019-12-13 RX ORDER — AMLODIPINE BESYLATE 5 MG/1
TABLET ORAL
Refills: 1 | COMMUNITY
Start: 2019-11-19 | End: 2020-11-11 | Stop reason: ALTCHOICE

## 2020-01-14 ENCOUNTER — HOSPITAL ENCOUNTER (OUTPATIENT)
Age: 60
Setting detail: OUTPATIENT SURGERY
Discharge: HOME | End: 2020-01-14
Attending: PAIN MEDICINE | Admitting: PAIN MEDICINE
Payer: COMMERCIAL

## 2020-01-14 ENCOUNTER — HOSPITAL ENCOUNTER (OUTPATIENT)
Dept: OPERATING ROOM | Age: 60
Setting detail: OUTPATIENT SURGERY
Discharge: HOME | End: 2020-01-14
Attending: PAIN MEDICINE
Payer: COMMERCIAL

## 2020-01-14 VITALS
RESPIRATION RATE: 16 BRPM | HEART RATE: 88 BPM | SYSTOLIC BLOOD PRESSURE: 138 MMHG | DIASTOLIC BLOOD PRESSURE: 87 MMHG | OXYGEN SATURATION: 94 %

## 2020-01-14 PROCEDURE — 3600000005 HC SURGERY LEVEL 5 BASE: Performed by: PAIN MEDICINE

## 2020-01-14 PROCEDURE — 2500000003 HC RX 250 WO HCPCS: Performed by: PAIN MEDICINE

## 2020-01-14 PROCEDURE — 64495 INJ PARAVERT F JNT L/S 3 LEV: CPT | Performed by: PAIN MEDICINE

## 2020-01-14 PROCEDURE — 64494 INJ PARAVERT F JNT L/S 2 LEV: CPT | Performed by: PAIN MEDICINE

## 2020-01-14 PROCEDURE — 7100000011 HC PHASE II RECOVERY - ADDTL 15 MIN: Performed by: PAIN MEDICINE

## 2020-01-14 PROCEDURE — 64493 INJ PARAVERT F JNT L/S 1 LEV: CPT | Performed by: PAIN MEDICINE

## 2020-01-14 PROCEDURE — 3209999900 FLUORO FOR SURGICAL PROCEDURES

## 2020-01-14 PROCEDURE — 2709999900 HC NON-CHARGEABLE SUPPLY: Performed by: PAIN MEDICINE

## 2020-01-14 PROCEDURE — 6360000002 HC RX W HCPCS: Performed by: PAIN MEDICINE

## 2020-01-14 PROCEDURE — 7100000010 HC PHASE II RECOVERY - FIRST 15 MIN: Performed by: PAIN MEDICINE

## 2020-01-14 RX ORDER — LIDOCAINE HYDROCHLORIDE 5 MG/ML
INJECTION, SOLUTION INFILTRATION; INTRAVENOUS PRN
Status: DISCONTINUED | OUTPATIENT
Start: 2020-01-14 | End: 2020-01-14 | Stop reason: ALTCHOICE

## 2020-01-14 ASSESSMENT — PAIN - FUNCTIONAL ASSESSMENT: PAIN_FUNCTIONAL_ASSESSMENT: 0-10

## 2020-01-14 ASSESSMENT — PAIN DESCRIPTION - DESCRIPTORS: DESCRIPTORS: DISCOMFORT

## 2020-01-14 ASSESSMENT — PAIN SCALES - GENERAL
PAINLEVEL_OUTOF10: 0
PAINLEVEL_OUTOF10: 0

## 2020-01-14 NOTE — OP NOTE
2020    Patient: New Davila  :  1960  Age:  60 y.o. Sex:  male     PRE-OPERATIVE DIAGNOSIS: Right Lumbar spondylosis, lumbar facet syndrome. POST-OPERATIVE DIAGNOSIS: Same. PROCEDURE:  # 1 Fluoroscopic guided lumbar medial branch blocks Right at Levels: L3-4, L4-5, L5-S1. SURGEON: WALI Grimaldo M.D. ANESTHESIA: Local    ESTIMATED BLOOD LOSS: None.  ______________________________________________________________________  BRIEF HISTORY:  New Davila comes in today for 1 fluoroscopic guided lumbar medial branch blocks  Right  at Levels: L3-4, L4-5, L5-S1. The potential complications of this procedure were discussed with him again today. He has elected to undergo the aforementioned procedure. Arlyn complete History & Physical examination were reviewed in depth, a copy of which is in the chart. DESCRIPTION OF PROCEDURE:   After confirming written and informed consent, a time-out was performed and Arlyn name and date of birth, the procedure to be performed as well as the plan for the location of the needle insertion were confirmed. The patient was brought into the procedure room and placed in the prone position on the fluoroscopy table. Standard monitors were placed and vital signs were observed throughout the procedure. The area of the lumbar spine was prepped with chloraprep and draped in a sterile manner. AP fluoroscopy was used to identify and jeet bartons point at the targeted levels. The skin and subcutaneous tissues in these identified areas were anesthetized with 0.5%Lidocaine. A 22 # gauge 3 1/2 spinal needle was advanced toward the junction of the superior articular process and the transverse process, along the course of the medial branch. Satisfactory needle placement was confirmed by AP and oblique projections.   At the sacral alar level, the sacral alar region was visualized and the needle tip was positioned on the sacral alar at the base of the superior
process where the medial branch traverses under fluoroscopic guidance. Once bone was contacted and negative aspiration was confirmed. A solution of 0.25% marcaine with 40 DepoMedrol 3 cc was then injected and distributed equally at each level. Following the procedure Sindy Miller noted improvement of previous pain symptoms. Disposition the patient tolerated the procedure well and there were no complications . Vital signs remained stable throughout the procedure. The patient was escorted to the recovery area where they remained until discharge and written discharge instructions for the procedure were given. Plan: Sindy Miller will return to our pain management center as scheduled.      Xi Pruett MD

## 2020-01-14 NOTE — H&P
Take 15 mg by mouth daily. Yes Historical Provider, MD   ezetimibe (ZETIA) 10 MG tablet Take 10 mg by mouth daily.    Yes Historical Provider, MD   furosemide (LASIX) 20 MG tablet Take 20 mg by mouth 2 times daily as needed  14  Yes Historical Provider, MD   fenofibrate (TRICOR) 145 MG tablet Take 145 mg by mouth daily  14  Yes Historical Provider, MD       No Known Allergies    Social History     Socioeconomic History    Marital status:      Spouse name: Not on file    Number of children: Not on file    Years of education: Not on file    Highest education level: Not on file   Occupational History    Not on file   Social Needs    Financial resource strain: Not on file    Food insecurity:     Worry: Not on file     Inability: Not on file    Transportation needs:     Medical: Not on file     Non-medical: Not on file   Tobacco Use    Smoking status: Former Smoker     Packs/day: 0.00     Years: 25.00     Pack years: 0.00     Last attempt to quit: 2017     Years since quittin.8    Smokeless tobacco: Never Used   Substance and Sexual Activity    Alcohol use: Yes     Comment: social    Drug use: No    Sexual activity: Not on file   Lifestyle    Physical activity:     Days per week: Not on file     Minutes per session: Not on file    Stress: Not on file   Relationships    Social connections:     Talks on phone: Not on file     Gets together: Not on file     Attends Confucianism service: Not on file     Active member of club or organization: Not on file     Attends meetings of clubs or organizations: Not on file     Relationship status: Not on file    Intimate partner violence:     Fear of current or ex partner: Not on file     Emotionally abused: Not on file     Physically abused: Not on file     Forced sexual activity: Not on file   Other Topics Concern    Not on file   Social History Narrative    ** Merged History Encounter **            Family History   Problem Relation Age of
Relation Age of Onset    Heart Disease Father     Heart Disease Maternal Uncle     No Known Problems Mother          REVIEW OF SYSTEMS:    CONSTITUTIONAL:  negative for  fevers, chills, sweats and fatigue    RESPIRATORY:  negative for  dry cough, cough with sputum, dyspnea, wheezing and chest pain    CARDIOVASCULAR:  negative for chest pain, dyspnea, palpitations, syncope    GASTROINTESTINAL:  negative for nausea, vomiting, change in bowel habits, diarrhea, constipation and abdominal pain    MUSCULOSKELETAL: negative for muscle weakness    SKIN: negative for itching or rashes. BEHAVIOR/PSYCH:  negative for poor appetite, increased appetite, decreased sleep and poor concentration    All other systems negative      PHYSICAL EXAM:    VITALS:  /80   Pulse 88   Resp 16   SpO2 95%     CONSTITUTIONAL:  awake, alert, cooperative, no apparent distress, and appears stated age    EYES: PERRLA, EOMI    LUNGS:  No increased work of breathing, no audible wheezing    CARDIOVASCULAR:  regular rate and rhythm    ABDOMEN:  Soft non tender non distended     EXTREMITIES: no signs of clubbing or cyanosis. MUSCULOSKELETAL: negative for flaccid muscle tone or spastic movements. SKIN: gross examination reveals no signs of rashes, or diaphoresis. NEURO: Cranial nerves II-XII grossly intact. No signs of agitated mood.        Assessment/Plan:    Axial low back pain for bilateral L3,4 MB and L5 DR block

## 2020-01-21 ENCOUNTER — OFFICE VISIT (OUTPATIENT)
Dept: PAIN MANAGEMENT | Age: 60
End: 2020-01-21
Payer: COMMERCIAL

## 2020-01-21 ENCOUNTER — PREP FOR PROCEDURE (OUTPATIENT)
Dept: PAIN MANAGEMENT | Age: 60
End: 2020-01-21

## 2020-01-21 VITALS
WEIGHT: 244 LBS | TEMPERATURE: 98.1 F | SYSTOLIC BLOOD PRESSURE: 104 MMHG | DIASTOLIC BLOOD PRESSURE: 62 MMHG | HEIGHT: 73 IN | BODY MASS INDEX: 32.34 KG/M2 | RESPIRATION RATE: 16 BRPM | OXYGEN SATURATION: 94 % | HEART RATE: 88 BPM

## 2020-01-21 PROCEDURE — G8417 CALC BMI ABV UP PARAM F/U: HCPCS | Performed by: PAIN MEDICINE

## 2020-01-21 PROCEDURE — G8484 FLU IMMUNIZE NO ADMIN: HCPCS | Performed by: PAIN MEDICINE

## 2020-01-21 PROCEDURE — G8427 DOCREV CUR MEDS BY ELIG CLIN: HCPCS | Performed by: PAIN MEDICINE

## 2020-01-21 PROCEDURE — 3017F COLORECTAL CA SCREEN DOC REV: CPT | Performed by: PAIN MEDICINE

## 2020-01-21 PROCEDURE — 99213 OFFICE O/P EST LOW 20 MIN: CPT | Performed by: PAIN MEDICINE

## 2020-01-21 PROCEDURE — 1036F TOBACCO NON-USER: CPT | Performed by: PAIN MEDICINE

## 2020-01-21 RX ORDER — PREGABALIN 75 MG/1
CAPSULE ORAL
COMMUNITY
Start: 2020-01-05 | End: 2020-05-15 | Stop reason: ALTCHOICE

## 2020-01-21 NOTE — PROGRESS NOTES
[address]  [address] [facility], [address] [place] [address] [place]  [phone]    Follow up Note      Dillon Camargo     Date of Visit:  1/21/2020    CC:  Patient presents for follow up   Chief Complaint   Patient presents with    Follow Up After Procedure       # 1 Fluoroscopic guided lumbar medial branch blocks Right at Levels: L2-3,3-4, L4-5. HPI:    Pain is better. Change in quality of symptoms:no. Medication side effects:not applicable . Recent diagnostic testing:none. Recent interventional procedures:Right L2,3,4 MBB more than 70%     He has not been on anticoagulation medications to include none. The patient  has not been on herbal supplements. The patient is not diabetic. Imaging:   Lumbar spine MRI 2019    1.  At L2-3, there is a progressing left paracentral and foraminal disc   osteophyte complex abutting both the exiting L2 nerve and traversing L3   nerve root.  There is progressing moderate central canal stenosis. [place]ose   is progressing moderate to severe right foraminal stenosis. 2.  At L3-4, the central canal is well decompressed.  There is a   persistent left foraminal disc protrusion abutting the exiting L3 nerve.    There is stable moderate right foraminal stenosis. 3.  At L4-5, the central canal is well decompressed.  There is a   persistent right foraminal disc osteophyte complex abutting the exiting   L4 nerve.  There is progressing moderate to severe left foraminal   stenosis. 4.  At L5-S1, the central canal is well decompressed.  There is stable   mild bilateral foraminal stenosis. Anatomic variant: None.  L4-5 is considered the level of the iliac crest   and assume there are 5 lumbar-type vertebrae.     Previous treatments: Physical Therapy, Surgery L3-5 laminectomy and medications. .         Potential Aberrant Drug-Related Behavior:    None     Urine Drug Screening:  None, no opioids started     OARRS report:  01/2020 consistent Past Medical History:   Diagnosis Date    Acid reflux     GERD (gastroesophageal reflux disease)     Hyperlipidemia     Hypertension     Hypokalemia     Hypomagnesemia      Past Surgical History:   Procedure Laterality Date    ANESTHESIA NERVE BLOCK Right 1/14/2020    RIGHT L2,3,4 MEDIAL BRANCH BLOCK WITHOUT SEDATION (CPT 57774,58342) performed by Brittny Vidal MD at [address]      May 4, 2018 decompression with Dr. Duke La.  CARPAL TUNNEL RELEASE Left 1 24 14    CARPAL TUNNEL RELEASE      left    COLONOSCOPY      FRACTURE SURGERY      arm    FRACTURE SURGERY      left arm    HERNIA REPAIR      HERNIA REPAIR      x2 child    KNEE ARTHROSCOPY Left 02/20/15    NERVE BLOCK Right 01/14/2020    TONSILLECTOMY       Prior to Admission medications    Medication Sig Start Date End Date Taking? Authorizing Provider   pregabalin (LYRICA) 75 MG capsule TK 1 C PO BID 1/5/20  Yes Historical Provider, MD   amLODIPine (NORVASC) 5 MG tablet TK 1 T PO QD 11/19/19  Yes Historical Provider, MD   hydrochlorothiazide (HYDRODIURIL) 25 MG tablet TK 1 T PO  QD 12/5/19  Yes Historical Provider, MD   VENTOLIN  (90 Base) MCG/ACT inhaler INL 1 TO 2 PFS PO Q 4 TO 6 H PRN 11/19/19  Yes Historical Provider, MD   losartan (COZAAR) 100 MG tablet Take 1 tablet by mouth daily 9/26/18  Yes Roscoe Ornelas,    ibuprofen (ADVIL;MOTRIN) 800 MG tablet Take 1 tablet by mouth every 6 hours as needed for Pain for up to 90 doses. 3/31/15  Yes Mark Alves,    simvastatin (ZOCOR) 40 MG tablet Take 40 mg by mouth daily    Yes Historical Provider, MD   Lansoprazole (PREVACID PO) Take 15 mg by mouth daily. Yes Historical Provider, MD   ezetimibe (ZETIA) 10 MG tablet Take 10 mg by mouth daily.    Yes Historical Provider, MD   furosemide (LASIX) 20 MG tablet Take 20 mg by mouth 2 times daily as needed  12/22/14  Yes Historical Provider, MD   fenofibrate (TRICOR) 145 MG tablet Take 145 mg by mouth daily

## 2020-01-21 NOTE — PROGRESS NOTES
Sena Shabazz presents to the [facility] on 1/21/2020. Jim Nguyen is complaining of pain in lower back. . The pain is constant. The pain is described as aching. Pain is rated on his best day at a 3, on his worst day at a 8, and on average at a 5 on the VAS scale. He took his last dose of Motrin on yesterday afternoon. Dayana Ferreira does not have issues with constipation. Any procedures since your last visit: Yes, with 60 % relief. He is  on NSAIDS and  is not on anticoagulation medications to include none and is managed by NA. Pacemaker or defibrilator: No Physician managing device is NA.       /62   Pulse 88   Temp 98.1 °F (36.7 °C) (Oral)   Resp 16   Ht 6' 1\" (1.854 m)   Wt 244 lb (110.7 kg)   SpO2 94%   BMI 32.19 kg/m²      No LMP for male patient.

## 2020-02-20 ENCOUNTER — HOSPITAL ENCOUNTER (OUTPATIENT)
Dept: OPERATING ROOM | Age: 60
Setting detail: OUTPATIENT SURGERY
Discharge: HOME | End: 2020-02-20
Attending: PAIN MEDICINE
Payer: COMMERCIAL

## 2020-02-20 ENCOUNTER — HOSPITAL ENCOUNTER (OUTPATIENT)
Age: 60
Setting detail: OUTPATIENT SURGERY
Discharge: HOME | End: 2020-02-20
Attending: PAIN MEDICINE | Admitting: PAIN MEDICINE
Payer: COMMERCIAL

## 2020-02-20 VITALS
OXYGEN SATURATION: 98 % | HEART RATE: 80 BPM | SYSTOLIC BLOOD PRESSURE: 121 MMHG | RESPIRATION RATE: 14 BRPM | DIASTOLIC BLOOD PRESSURE: 86 MMHG

## 2020-02-20 PROCEDURE — 2500000003 HC RX 250 WO HCPCS: Performed by: PAIN MEDICINE

## 2020-02-20 PROCEDURE — 64493 INJ PARAVERT F JNT L/S 1 LEV: CPT | Performed by: PAIN MEDICINE

## 2020-02-20 PROCEDURE — 64495 INJ PARAVERT F JNT L/S 3 LEV: CPT | Performed by: PAIN MEDICINE

## 2020-02-20 PROCEDURE — 7100000010 HC PHASE II RECOVERY - FIRST 15 MIN: Performed by: PAIN MEDICINE

## 2020-02-20 PROCEDURE — 6360000002 HC RX W HCPCS: Performed by: PAIN MEDICINE

## 2020-02-20 PROCEDURE — 3600000005 HC SURGERY LEVEL 5 BASE: Performed by: PAIN MEDICINE

## 2020-02-20 PROCEDURE — 7100000011 HC PHASE II RECOVERY - ADDTL 15 MIN: Performed by: PAIN MEDICINE

## 2020-02-20 PROCEDURE — 2709999900 HC NON-CHARGEABLE SUPPLY: Performed by: PAIN MEDICINE

## 2020-02-20 PROCEDURE — 64494 INJ PARAVERT F JNT L/S 2 LEV: CPT | Performed by: PAIN MEDICINE

## 2020-02-20 PROCEDURE — 3209999900 FLUORO FOR SURGICAL PROCEDURES

## 2020-02-20 RX ORDER — LIDOCAINE HYDROCHLORIDE 5 MG/ML
INJECTION, SOLUTION INFILTRATION; INTRAVENOUS PRN
Status: DISCONTINUED | OUTPATIENT
Start: 2020-02-20 | End: 2020-02-20 | Stop reason: ALTCHOICE

## 2020-02-20 ASSESSMENT — PAIN - FUNCTIONAL ASSESSMENT
PAIN_FUNCTIONAL_ASSESSMENT: 0-10
PAIN_FUNCTIONAL_ASSESSMENT: PREVENTS OR INTERFERES SOME ACTIVE ACTIVITIES AND ADLS

## 2020-02-20 ASSESSMENT — PAIN DESCRIPTION - DESCRIPTORS: DESCRIPTORS: ACHING

## 2020-02-20 ASSESSMENT — PAIN SCALES - GENERAL
PAINLEVEL_OUTOF10: 0
PAINLEVEL_OUTOF10: 0

## 2020-02-20 NOTE — H&P
(PREVACID PO) Take 15 mg by mouth daily. Historical Provider, MD   ezetimibe (ZETIA) 10 MG tablet Take 10 mg by mouth daily.     Historical Provider, MD   furosemide (LASIX) 20 MG tablet Take 20 mg by mouth 2 times daily as needed  14   Historical Provider, MD   fenofibrate (TRICOR) 145 MG tablet Take 145 mg by mouth daily  14   Historical Provider, MD       No Known Allergies    Social History     Socioeconomic History    Marital status:      Spouse name: Not on file    Number of children: Not on file    Years of education: Not on file    Highest education level: Not on file   Occupational History    Not on file   Social Needs    Financial resource strain: Not on file    Food insecurity:     Worry: Not on file     Inability: Not on file    Transportation needs:     Medical: Not on file     Non-medical: Not on file   Tobacco Use    Smoking status: Former Smoker     Packs/day: 0.00     Years: 25.00     Pack years: 0.00     Last attempt to quit: 2017     Years since quittin.9    Smokeless tobacco: Never Used   Substance and Sexual Activity    Alcohol use: Yes     Comment: social    Drug use: No    Sexual activity: Not on file   Lifestyle    Physical activity:     Days per week: Not on file     Minutes per session: Not on file    Stress: Not on file   Relationships    Social connections:     Talks on phone: Not on file     Gets together: Not on file     Attends Scientology service: Not on file     Active member of club or organization: Not on file     Attends meetings of clubs or organizations: Not on file     Relationship status: Not on file    Intimate partner violence:     Fear of current or ex partner: Not on file     Emotionally abused: Not on file     Physically abused: Not on file     Forced sexual activity: Not on file   Other Topics Concern    Not on file   Social History Narrative    ** Merged History Encounter **            Family History   Problem Relation Age of Onset    Heart Disease Father     Heart Disease Maternal Uncle     No Known Problems Mother          REVIEW OF SYSTEMS:    CONSTITUTIONAL:  negative for  fevers, chills, sweats and fatigue    RESPIRATORY:  negative for  dry cough, cough with sputum, dyspnea, wheezing and chest pain    CARDIOVASCULAR:  negative for chest pain, dyspnea, palpitations, syncope    GASTROINTESTINAL:  negative for nausea, vomiting, change in bowel habits, diarrhea, constipation and abdominal pain    MUSCULOSKELETAL: negative for muscle weakness    SKIN: negative for itching or rashes. BEHAVIOR/PSYCH:  negative for poor appetite, increased appetite, decreased sleep and poor concentration    All other systems negative      PHYSICAL EXAM:    VITALS:  /73   Pulse 81   Resp 20   SpO2 94%     CONSTITUTIONAL:  awake, alert, cooperative, no apparent distress, and appears stated age    EYES: PERRLA, EOMI    LUNGS:  No increased work of breathing, no audible wheezing    CARDIOVASCULAR:  regular rate and rhythm    ABDOMEN:  Soft non tender non distended     EXTREMITIES: no signs of clubbing or cyanosis. MUSCULOSKELETAL: negative for flaccid muscle tone or spastic movements. SKIN: gross examination reveals no signs of rashes, or diaphoresis. NEURO: Cranial nerves II-XII grossly intact. No signs of agitated mood.        Assessment/Plan:    Axial low back pain for right L2,3,4 MBB

## 2020-02-20 NOTE — OP NOTE
2020    Patient: Elsy Blackburn  :  1960  Age:  63 y.o. Sex:  male     PRE-OPERATIVE DIAGNOSIS: Right Lumbar spondylosis, lumbar facet syndrome. POST-OPERATIVE DIAGNOSIS: Same. PROCEDURE:  # 1 Fluoroscopic guided lumbar medial branch blocks Right at Levels: L2-3,3-4, L4-5. SURGEON: WALI Smith M.D. ANESTHESIA: Local    ESTIMATED BLOOD LOSS: None.  ______________________________________________________________________  BRIEF HISTORY:  Elsy Blackburn comes in today for 1 fluoroscopic guided lumbar medial branch blocks  Right  at Levels: L2-3,3-4, L4-5. The potential complications of this procedure were discussed with him again today. He has elected to undergo the aforementioned procedure. Arlyn complete History & Physical examination were reviewed in depth, a copy of which is in the chart. DESCRIPTION OF PROCEDURE:   After confirming written and informed consent, a time-out was performed and Arlyn name and date of birth, the procedure to be performed as well as the plan for the location of the needle insertion were confirmed. The patient was brought into the procedure room and placed in the prone position on the fluoroscopy table. Standard monitors were placed and vital signs were observed throughout the procedure. The area of the lumbar spine was prepped with chloraprep and draped in a sterile manner. AP fluoroscopy was used to identify and jeet bartons point at the targeted levels. The skin and subcutaneous tissues in these identified areas were anesthetized with 0.5%Lidocaine. A 22 # gauge 5 inch spinal needle was advanced toward the junction of the superior articular process and the transverse process, along the course of the medial branch. Satisfactory needle placement was confirmed by AP and oblique projections.   At the sacral alar level, the sacral alar region was visualized and the needle tip was positioned on the sacral alar at the base of the superior

## 2020-02-27 ENCOUNTER — OFFICE VISIT (OUTPATIENT)
Dept: PAIN MANAGEMENT | Age: 60
End: 2020-02-27
Payer: COMMERCIAL

## 2020-02-27 ENCOUNTER — PREP FOR PROCEDURE (OUTPATIENT)
Dept: PAIN MANAGEMENT | Age: 60
End: 2020-02-27

## 2020-02-27 VITALS
OXYGEN SATURATION: 98 % | WEIGHT: 244 LBS | DIASTOLIC BLOOD PRESSURE: 66 MMHG | RESPIRATION RATE: 16 BRPM | HEART RATE: 89 BPM | TEMPERATURE: 97.7 F | HEIGHT: 73 IN | SYSTOLIC BLOOD PRESSURE: 102 MMHG | BODY MASS INDEX: 32.34 KG/M2

## 2020-02-27 PROCEDURE — 99213 OFFICE O/P EST LOW 20 MIN: CPT | Performed by: PAIN MEDICINE

## 2020-02-27 PROCEDURE — G8484 FLU IMMUNIZE NO ADMIN: HCPCS | Performed by: PAIN MEDICINE

## 2020-02-27 PROCEDURE — G8417 CALC BMI ABV UP PARAM F/U: HCPCS | Performed by: PAIN MEDICINE

## 2020-02-27 PROCEDURE — 3017F COLORECTAL CA SCREEN DOC REV: CPT | Performed by: PAIN MEDICINE

## 2020-02-27 PROCEDURE — 1036F TOBACCO NON-USER: CPT | Performed by: PAIN MEDICINE

## 2020-02-27 PROCEDURE — G8427 DOCREV CUR MEDS BY ELIG CLIN: HCPCS | Performed by: PAIN MEDICINE

## 2020-02-27 NOTE — PROGRESS NOTES
Sabrina Hernandez presents to the [facility] on 2/27/2020. May De Oliveira is complaining of pain lower back. The pain is constant. The pain is described as aching, throbbing, dull, numb and tiring. Pain is rated on his best day at a 5, on his worst day at a 9, and on average at a 7 on the VAS scale. He took his last dose of Motrin noon. May De Oliveira does not have issues with constipation. Any procedures since your last visit: Yes, with 75 % relief. For a few days    He is  on NSAIDS and  is not on anticoagulation medications to include none and is managed by . Pacemaker or defibrilator: No Physician managing device is . /66   Pulse 89   Temp 97.7 °F (36.5 °C) (Oral)   Resp 16   Ht 6' 1\" (1.854 m)   Wt 244 lb (110.7 kg)   SpO2 98%   BMI 32.19 kg/m²      No LMP for male patient.

## 2020-02-27 NOTE — PROGRESS NOTES
[facility]  [address] [facility], [address] [place] [address] [place]  [phone]    Follow up Note      Karl Raymond     Date of Visit:  2/27/2020    CC:  Patient presents for follow up   Chief Complaint   Patient presents with    Follow-up     lower back     HPI:    Pain is better. Change in quality of symptoms:no. Medication side effects:not applicable . Recent diagnostic testing:none. Recent interventional procedures:Right L2,3,4 MBB more than 70% for more than 5 days     He has not been on anticoagulation medications to include none. The patient  has not been on herbal supplements. The patient is not diabetic. Imaging:   Lumbar spine MRI 2019    1.  At L2-3, there is a progressing left paracentral and foraminal disc   osteophyte complex abutting both the exiting L2 nerve and traversing L3   nerve root.  There is progressing moderate central canal stenosis. Jennifer Distel   is progressing moderate to severe right foraminal stenosis. 2.  At L3-4, the central canal is well decompressed.  There is a   persistent left foraminal disc protrusion abutting the exiting L3 nerve.    There is stable moderate right foraminal stenosis. 3.  At L4-5, the central canal is well decompressed.  There is a   persistent right foraminal disc osteophyte complex abutting the exiting   L4 nerve.  There is progressing moderate to severe left foraminal   stenosis. 4.  At L5-S1, the central canal is well decompressed.  There is stable   mild bilateral foraminal stenosis. Anatomic variant: None.  L4-5 is considered the level of the iliac crest   and assume there are 5 lumbar-type vertebrae.     Previous treatments: Physical Therapy, Surgery L3-5 laminectomy and medications. .         Potential Aberrant Drug-Related Behavior:    None     Urine Drug Screening:  None, no opioids started     OARRS report:  02/2020 consistent     Past Medical History:   Diagnosis Date    Acid reflux     GERD (gastroesophageal reflux disease)     Hyperlipidemia     Hypertension     Hypokalemia     Hypomagnesemia      Past Surgical History:   Procedure Laterality Date    ANESTHESIA NERVE BLOCK Right 1/14/2020    RIGHT L2,3,4 MEDIAL BRANCH BLOCK WITHOUT SEDATION (CPT 96479,68391) performed by Linda Shaffer MD at [address] Right 2/20/2020    RIGHT L2,3,4 MEDIAL BRANCH BLOCK (CPT 76653,65831) performed by Linda Shaffer MD at [address] [facility]      May 4, 2018 decompression with Dr. Radha Boothe.  CARPAL TUNNEL RELEASE Left 1 24 14    CARPAL TUNNEL RELEASE      left    COLONOSCOPY      FRACTURE SURGERY      arm    FRACTURE SURGERY      left arm    HERNIA REPAIR      HERNIA REPAIR      x2 child    KNEE ARTHROSCOPY Left 02/20/15    NERVE BLOCK Right 01/14/2020    NERVE BLOCK Right 02/20/2020    L2,3,4 medial     TONSILLECTOMY       Prior to Admission medications    Medication Sig Start Date End Date Taking? Authorizing Provider   pregabalin (LYRICA) 75 MG capsule TK 1 C PO BID 1/5/20  Yes Historical Provider, MD   amLODIPine (NORVASC) 5 MG tablet TK 1 T PO QD 11/19/19  Yes Historical Provider, MD   hydrochlorothiazide (HYDRODIURIL) 25 MG tablet TK 1 T PO  QD 12/5/19  Yes Historical Provider, MD   VENTOLIN  (90 Base) MCG/ACT inhaler INL 1 TO 2 PFS PO Q 4 TO 6 H PRN 11/19/19  Yes Historical Provider, MD   losartan (COZAAR) 100 MG tablet Take 1 tablet by mouth daily 9/26/18  Yes Roscoe Ornelas, DO   ibuprofen (ADVIL;MOTRIN) 800 MG tablet Take 1 tablet by mouth every 6 hours as needed for Pain for up to 90 doses. 3/31/15  Yes Modesto Neal, DO   simvastatin (ZOCOR) 40 MG tablet Take 40 mg by mouth daily    Yes Historical Provider, MD   Lansoprazole (PREVACID PO) Take 15 mg by mouth daily. Yes Historical Provider, MD   ezetimibe (ZETIA) 10 MG tablet Take 10 mg by mouth daily.    Yes Historical Provider, MD   furosemide (LASIX) 20 MG tablet Take 20 mg by mouth 2 complaints. All other review of systems was negative. PHYSICAL EXAMINATION:      /66   Pulse 89   Temp 97.7 °F (36.5 °C) (Oral)   Resp 16   Ht 6' 1\" (1.854 m)   Wt 244 lb (110.7 kg)   SpO2 98%   BMI 32.19 kg/m²     General:       General appearance:   pleasant and well-hydrated. , in mild to moderate discomfort and A & O x3  Build:Overweight     HEENT:     Head:normocephalic and atraumatic  Pupils:regular, round and equal.  Sclera: icterus absent,      Lungs:     Breathing:Breathing Pattern: regular, no distress     Abdomen:     Shape:obese and non-distended  Tenderness:none     Lumbar spine:     Spine inspection:surgical incision scar   CVA tenderness:No   Palpation:tenderness paravertebral muscles, facet loading, right, positive and tenderness. Range of motion:abnormal moderately Lateral bending, flexion, extension rotation right and is  painful.     Musculoskeletal:     Trigger points in Paraveteral:absent bilaterally  SI joint tenderness:negative right, negative left              TRAE test:negative right, negative left  Piriformis tenderness:negative right, negative left  Trochanteric bursa tenderness:negative right, negative left  SLR:negative right, negative left, sitting      Extremities:     Tremors:None bilaterally upper and lower  Range of motion: pain with internal rotation of hips negative.   Intact:Yes  Edema:Normal     Neurological:     Sensory:diminished to light touch lateral aspect of right leg  Motor:                              Right Quadriceps5/5          Left Quadriceps5/5           Right Gastrocnemius5/5    Left Gastrocnemius5/5  Right Ant Tibialis5/5  Left Ant Tibialis5/5  Reflexes:    Right Quadriceps reflex2+  Left Quadriceps reflex2+  Right Achilles reflex2+  Left Achilles reflex2+  Gait:normal     Dermatology:     Skin:no unusual rashes and no skin lesions     Impression:  Low back pain with occasional radiation to the right lower extremity with numbness in the right

## 2020-03-24 ENCOUNTER — TELEPHONE (OUTPATIENT)
Dept: PAIN MANAGEMENT | Age: 60
End: 2020-03-24

## 2020-03-24 NOTE — TELEPHONE ENCOUNTER
3-24-20-received a message that [facility] ([facility]) has postponed all out patient procedures due to the Coronavirus Crisis. Call to John Mccallum, advised him his procedure with Dr Devyn Potter on 4-2-20 has been postponed. He shows an understanding.     Caryl Rodriguez RN  Pain Management

## 2020-04-29 ENCOUNTER — TELEPHONE (OUTPATIENT)
Dept: PAIN MANAGEMENT | Age: 60
End: 2020-04-29

## 2020-04-29 RX ORDER — DIAZEPAM 5 MG/1
TABLET ORAL
Qty: 1 TABLET | Refills: 0 | OUTPATIENT
Start: 2020-04-29 | End: 2020-04-29

## 2020-04-30 ENCOUNTER — HOSPITAL ENCOUNTER (OUTPATIENT)
Age: 60
Discharge: HOME | End: 2020-05-02
Payer: COMMERCIAL

## 2020-04-30 PROCEDURE — U0003 INFECTIOUS AGENT DETECTION BY NUCLEIC ACID (DNA OR RNA); SEVERE ACUTE RESPIRATORY SYNDROME CORONAVIRUS 2 (SARS-COV-2) (CORONAVIRUS DISEASE [COVID-19]), AMPLIFIED PROBE TECHNIQUE, MAKING USE OF HIGH THROUGHPUT TECHNOLOGIES AS DESCRIBED BY CMS-2020-01-R: HCPCS

## 2020-05-03 LAB
SARS-COV-2: NOT DETECTED
SOURCE: NORMAL

## 2020-05-04 ENCOUNTER — HOSPITAL ENCOUNTER (OUTPATIENT)
Age: 60
Setting detail: OUTPATIENT SURGERY
Discharge: HOME | End: 2020-05-04
Attending: PAIN MEDICINE | Admitting: PAIN MEDICINE
Payer: COMMERCIAL

## 2020-05-04 ENCOUNTER — HOSPITAL ENCOUNTER (OUTPATIENT)
Dept: OPERATING ROOM | Age: 60
Setting detail: OUTPATIENT SURGERY
Discharge: HOME | End: 2020-05-04
Attending: PAIN MEDICINE
Payer: COMMERCIAL

## 2020-05-04 VITALS
DIASTOLIC BLOOD PRESSURE: 90 MMHG | SYSTOLIC BLOOD PRESSURE: 141 MMHG | HEART RATE: 82 BPM | RESPIRATION RATE: 14 BRPM | OXYGEN SATURATION: 99 %

## 2020-05-04 PROCEDURE — 2500000003 HC RX 250 WO HCPCS: Performed by: PAIN MEDICINE

## 2020-05-04 PROCEDURE — 6360000002 HC RX W HCPCS: Performed by: PAIN MEDICINE

## 2020-05-04 PROCEDURE — 7100000010 HC PHASE II RECOVERY - FIRST 15 MIN: Performed by: PAIN MEDICINE

## 2020-05-04 PROCEDURE — 3600000005 HC SURGERY LEVEL 5 BASE: Performed by: PAIN MEDICINE

## 2020-05-04 PROCEDURE — 2709999900 HC NON-CHARGEABLE SUPPLY: Performed by: PAIN MEDICINE

## 2020-05-04 PROCEDURE — 3209999900 FLUORO FOR SURGICAL PROCEDURES

## 2020-05-04 PROCEDURE — 7100000011 HC PHASE II RECOVERY - ADDTL 15 MIN: Performed by: PAIN MEDICINE

## 2020-05-04 PROCEDURE — C1713 ANCHOR/SCREW BN/BN,TIS/BN: HCPCS | Performed by: PAIN MEDICINE

## 2020-05-04 PROCEDURE — 64635 DESTROY LUMB/SAC FACET JNT: CPT | Performed by: PAIN MEDICINE

## 2020-05-04 PROCEDURE — 64636 DESTROY L/S FACET JNT ADDL: CPT | Performed by: PAIN MEDICINE

## 2020-05-04 PROCEDURE — 3600000015 HC SURGERY LEVEL 5 ADDTL 15MIN: Performed by: PAIN MEDICINE

## 2020-05-04 RX ORDER — LIDOCAINE HYDROCHLORIDE 20 MG/ML
INJECTION, SOLUTION EPIDURAL; INFILTRATION; INTRACAUDAL; PERINEURAL PRN
Status: DISCONTINUED | OUTPATIENT
Start: 2020-05-04 | End: 2020-05-04 | Stop reason: ALTCHOICE

## 2020-05-04 ASSESSMENT — PAIN - FUNCTIONAL ASSESSMENT: PAIN_FUNCTIONAL_ASSESSMENT: 0-10

## 2020-05-04 ASSESSMENT — PAIN SCALES - GENERAL
PAINLEVEL_OUTOF10: 5
PAINLEVEL_OUTOF10: 4

## 2020-05-04 NOTE — H&P
Take 1 tablet by mouth every 6 hours as needed for Pain for up to 90 doses. 3/31/15  Yes Yaya Boyle DO   simvastatin (ZOCOR) 40 MG tablet Take 40 mg by mouth daily    Yes Historical Provider, MD   Lansoprazole (PREVACID PO) Take 15 mg by mouth daily. Yes Historical Provider, MD   ezetimibe (ZETIA) 10 MG tablet Take 10 mg by mouth daily.    Yes Historical Provider, MD   furosemide (LASIX) 20 MG tablet Take 20 mg by mouth 2 times daily as needed  12/22/14  Yes Historical Provider, MD   fenofibrate (TRICOR) 145 MG tablet Take 145 mg by mouth daily  11/16/14  Yes Historical Provider, MD       No Known Allergies    Social History     Socioeconomic History    Marital status:      Spouse name: Not on file    Number of children: Not on file    Years of education: Not on file    Highest education level: Not on file   Occupational History    Not on file   Social Needs    Financial resource strain: Not on file    Food insecurity     Worry: Not on file     Inability: Not on file    Transportation needs     Medical: Not on file     Non-medical: Not on file   Tobacco Use    Smoking status: Former Smoker     Packs/day: 0.00     Years: 25.00     Pack years: 0.00     Last attempt to quit: 2/22/2017     Years since quitting: 3.1    Smokeless tobacco: Never Used   Substance and Sexual Activity    Alcohol use: Yes     Comment: social    Drug use: No    Sexual activity: Not on file   Lifestyle    Physical activity     Days per week: Not on file     Minutes per session: Not on file    Stress: Not on file   Relationships    Social connections     Talks on phone: Not on file     Gets together: Not on file     Attends Alevism service: Not on file     Active member of club or organization: Not on file     Attends meetings of clubs or organizations: Not on file     Relationship status: Not on file    Intimate partner violence     Fear of current or ex partner: Not on file     Emotionally abused: Not on file

## 2020-05-04 NOTE — OP NOTE
2020    Patient: Elizabeth Knowles  :  1960  Age:  63 y.o. Sex:  male     PRE-OPERATIVE DIAGNOSIS: Right Lumbar spondylosis, lumbar facet arthropathy. POST-OPERATIVE DIAGNOSIS: Same. PROCEDURE:  Fluoroscopic-guided Right  Lumbar facet medial branch radiofrequency ablation at levels L2,3,4,5.    SURGEON:  WALI Ramirez M.D. ANESTHESIA: Local    ESTIMATED BLOOD LOSS: None.  ______________________________________________________________________  HISTORY & PHYSICAL: Elizabeth Knowles presents today for fluoroscopic-guided Right lumbar facet medial branch radiofrequency ablation at levels L2,3,4,5. The potential complications of the procedure were explained to Lg Arteaga again today and he has elected to undergo the aforementioned procedure. Arlyn complete History & Physical examination were reviewed in depth, a copy of which is in the chart. DESCRIPTION OF PROCEDURE:    After confirming written and informed consent, a time-out was performed and Arlyn name and date of birth, the procedure to be performed as well as the plan for the location of the needle insertion were confirmed. The patient was brought into the procedure room and placed in the prone position on the fluoroscopy table. Standard monitors were placed and vital signs were observed throughout the procedure. The area of the lumbar spine and upper buttocks was sterilely prepped with chloraprep and draped in a sterile manner. AP fluoroscopy was used to identify and jeet bartons point at the targeted area. A 22 gauge 10 mm radiofrequency probe was advanced toward each of these points. Once bone was contacted, negative aspiration for blood and CSF was confirmed, sensory stimulation was performed at 50 Hz and at 0.4 volts generating a pressure sensation. Motor stimulation < 2.0 volts elicited multifidus twitching without any radicular symptoms.  1 ml of 2% lidocaine was injected prior to lesioning, which was performed for 90 seconds

## 2020-05-15 ENCOUNTER — VIRTUAL VISIT (OUTPATIENT)
Dept: PAIN MANAGEMENT | Age: 60
End: 2020-05-15
Payer: COMMERCIAL

## 2020-05-15 PROCEDURE — 3017F COLORECTAL CA SCREEN DOC REV: CPT | Performed by: PAIN MEDICINE

## 2020-05-15 PROCEDURE — G8427 DOCREV CUR MEDS BY ELIG CLIN: HCPCS | Performed by: PAIN MEDICINE

## 2020-05-15 PROCEDURE — 99212 OFFICE O/P EST SF 10 MIN: CPT | Performed by: PAIN MEDICINE

## 2020-05-15 NOTE — PROGRESS NOTES
[address]  [address] [facility], [address] [place] [address] [place]  [phone]    Tele health follow up Note      Loly Praveen     Date of Visit:  5/15/2020    CC:  Tele health follow up   Chief Complaint   Patient presents with    Follow-up     LOWER  BACK       Consent:  Telehealth Visit due to Florencio 19 pandemic  The patient and/or health care decision maker is aware that he/she may receive a bill for this tele-health service Doxy Me, depending on his insurance coverage, and has provided verbal consent to proceed: Yes  I have considered the risks of abuse, dependence, addiction and diversion. My patient is aware that they will need a follow-up visit (in-person or virtually) at the appropriate time indicated for continued medications. Further, my patient is aware that when this acute crisis has lifted, they will be expected to return for an in-person visit and all elements of standard local and hospital guidelines in order to continue this medication. Patient Location:Home   Physician Location: Home     HPI:    Pain is better. Change in quality of symptoms:no. Medication side effects:not applicable . Recent diagnostic testing:none. Recent interventional procedures:Right L2,3,4,5 MBB RFA more than 40%. Procedure was done 10 days ago.     He has not been on anticoagulation medications to include none. The patient  has not been on herbal supplements. The patient is not diabetic.     Imaging:   Lumbar spine MRI 2019    1.  At L2-3, there is a progressing left paracentral and foraminal disc   osteophyte complex abutting both the exiting L2 nerve and traversing L3   nerve root.  There is progressing moderate central canal stenosis. Frandy Cure   is progressing moderate to severe right foraminal stenosis.     2.  At L3-4, the central canal is well decompressed.  There is a   persistent left foraminal disc protrusion abutting the exiting L3 nerve.    There is stable moderate right

## 2020-06-05 ENCOUNTER — HOSPITAL ENCOUNTER (OUTPATIENT)
Age: 60
Discharge: HOME | End: 2020-06-07
Payer: COMMERCIAL

## 2020-06-12 ENCOUNTER — OFFICE VISIT (OUTPATIENT)
Dept: PAIN MANAGEMENT | Age: 60
End: 2020-06-12
Payer: COMMERCIAL

## 2020-06-12 ENCOUNTER — PREP FOR PROCEDURE (OUTPATIENT)
Dept: PAIN MANAGEMENT | Age: 60
End: 2020-06-12

## 2020-06-12 VITALS
RESPIRATION RATE: 16 BRPM | HEART RATE: 72 BPM | SYSTOLIC BLOOD PRESSURE: 122 MMHG | BODY MASS INDEX: 33.13 KG/M2 | WEIGHT: 250 LBS | HEIGHT: 73 IN | TEMPERATURE: 97.8 F | DIASTOLIC BLOOD PRESSURE: 64 MMHG

## 2020-06-12 PROCEDURE — G8427 DOCREV CUR MEDS BY ELIG CLIN: HCPCS | Performed by: PAIN MEDICINE

## 2020-06-12 PROCEDURE — 99213 OFFICE O/P EST LOW 20 MIN: CPT | Performed by: PAIN MEDICINE

## 2020-06-12 PROCEDURE — G8417 CALC BMI ABV UP PARAM F/U: HCPCS | Performed by: PAIN MEDICINE

## 2020-06-12 PROCEDURE — 3017F COLORECTAL CA SCREEN DOC REV: CPT | Performed by: PAIN MEDICINE

## 2020-06-12 PROCEDURE — 1036F TOBACCO NON-USER: CPT | Performed by: PAIN MEDICINE

## 2020-06-12 RX ORDER — IBUPROFEN 800 MG/1
800 TABLET ORAL EVERY 8 HOURS PRN
COMMUNITY

## 2020-06-12 NOTE — PROGRESS NOTES
Do you currently have any of the following:    Fever: No  Headache:  No  Cough: No  Shortness of breath: No  Exposed to anyone with these symptoms: No                                                                                                                Renny Alcaraz presents to the [facility] on 6/12/2020. Sapna Dwyer is complaining of pain in his low back. The pain is persistent. The pain is described as aching and dull. Pain is rated on his best day at a 6, on his worst day at a 9, and on average at a 7 on the VAS scale. He took his last dose of Motrin today. Sapna Dwyer does not have issues with constipation. Any procedures since your last visit: Yes,  5-4-20RIGHT L2, 3, 4 MEDIAL BRANCH L5 DORSALRAMUS RADIOFREQUENCY ABLATION  Had 10 percent relief until today. He is not on NSAIDS and  is not on anticoagulation medications to include none. Pacemaker or defibrilator: No.       /64   Pulse 72   Temp 97.8 °F (36.6 °C) (Oral)   Resp 16   Ht 6' 1\" (1.854 m)   Wt 250 lb (113.4 kg)   BMI 32.98 kg/m²      No LMP for male patient.

## 2020-06-12 NOTE — PROGRESS NOTES
[address]  [address] [facility], [address] [place] [address]  [phone]    Tele health follow up Note      Alvin Moreau     Date of Visit:  6/12/2020    CC:  Tele health follow up   Chief Complaint   Patient presents with    Follow-up    Back Pain     low back pain    Pain     HPI:    Right sided low back pain is better. Increased Left sided low back pain. Change in quality of symptoms:no. Medication side effects:not applicable . Recent diagnostic testing:none. Recent interventional procedures:none     He has not been on anticoagulation medications to include none. The patient  has not been on herbal supplements. The patient is not diabetic.     Imaging:   Lumbar spine MRI 2019    1.  At L2-3, there is a progressing left paracentral and foraminal disc   osteophyte complex abutting both the exiting L2 nerve and traversing L3   nerve root.  There is progressing moderate central canal stenosis. Lieutenant Lines   is progressing moderate to severe right foraminal stenosis. 2.  At L3-4, the central canal is well decompressed.  There is a   persistent left foraminal disc protrusion abutting the exiting L3 nerve.    There is stable moderate right foraminal stenosis. 3.  At L4-5, the central canal is well decompressed.  There is a   persistent right foraminal disc osteophyte complex abutting the exiting   L4 nerve.  There is progressing moderate to severe left foraminal   stenosis. 4.  At L5-S1, the central canal is well decompressed.  There is stable   mild bilateral foraminal stenosis. Anatomic variant: None.  L4-5 is considered the level of the iliac crest   and assume there are 5 lumbar-type vertebrae.     Previous treatments: Physical Therapy, Surgery L3-5 laminectomy and medications. Chad Herrmann                                        Potential Aberrant Drug-Related Behavior:    None      Urine Drug Screening:  None, no opioids started      OARRS report:  06/2020 consistent Past Medical History: Reviewed    Past Surgical History: Reviewed     Home Medications: Reviewed    Allergies: Reviewed     Social History: Reviewed     REVIEW OF SYSTEMS:     Florin Reed denies fever/chills, chest pain, shortness of breath, new bowel or bladder complaints. All other review of systems was negative. PHYSICAL EXAMINATION:      General:       General appearance:   pleasant and well-hydrated. , in mild to moderate discomfort and A & O x3  Build:Overweight     HEENT:     Head:normocephalic and atraumatic  Pupils:regular, round and equal.  Sclera: icterus absent,      Lungs:     Breathing:Breathing Pattern: regular, no distress     Abdomen:     Shape:obese and non-distended  Tenderness:none     Lumbar spine:     Spine inspection:surgical incision scar   CVA tenderness:No   Palpation:tenderness paravertebral muscles, facet loading, Left, positive and tenderness. Right negative  Range of motion:abnormal moderately Lateral bending, flexion, extension rotation Left and is  painful.     Musculoskeletal:     Trigger points in Paraveteral:absent bilaterally  SI joint tenderness:negative right, negative left              TRAE test:negative right, negative left  Piriformis tenderness:negative right, negative left  Trochanteric bursa tenderness:negative right, negative left  SLR:negative right, negative left, sitting      Extremities:     Tremors:None bilaterally upper and lower  Range of motion: pain with internal rotation of hips negative.   Intact:Yes  Edema:Normal     Neurological:     Sensory:diminished to light touch lateral aspect of right leg  Motor:                              Right Quadriceps5/5          Left Quadriceps5/5           Right Gastrocnemius5/5    Left Gastrocnemius5/5  Right Ant Tibialis5/5  Left Ant Tibialis5/5  Reflexes:    Right Quadriceps reflex2+  Left Quadriceps reflex2+  Right Achilles reflex2+  Left Achilles reflex2+  Gait:normal     Dermatology:     Skin:no unusual rashes and no skin lesions    Impression:    Low back pain with occasional radiation to the right lower extremity with numbness in the right leg  Patient had L3-5 laminectomy in 2018  Lumbar   Patient had seen neurosurgery and recommendations were made for possible L2-5 fusion if conservative measures fails  Plan:  Follow up on his low back pain with complaints of increased Left sided low back pain with no radiation. On exam patient has Left sided facet tenderness on exam, no facet tenderness on the right. Patient continues to benefit from Right L2,3,4 MB RFA. Discussed Left sided lumbar facet MBB at L2,3,4 MBB and possible RFA if his pain improves. Continue with OTC pain medications. OARRS report reviewed 06/2020. Patient encouraged to stay active and to lose weight. Treatment plan discussed with the patient including procedure side effects. We discussed with the patient that combining opioids, benzodiazepines, alcohol, illicit drugs or sleep aids increases the risk of respiratory depression including death. We discussed that these medications may cause drowsiness, sedation or dizziness and have counseled the patient not to drive or operate machinery. We have discussed that these medications will be prescribed only by one provider. We have discussed with the patient about age related risk factors and have thoroughly discussed the importance of taking these medications as prescribed. The patient verbalizes understanding.         Cc: Referring physician

## 2020-06-18 ENCOUNTER — HOSPITAL ENCOUNTER (OUTPATIENT)
Age: 60
Discharge: HOME | End: 2020-06-20
Payer: COMMERCIAL

## 2020-06-18 LAB
ALBUMIN SERPL-MCNC: 4.4 G/DL (ref 3.5–5.2)
ALP BLD-CCNC: 45 U/L (ref 40–129)
ALT SERPL-CCNC: 17 U/L (ref 0–40)
ANION GAP SERPL CALCULATED.3IONS-SCNC: 15 MMOL/L (ref 7–16)
AST SERPL-CCNC: 22 U/L (ref 0–39)
BILIRUB SERPL-MCNC: 0.4 MG/DL (ref 0–1.2)
BUN BLDV-MCNC: 18 MG/DL (ref 8–23)
CALCIUM SERPL-MCNC: 11 MG/DL (ref 8.6–10.2)
CHLORIDE BLD-SCNC: 94 MMOL/L (ref 98–107)
CO2: 27 MMOL/L (ref 22–29)
CREAT SERPL-MCNC: 0.9 MG/DL (ref 0.7–1.2)
GFR AFRICAN AMERICAN: >60
GFR NON-AFRICAN AMERICAN: >60 ML/MIN/1.73
GLUCOSE BLD-MCNC: 118 MG/DL (ref 74–99)
POTASSIUM SERPL-SCNC: 3.7 MMOL/L (ref 3.5–5)
SODIUM BLD-SCNC: 136 MMOL/L (ref 132–146)
TOTAL PROTEIN: 7.7 G/DL (ref 6.4–8.3)

## 2020-06-18 PROCEDURE — 80053 COMPREHEN METABOLIC PANEL: CPT

## 2020-06-23 ENCOUNTER — HOSPITAL ENCOUNTER (OUTPATIENT)
Age: 60
Discharge: HOME | End: 2020-06-25
Payer: COMMERCIAL

## 2020-06-23 PROCEDURE — U0003 INFECTIOUS AGENT DETECTION BY NUCLEIC ACID (DNA OR RNA); SEVERE ACUTE RESPIRATORY SYNDROME CORONAVIRUS 2 (SARS-COV-2) (CORONAVIRUS DISEASE [COVID-19]), AMPLIFIED PROBE TECHNIQUE, MAKING USE OF HIGH THROUGHPUT TECHNOLOGIES AS DESCRIBED BY CMS-2020-01-R: HCPCS

## 2020-06-25 LAB
SARS-COV-2: NOT DETECTED
SOURCE: NORMAL

## 2020-06-29 ENCOUNTER — HOSPITAL ENCOUNTER (OUTPATIENT)
Age: 60
Setting detail: OUTPATIENT SURGERY
Discharge: HOME | End: 2020-06-29
Attending: PAIN MEDICINE | Admitting: PAIN MEDICINE
Payer: COMMERCIAL

## 2020-06-29 ENCOUNTER — HOSPITAL ENCOUNTER (OUTPATIENT)
Dept: OPERATING ROOM | Age: 60
Setting detail: OUTPATIENT SURGERY
Discharge: HOME | End: 2020-06-29
Attending: PAIN MEDICINE
Payer: COMMERCIAL

## 2020-06-29 VITALS
DIASTOLIC BLOOD PRESSURE: 84 MMHG | OXYGEN SATURATION: 95 % | TEMPERATURE: 98 F | HEART RATE: 86 BPM | SYSTOLIC BLOOD PRESSURE: 127 MMHG | RESPIRATION RATE: 16 BRPM

## 2020-06-29 PROCEDURE — 64494 INJ PARAVERT F JNT L/S 2 LEV: CPT | Performed by: PAIN MEDICINE

## 2020-06-29 PROCEDURE — 3600000005 HC SURGERY LEVEL 5 BASE: Performed by: PAIN MEDICINE

## 2020-06-29 PROCEDURE — 7100000010 HC PHASE II RECOVERY - FIRST 15 MIN: Performed by: PAIN MEDICINE

## 2020-06-29 PROCEDURE — 7100000011 HC PHASE II RECOVERY - ADDTL 15 MIN: Performed by: PAIN MEDICINE

## 2020-06-29 PROCEDURE — 3209999900 FLUORO FOR SURGICAL PROCEDURES

## 2020-06-29 PROCEDURE — 64493 INJ PARAVERT F JNT L/S 1 LEV: CPT | Performed by: PAIN MEDICINE

## 2020-06-29 PROCEDURE — 6360000002 HC RX W HCPCS: Performed by: PAIN MEDICINE

## 2020-06-29 PROCEDURE — 2709999900 HC NON-CHARGEABLE SUPPLY: Performed by: PAIN MEDICINE

## 2020-06-29 PROCEDURE — 2500000003 HC RX 250 WO HCPCS: Performed by: PAIN MEDICINE

## 2020-06-29 RX ORDER — LIDOCAINE HYDROCHLORIDE 5 MG/ML
INJECTION, SOLUTION INFILTRATION; INTRAVENOUS PRN
Status: DISCONTINUED | OUTPATIENT
Start: 2020-06-29 | End: 2020-06-29 | Stop reason: ALTCHOICE

## 2020-06-29 ASSESSMENT — PAIN SCALES - GENERAL
PAINLEVEL_OUTOF10: 0
PAINLEVEL_OUTOF10: 0

## 2020-06-29 ASSESSMENT — PAIN - FUNCTIONAL ASSESSMENT: PAIN_FUNCTIONAL_ASSESSMENT: 0-10

## 2020-06-29 ASSESSMENT — PAIN DESCRIPTION - DESCRIPTORS: DESCRIPTORS: ACHING

## 2020-06-29 NOTE — OP NOTE
2020    Patient: Vidhi Echols  :  1960  Age:  63 y.o. Sex:  male     PRE-OPERATIVE DIAGNOSIS: Left Lumbar spondylosis, lumbar facet syndrome. POST-OPERATIVE DIAGNOSIS: Same. PROCEDURE:  # 1 Fluoroscopic guided lumbar medial branch blocks Left at Levels: L2,3,4. SURGEON: WALI Silverman M.D. ANESTHESIA: Local    ESTIMATED BLOOD LOSS: None.  ______________________________________________________________________  BRIEF HISTORY:  Vidhi Echols comes in today for 1 fluoroscopic guided lumbar medial branch blocks  Left  at Levels: L2,3,4 MBB. The potential complications of this procedure were discussed with him again today. He has elected to undergo the aforementioned procedure. Arlyn complete History & Physical examination were reviewed in depth, a copy of which is in the chart. DESCRIPTION OF PROCEDURE:   After confirming written and informed consent, a time-out was performed and Arlyn name and date of birth, the procedure to be performed as well as the plan for the location of the needle insertion were confirmed. The patient was brought into the procedure room and placed in the prone position on the fluoroscopy table. Standard monitors were placed and vital signs were observed throughout the procedure. The area of the lumbar spine was prepped with chloraprep and draped in a sterile manner. AP fluoroscopy was used to identify and jeet bartons point at the targeted levels. The skin and subcutaneous tissues in these identified areas were anesthetized with 0.5%Lidocaine. A 22 # gauge 3 1/2 spinal needle was advanced toward the junction of the superior articular process and the transverse process, along the course of the medial branch. Satisfactory needle placement was confirmed by AP and oblique projections.   At the sacral alar level, the sacral alar region was visualized and the needle tip was positioned on the sacral alar at the base of the superior articulating process where the medial branch traverses under fluoroscopic guidance. Once bone was contacted and negative aspiration was confirmed. A solution of 0.25% marcaine with 40 DepoMedrol 3 cc was then injected and distributed equally at each level. Following the procedure Aamir Parker noted improvement of previous pain symptoms. Disposition the patient tolerated the procedure well and there were no complications . Vital signs remained stable throughout the procedure. The patient was escorted to the recovery area where they remained until discharge and written discharge instructions for the procedure were given. Plan: Aamir Parker will return to our pain management center as scheduled.      Sharonda Govea MD

## 2020-06-29 NOTE — H&P
[address]  [address] [facility], [address] [place] [address] [place]  [phone]    Procedure History & Physical      Noelle Mariano     HPI:    Patient  is here for Axial low back pain for Left L2,3,4 MBB  Labs/imaging studies reviewed   All question and concerns addressed including R/B/A associated with the procedure    Past Medical History:   Diagnosis Date    Acid reflux     GERD (gastroesophageal reflux disease)     Hyperlipidemia     Hypertension     Hypokalemia     Hypomagnesemia     Low back pain        Past Surgical History:   Procedure Laterality Date    ANESTHESIA NERVE BLOCK Right 1/14/2020    RIGHT L2,3,4 MEDIAL BRANCH BLOCK WITHOUT SEDATION (CPT 59523,08540) performed by Pavan Lu MD at [facility] Right 2/20/2020    RIGHT L2,3,4 MEDIAL BRANCH BLOCK (CPT 42961,33926) performed by Pavan Lu MD at [facility]      May 4, 2018 decompression with Dr. York Art.  CARPAL TUNNEL RELEASE Left 1 24 14    CARPAL TUNNEL RELEASE      left    COLONOSCOPY      FRACTURE SURGERY      arm    FRACTURE SURGERY      left arm    HERNIA REPAIR      HERNIA REPAIR      x2 child    KNEE ARTHROSCOPY Left 02/20/15    NERVE BLOCK Right 01/14/2020    NERVE BLOCK Right 02/20/2020    L2,3,4 medial     NERVE BLOCK Right 05/04/2020    L2,3,4,5 medial radiofrequency     PAIN MANAGEMENT PROCEDURE Right 5/4/2020    RIGHT L2, 3, 4 MEDIAL BRANCH L5 DORSALRAMUS RADIOFREQUENCY ABLATION performed by Pavan Lu MD at [facility]         Prior to Admission medications    Medication Sig Start Date End Date Taking?  Authorizing Provider   ibuprofen (ADVIL;MOTRIN) 800 MG tablet Take 800 mg by mouth every 8 hours as needed for Pain   Yes Historical Provider, MD   amLODIPine (NORVASC) 5 MG tablet TK 1 T PO QD 11/19/19  Yes Historical Provider, MD   hydrochlorothiazide (HYDRODIURIL) 25 MG tablet TK 1 T PO  QD 12/5/19 Yes Historical Provider, MD   VENTOLIN  (90 Base) MCG/ACT inhaler INL 1 TO 2 PFS PO Q 4 TO 6 H PRN 11/19/19  Yes Historical Provider, MD   losartan (COZAAR) 100 MG tablet Take 1 tablet by mouth daily 9/26/18  Yes Sabiha Nova DO   simvastatin (ZOCOR) 40 MG tablet Take 40 mg by mouth daily    Yes Historical Provider, MD   Lansoprazole (PREVACID PO) Take 15 mg by mouth daily. Yes Historical Provider, MD   ezetimibe (ZETIA) 10 MG tablet Take 10 mg by mouth daily.    Yes Historical Provider, MD   fenofibrate (TRICOR) 145 MG tablet Take 145 mg by mouth daily  11/16/14  Yes Historical Provider, MD       No Known Allergies    Social History     Socioeconomic History    Marital status:      Spouse name: Not on file    Number of children: Not on file    Years of education: Not on file    Highest education level: Not on file   Occupational History    Not on file   Social Needs    Financial resource strain: Not on file    Food insecurity     Worry: Not on file     Inability: Not on file    Transportation needs     Medical: Not on file     Non-medical: Not on file   Tobacco Use    Smoking status: Former Smoker     Packs/day: 0.00     Years: 25.00     Pack years: 0.00     Last attempt to quit: 2/22/2017     Years since quitting: 3.3    Smokeless tobacco: Never Used   Substance and Sexual Activity    Alcohol use: Yes     Comment: social    Drug use: No    Sexual activity: Not on file   Lifestyle    Physical activity     Days per week: Not on file     Minutes per session: Not on file    Stress: Not on file   Relationships    Social connections     Talks on phone: Not on file     Gets together: Not on file     Attends Church service: Not on file     Active member of club or organization: Not on file     Attends meetings of clubs or organizations: Not on file     Relationship status: Not on file    Intimate partner violence     Fear of current or ex partner: Not on file     Emotionally abused: Not on file     Physically abused: Not on file     Forced sexual activity: Not on file   Other Topics Concern    Not on file   Social History Narrative    ** Merged History Encounter **            Family History   Problem Relation Age of Onset    Heart Disease Father     Heart Disease Maternal Uncle     No Known Problems Mother          REVIEW OF SYSTEMS:    CONSTITUTIONAL:  negative for  fevers, chills, sweats and fatigue    RESPIRATORY:  negative for  dry cough, cough with sputum, dyspnea, wheezing and chest pain    CARDIOVASCULAR:  negative for chest pain, dyspnea, palpitations, syncope    GASTROINTESTINAL:  negative for nausea, vomiting, change in bowel habits, diarrhea, constipation and abdominal pain    MUSCULOSKELETAL: negative for muscle weakness    SKIN: negative for itching or rashes. BEHAVIOR/PSYCH:  negative for poor appetite, increased appetite, decreased sleep and poor concentration    All other systems negative      PHYSICAL EXAM:    VITALS:  BP (!) 161/98   Pulse 94   Temp 98 °F (36.7 °C) (Temporal)   Resp 20   SpO2 98%     CONSTITUTIONAL:  awake, alert, cooperative, no apparent distress, and appears stated age    EYES: PERRLA, EOMI    LUNGS:  No increased work of breathing, no audible wheezing    CARDIOVASCULAR:  regular rate and rhythm    ABDOMEN:  Soft non tender non distended     EXTREMITIES: no signs of clubbing or cyanosis. MUSCULOSKELETAL: negative for flaccid muscle tone or spastic movements. SKIN: gross examination reveals no signs of rashes, or diaphoresis. NEURO: Cranial nerves II-XII grossly intact. No signs of agitated mood.        Assessment/Plan:    Axial low back pain for Left L2,3,4 MBB

## 2020-07-24 ENCOUNTER — OFFICE VISIT (OUTPATIENT)
Dept: PAIN MANAGEMENT | Age: 60
End: 2020-07-24
Payer: COMMERCIAL

## 2020-07-24 VITALS
OXYGEN SATURATION: 96 % | RESPIRATION RATE: 16 BRPM | DIASTOLIC BLOOD PRESSURE: 80 MMHG | TEMPERATURE: 97.6 F | SYSTOLIC BLOOD PRESSURE: 124 MMHG | BODY MASS INDEX: 33.13 KG/M2 | WEIGHT: 250 LBS | HEIGHT: 73 IN | HEART RATE: 94 BPM

## 2020-07-24 PROCEDURE — G8427 DOCREV CUR MEDS BY ELIG CLIN: HCPCS | Performed by: PAIN MEDICINE

## 2020-07-24 PROCEDURE — 99213 OFFICE O/P EST LOW 20 MIN: CPT | Performed by: PAIN MEDICINE

## 2020-07-24 PROCEDURE — G8417 CALC BMI ABV UP PARAM F/U: HCPCS | Performed by: PAIN MEDICINE

## 2020-07-24 PROCEDURE — 1036F TOBACCO NON-USER: CPT | Performed by: PAIN MEDICINE

## 2020-07-24 PROCEDURE — 3017F COLORECTAL CA SCREEN DOC REV: CPT | Performed by: PAIN MEDICINE

## 2020-07-24 NOTE — PROGRESS NOTES
Do you currently have any of the following:    Fever: No  Headache:  No  Cough: No  Shortness of breath: No  Exposed to anyone with these symptoms: No                                                                                                                Collier Risk presents to the [facility] on 7/24/2020. Samson Doratnes is complaining of pain in his lower back. . The pain is intermittent. The pain is described as aching. Pain is rated on his best day at a 6, on his worst day at a 9, and on average at a 8 on the VAS scale. He took his last dose of Motrin last evening. Neli Hernandez does not have issues with constipation. Any procedures since your last visit: Yes, with 60 % relief. He is  on NSAIDS and  is not on anticoagulation medications to include none and is managed by NA. Pacemaker or defibrilator: No Physician managing device is NA.       /80   Pulse 94   Temp 97.6 °F (36.4 °C) (Oral)   Resp 16   Ht 6' 1\" (1.854 m)   Wt 250 lb (113.4 kg)   SpO2 96%   BMI 32.98 kg/m²      No LMP for male patient.

## 2020-08-19 ENCOUNTER — OFFICE VISIT (OUTPATIENT)
Dept: PAIN MANAGEMENT | Age: 60
End: 2020-08-19
Payer: COMMERCIAL

## 2020-08-19 VITALS
OXYGEN SATURATION: 97 % | RESPIRATION RATE: 16 BRPM | TEMPERATURE: 97.1 F | SYSTOLIC BLOOD PRESSURE: 126 MMHG | DIASTOLIC BLOOD PRESSURE: 74 MMHG | HEART RATE: 93 BPM

## 2020-08-19 PROCEDURE — 1036F TOBACCO NON-USER: CPT | Performed by: PAIN MEDICINE

## 2020-08-19 PROCEDURE — 3017F COLORECTAL CA SCREEN DOC REV: CPT | Performed by: PAIN MEDICINE

## 2020-08-19 PROCEDURE — G8427 DOCREV CUR MEDS BY ELIG CLIN: HCPCS | Performed by: PAIN MEDICINE

## 2020-08-19 PROCEDURE — 99213 OFFICE O/P EST LOW 20 MIN: CPT | Performed by: PAIN MEDICINE

## 2020-08-19 PROCEDURE — G8417 CALC BMI ABV UP PARAM F/U: HCPCS | Performed by: PAIN MEDICINE

## 2020-08-19 NOTE — PROGRESS NOTES
[address]  [address] [facility], [address] [place] [address] [place]  [phone]    Tele health follow up Note      Vidhi Echols     Date of Visit:  8/19/2020    CC:  Tele health follow up   Chief Complaint   Patient presents with    Lower Back Pain     HPI:    Follow up in his low back pain with no acute issues. .  Change in quality of symptoms:no. Medication side effects:not applicable . Recent diagnostic testing:KUB  Recent interventional procedures:none.     He has not been on anticoagulation medications to include none. The patient  has not been on herbal supplements. The patient is not diabetic.     Imaging:   Lumbar spine MRI 2019    1.  At L2-3, there is a progressing left paracentral and foraminal disc   osteophyte complex abutting both the exiting L2 nerve and traversing L3   nerve root.  There is progressing moderate central canal stenosis. Luis Gomez   is progressing moderate to severe right foraminal stenosis. 2.  At L3-4, the central canal is well decompressed.  There is a   persistent left foraminal disc protrusion abutting the exiting L3 nerve.    There is stable moderate right foraminal stenosis. 3.  At L4-5, the central canal is well decompressed.  There is a   persistent right foraminal disc osteophyte complex abutting the exiting   L4 nerve.  There is progressing moderate to severe left foraminal   stenosis. 4.  At L5-S1, the central canal is well decompressed.  There is stable   mild bilateral foraminal stenosis. Anatomic variant: None.  L4-5 is considered the level of the iliac crest   and assume there are 5 lumbar-type vertebrae.     Previous treatments: Physical Therapy, Surgery L3-5 laminectomy and medications. Fonnie Divyohan                                        Potential Aberrant Drug-Related Behavior:    None      Urine Drug Screening:  None, no opioids started      OARRS report:  08/2020 consistent     Past Medical History: Reviewed    Past Surgical History: Reviewed     Home Medications: Reviewed    Allergies: Reviewed     Social History: Reviewed     REVIEW OF SYSTEMS:     Brigette Cobb denies fever/chills, chest pain, shortness of breath, new bowel or bladder complaints. All other review of systems was negative. PHYSICAL EXAMINATION:      General:       General appearance:   pleasant and well-hydrated. , in mild to moderate discomfort and A & O x3  Build:Overweight     HEENT:     Head:normocephalic and atraumatic  Pupils:regular, round and equal.  Sclera: icterus absent,      Lungs:     Breathing:Breathing Pattern: regular, no distress     Abdomen:     Shape:obese and non-distended  Tenderness:none     Lumbar spine:     Spine inspection:surgical incision scar   CVA tenderness:No  Palpation:tenderness paravertebral muscles, facet loading, Left, positive and tenderness. Right negative  Range of motion:abnormal moderately Lateral bending, flexion, extension rotation Left and is  painful.     Musculoskeletal:     Trigger points in Paraveteral:absent bilaterally  SI joint tenderness:negative right, negative left              TRAE test:negative right, negative left  Piriformis tenderness:negative right, negative left  Trochanteric bursa tenderness:negative right, negative left  SLR:negative right, negative left, sitting      Extremities:     Tremors:None bilaterally upper and lower  Range of motion: pain with internal rotation of hips negative.   Intact:Yes  Edema:Normal     Neurological:     Sensory:diminished to light touch lateral aspect of right leg  Motor:                              Right Quadriceps5/5          Left Quadriceps5/5           Right Gastrocnemius5/5    Left Gastrocnemius5/5  Right Ant Tibialis5/5  Left Ant Tibialis5/5  Reflexes:    Right Quadriceps reflex2+  Left Quadriceps reflex2+  Right Achilles reflex2+  Left Achilles reflex2+  Gait:normal     Dermatology:     Skin:no unusual rashes and no skin lesions    Impression:    Low back pain with

## 2020-10-27 ENCOUNTER — HOSPITAL ENCOUNTER (OUTPATIENT)
Age: 60
Discharge: HOME | End: 2020-10-29
Payer: COMMERCIAL

## 2020-10-27 LAB
ALBUMIN SERPL-MCNC: 4.4 G/DL (ref 3.5–5.2)
ALP BLD-CCNC: 31 U/L (ref 40–129)
ALT SERPL-CCNC: 51 U/L (ref 0–40)
ANION GAP SERPL CALCULATED.3IONS-SCNC: 14 MMOL/L (ref 7–16)
AST SERPL-CCNC: 38 U/L (ref 0–39)
BILIRUB SERPL-MCNC: 0.4 MG/DL (ref 0–1.2)
BUN BLDV-MCNC: 19 MG/DL (ref 8–23)
CALCIUM SERPL-MCNC: 11 MG/DL (ref 8.6–10.2)
CHLORIDE BLD-SCNC: 100 MMOL/L (ref 98–107)
CO2: 22 MMOL/L (ref 22–29)
CREAT SERPL-MCNC: 0.9 MG/DL (ref 0.7–1.2)
GFR AFRICAN AMERICAN: >60
GFR NON-AFRICAN AMERICAN: >60 ML/MIN/1.73
GLUCOSE BLD-MCNC: 116 MG/DL (ref 74–99)
MAGNESIUM: 1 MG/DL (ref 1.6–2.6)
POTASSIUM SERPL-SCNC: 4 MMOL/L (ref 3.5–5)
SODIUM BLD-SCNC: 136 MMOL/L (ref 132–146)
TOTAL PROTEIN: 7.4 G/DL (ref 6.4–8.3)

## 2020-10-27 PROCEDURE — 80053 COMPREHEN METABOLIC PANEL: CPT

## 2020-10-27 PROCEDURE — 83735 ASSAY OF MAGNESIUM: CPT

## 2020-11-03 PROBLEM — M47.816 LUMBAR SPONDYLOSIS: Status: RESOLVED | Noted: 2019-12-13 | Resolved: 2020-11-03

## 2020-11-11 ENCOUNTER — HOSPITAL ENCOUNTER (EMERGENCY)
Age: 60
Discharge: HOME | End: 2020-11-11
Attending: EMERGENCY MEDICINE
Payer: COMMERCIAL

## 2020-11-11 VITALS
TEMPERATURE: 97.2 F | HEIGHT: 73 IN | DIASTOLIC BLOOD PRESSURE: 98 MMHG | SYSTOLIC BLOOD PRESSURE: 150 MMHG | OXYGEN SATURATION: 99 % | HEART RATE: 99 BPM | BODY MASS INDEX: 32.47 KG/M2 | RESPIRATION RATE: 22 BRPM | WEIGHT: 245 LBS

## 2020-11-11 LAB
ALBUMIN SERPL-MCNC: 4 G/DL (ref 3.5–5.2)
ALP BLD-CCNC: 30 U/L (ref 40–129)
ALT SERPL-CCNC: 15 U/L (ref 0–40)
ANION GAP SERPL CALCULATED.3IONS-SCNC: 11 MMOL/L (ref 7–16)
AST SERPL-CCNC: 16 U/L (ref 0–39)
BASOPHILS ABSOLUTE: 0.05 E9/L (ref 0–0.2)
BASOPHILS RELATIVE PERCENT: 0.7 % (ref 0–2)
BILIRUB SERPL-MCNC: 0.3 MG/DL (ref 0–1.2)
BUN BLDV-MCNC: 13 MG/DL (ref 8–23)
CALCIUM SERPL-MCNC: 9.8 MG/DL (ref 8.6–10.2)
CHLORIDE BLD-SCNC: 104 MMOL/L (ref 98–107)
CO2: 24 MMOL/L (ref 22–29)
CREAT SERPL-MCNC: 0.8 MG/DL (ref 0.7–1.2)
EOSINOPHILS ABSOLUTE: 0.44 E9/L (ref 0.05–0.5)
EOSINOPHILS RELATIVE PERCENT: 6.1 % (ref 0–6)
GFR AFRICAN AMERICAN: >60
GFR NON-AFRICAN AMERICAN: >60 ML/MIN/1.73
GLUCOSE BLD-MCNC: 107 MG/DL (ref 74–99)
HCT VFR BLD CALC: 36.4 % (ref 37–54)
HEMOGLOBIN: 13.1 G/DL (ref 12.5–16.5)
IMMATURE GRANULOCYTES #: 0.02 E9/L
IMMATURE GRANULOCYTES %: 0.3 % (ref 0–5)
LYMPHOCYTES ABSOLUTE: 1.7 E9/L (ref 1.5–4)
LYMPHOCYTES RELATIVE PERCENT: 23.5 % (ref 20–42)
MAGNESIUM: 0.9 MG/DL (ref 1.6–2.6)
MCH RBC QN AUTO: 34.2 PG (ref 26–35)
MCHC RBC AUTO-ENTMCNC: 36 % (ref 32–34.5)
MCV RBC AUTO: 95 FL (ref 80–99.9)
MONOCYTES ABSOLUTE: 0.58 E9/L (ref 0.1–0.95)
MONOCYTES RELATIVE PERCENT: 8 % (ref 2–12)
NEUTROPHILS ABSOLUTE: 4.44 E9/L (ref 1.8–7.3)
NEUTROPHILS RELATIVE PERCENT: 61.4 % (ref 43–80)
PDW BLD-RTO: 11.7 FL (ref 11.5–15)
PLATELET # BLD: 439 E9/L (ref 130–450)
PMV BLD AUTO: 9.3 FL (ref 7–12)
POTASSIUM REFLEX MAGNESIUM: 4.1 MMOL/L (ref 3.5–5)
RBC # BLD: 3.83 E12/L (ref 3.8–5.8)
SODIUM BLD-SCNC: 139 MMOL/L (ref 132–146)
TOTAL PROTEIN: 6.5 G/DL (ref 6.4–8.3)
TROPONIN: <0.01 NG/ML (ref 0–0.03)
WBC # BLD: 7.2 E9/L (ref 4.5–11.5)

## 2020-11-11 PROCEDURE — 80053 COMPREHEN METABOLIC PANEL: CPT

## 2020-11-11 PROCEDURE — 6360000002 HC RX W HCPCS: Performed by: EMERGENCY MEDICINE

## 2020-11-11 PROCEDURE — 84484 ASSAY OF TROPONIN QUANT: CPT

## 2020-11-11 PROCEDURE — 99283 EMERGENCY DEPT VISIT LOW MDM: CPT

## 2020-11-11 PROCEDURE — 96365 THER/PROPH/DIAG IV INF INIT: CPT

## 2020-11-11 PROCEDURE — 83735 ASSAY OF MAGNESIUM: CPT

## 2020-11-11 PROCEDURE — 36415 COLL VENOUS BLD VENIPUNCTURE: CPT

## 2020-11-11 PROCEDURE — 93005 ELECTROCARDIOGRAM TRACING: CPT | Performed by: EMERGENCY MEDICINE

## 2020-11-11 PROCEDURE — 85025 COMPLETE CBC W/AUTO DIFF WBC: CPT

## 2020-11-11 RX ORDER — MECLIZINE HYDROCHLORIDE CHEWABLE TABLETS 25 MG/1
25 TABLET, CHEWABLE ORAL 3 TIMES DAILY PRN
COMMUNITY

## 2020-11-11 RX ORDER — POTASSIUM CHLORIDE 1.5 G/1.77G
20 POWDER, FOR SOLUTION ORAL DAILY
COMMUNITY

## 2020-11-11 RX ORDER — MAGNESIUM SULFATE IN WATER 40 MG/ML
2 INJECTION, SOLUTION INTRAVENOUS ONCE
Status: COMPLETED | OUTPATIENT
Start: 2020-11-11 | End: 2020-11-11

## 2020-11-11 RX ORDER — MAGNESIUM OXIDE 400 MG/1
400 TABLET ORAL
COMMUNITY

## 2020-11-11 RX ADMIN — MAGNESIUM SULFATE HEPTAHYDRATE 2 G: 40 INJECTION, SOLUTION INTRAVENOUS at 09:46

## 2020-11-11 NOTE — ED NOTES
Patient verbalized understanding of discharge instructions and will follow up with PCP as needed or return to ED if symptoms worsen.      Marco A Tsang RN  11/11/20 [id number]

## 2020-11-11 NOTE — ED PROVIDER NOTES
HPI:  11/11/20, Time: 8:21 AM EST Darylene Milks is a 63 y.o. male presenting to the ED for low magnesium, beginning last week. The complaint has been persistent, mild in severity, and worsened by nothing. Patient states that over the last week he has had his magnesium drawn and its been found to be low. He was prescribed magnesium supplements. He has been taking the medication prescribed as directed. Patient had a repeat magnesium drawn on Friday and was told today that his magnesium level continues to be low at 0.8. Dr. Day Montero, nephrology, wanted him to come to the emergency department for an evaluation. Patient states that he has no real complaints. He states that he has been intermittently \"dizzy. \" Patient was admitted at [facility] 2 weeks ago and was worked up for the dizziness. He was told it was likely vertigo. He is currently taking meclizine as needed. He denies any chest pain, shortness of breath, lightheadedness, abdominal pain, nausea, vomiting, diarrhea, constipation, muscle cramps. Patient states that he has a routine colonoscopy scheduled for tomorrow. He is supposed to begin the prep today. ROS:   Pertinent positives and negatives are stated within HPI, all other systems reviewed and are negative.  --------------------------------------------- PAST HISTORY ---------------------------------------------  Past Medical History:  has a past medical history of Acid reflux, GERD (gastroesophageal reflux disease), Hyperlipidemia, Hypertension, Hypokalemia, Hypomagnesemia, and Low back pain. Past Surgical History:  has a past surgical history that includes hernia repair; fracture surgery; Colonoscopy; Carpal tunnel release (Left, 1 24 14); Carpal tunnel release; hernia repair; fracture surgery; Knee arthroscopy (Left, 02/20/15); back surgery; Tonsillectomy; Nerve Block (Right, 01/14/2020); Anesthesia Nerve Block (Right, 1/14/2020);  Nerve Block (Right, 02/20/2020); Anesthesia Nerve Block (Right, 2/20/2020); Nerve Block (Right, 05/04/2020); Pain management procedure (Right, 5/4/2020); Nerve Block (Left, 06/29/2020); and Anesthesia Nerve Block (Left, 6/29/2020). Social History:  reports that he quit smoking about 3 years ago. He smoked 0.00 packs per day for 25.00 years. He has never used smokeless tobacco. He reports current alcohol use. He reports that he does not use drugs. Family History: family history includes Heart Disease in his father and maternal uncle; No Known Problems in his mother. The patients home medications have been reviewed. Allergies: Patient has no known allergies. ---------------------------------------------------PHYSICAL EXAM--------------------------------------    Constitutional/General: Alert and oriented x3, well appearing, non toxic in NAD  Head: Normocephalic and atraumatic  Eyes: PERRL, EOMI  Mouth: Oropharynx clear, handling secretions, no trismus  Neck: Supple, full ROM, non tender to palpation in the midline, no stridor, no crepitus, no meningeal signs  Pulmonary: Lungs clear to auscultation bilaterally, no wheezes, rales, or rhonchi. Not in respiratory distress  Cardiovascular:  Regular rate. Regular rhythm. No murmurs, gallops, or rubs. 2+ distal pulses  Chest: no chest wall tenderness  Abdomen: Soft. Obese. Non tender. Non distended. +BS. No rebound, guarding, or rigidity. No pulsatile masses appreciated. Musculoskeletal: Moves all extremities x 4. Warm and well perfused, no clubbing, cyanosis, or edema. Capillary refill <3 seconds  Skin: warm and dry. No rashes. Neurologic: GCS 15, CN 2-12 grossly intact, no focal deficits, symmetric strength 5/5 in the upper and lower extremities bilaterally  Psych: Normal Affect    -------------------------------------------------- RESULTS -------------------------------------------------  I have personally reviewed all laboratory and imaging results for this patient. Results are listed below.      LABS:  Results for orders placed or performed during the hospital encounter of 11/11/20   CBC Auto Differential   Result Value Ref Range    WBC 7.2 4.5 - 11.5 E9/L    RBC 3.83 3.80 - 5.80 E12/L    Hemoglobin 13.1 12.5 - 16.5 g/dL    Hematocrit 36.4 (L) 37.0 - 54.0 %    MCV 95.0 80.0 - 99.9 fL    MCH 34.2 26.0 - 35.0 pg    MCHC 36.0 (H) 32.0 - 34.5 %    RDW 11.7 11.5 - 15.0 fL    Platelets 848 351 - 749 E9/L    MPV 9.3 7.0 - 12.0 fL    Neutrophils % 61.4 43.0 - 80.0 %    Immature Granulocytes % 0.3 0.0 - 5.0 %    Lymphocytes % 23.5 20.0 - 42.0 %    Monocytes % 8.0 2.0 - 12.0 %    Eosinophils % 6.1 (H) 0.0 - 6.0 %    Basophils % 0.7 0.0 - 2.0 %    Neutrophils Absolute 4.44 1.80 - 7.30 E9/L    Immature Granulocytes # 0.02 E9/L    Lymphocytes Absolute 1.70 1.50 - 4.00 E9/L    Monocytes Absolute 0.58 0.10 - 0.95 E9/L    Eosinophils Absolute 0.44 0.05 - 0.50 E9/L    Basophils Absolute 0.05 0.00 - 0.20 E9/L   Comprehensive Metabolic Panel w/ Reflex to MG   Result Value Ref Range    Sodium 139 132 - 146 mmol/L    Potassium reflex Magnesium 4.1 3.5 - 5.0 mmol/L    Chloride 104 98 - 107 mmol/L    CO2 24 22 - 29 mmol/L    Anion Gap 11 7 - 16 mmol/L    Glucose 107 (H) 74 - 99 mg/dL    BUN 13 8 - 23 mg/dL    CREATININE 0.8 0.7 - 1.2 mg/dL    GFR Non-African American >60 >=60 mL/min/1.73    GFR African American >60     Calcium 9.8 8.6 - 10.2 mg/dL    Total Protein 6.5 6.4 - 8.3 g/dL    Alb 4.0 3.5 - 5.2 g/dL    Total Bilirubin 0.3 0.0 - 1.2 mg/dL    Alkaline Phosphatase 30 (L) 40 - 129 U/L    ALT 15 0 - 40 U/L    AST 16 0 - 39 U/L   Magnesium   Result Value Ref Range    Magnesium 0.9 (LL) 1.6 - 2.6 mg/dL   Troponin   Result Value Ref Range    Troponin <0.01 0.00 - 0.03 ng/mL   EKG 12 Lead   Result Value Ref Range    Ventricular Rate 90 BPM    Atrial Rate 90 BPM    P-R Interval 162 ms    QRS Duration 76 ms    Q-T Interval 324 ms    QTc Calculation (Bazett) 396 ms    P Axis -5 degrees    R Axis 20

## 2020-11-13 LAB
EKG ATRIAL RATE: 90 BPM
EKG P AXIS: -5 DEGREES
EKG P-R INTERVAL: 162 MS
EKG Q-T INTERVAL: 324 MS
EKG QRS DURATION: 76 MS
EKG QTC CALCULATION (BAZETT): 396 MS
EKG R AXIS: 20 DEGREES
EKG T AXIS: 43 DEGREES
EKG VENTRICULAR RATE: 90 BPM

## 2021-03-14 ENCOUNTER — IMMUNIZATION (OUTPATIENT)
Dept: PRIMARY CARE CLINIC | Age: 61
End: 2021-03-14
Payer: COMMERCIAL

## 2021-03-14 PROCEDURE — 0001A PR IMM ADMN SARSCOV2 30MCG/0.3ML DIL RECON 1ST DOSE: CPT | Performed by: EMERGENCY MEDICINE

## 2021-03-14 PROCEDURE — 91300 COVID-19, PFIZER VACCINE 30MCG/0.3ML DOSE: CPT | Performed by: EMERGENCY MEDICINE

## 2021-04-12 ENCOUNTER — IMMUNIZATION (OUTPATIENT)
Dept: PRIMARY CARE CLINIC | Age: 61
End: 2021-04-12
Payer: COMMERCIAL

## 2021-04-12 PROCEDURE — 0002A COVID-19, PFIZER VACCINE 30MCG/0.3ML DOSE: CPT

## 2021-04-12 PROCEDURE — 91300 COVID-19, PFIZER VACCINE 30MCG/0.3ML DOSE: CPT

## 2021-07-22 DIAGNOSIS — G62.9 PERIPHERAL POLYNEUROPATHY: Primary | ICD-10-CM

## 2021-07-23 ENCOUNTER — OFFICE VISIT (OUTPATIENT)
Dept: PHYSICAL MEDICINE AND REHAB | Age: 61
End: 2021-07-23
Payer: COMMERCIAL

## 2021-07-23 VITALS — BODY MASS INDEX: 33.8 KG/M2 | WEIGHT: 255 LBS | HEIGHT: 73 IN

## 2021-07-23 DIAGNOSIS — M54.10 POLYRADICULOPATHY: ICD-10-CM

## 2021-07-23 DIAGNOSIS — G62.9 PERIPHERAL POLYNEUROPATHY: Primary | ICD-10-CM

## 2021-07-23 DIAGNOSIS — G56.23 CUBITAL TUNNEL SYNDROME OF BOTH UPPER EXTREMITIES: ICD-10-CM

## 2021-07-23 PROCEDURE — G8428 CUR MEDS NOT DOCUMENT: HCPCS | Performed by: PHYSICAL MEDICINE & REHABILITATION

## 2021-07-23 PROCEDURE — 95886 MUSC TEST DONE W/N TEST COMP: CPT | Performed by: PHYSICAL MEDICINE & REHABILITATION

## 2021-07-23 PROCEDURE — G8417 CALC BMI ABV UP PARAM F/U: HCPCS | Performed by: PHYSICAL MEDICINE & REHABILITATION

## 2021-07-23 PROCEDURE — 99202 OFFICE O/P NEW SF 15 MIN: CPT | Performed by: PHYSICAL MEDICINE & REHABILITATION

## 2021-07-23 PROCEDURE — 95913 NRV CNDJ TEST 13/> STUDIES: CPT | Performed by: PHYSICAL MEDICINE & REHABILITATION

## 2021-07-23 NOTE — PATIENT INSTRUCTIONS
Electrodiagnotic Laboratory  Acc[facility] the [facility] with Exemplary status  ROMMEL Hammond D.O. [facility]  [address] [address] [place]  Phone: [phone]  Fax: [phone]        Today you had an electrodiagnostic exam which included nerve conduction studies (NCS) and electromyography (EMG). This test evaluated the electrical activity of your nerves and muscles to help determine if you have a nerve or muscle disease. This test can help determine the location and type of a nerve or muscle problem. This will help your referring doctor diagnose your condition and determine the appropriate next step in your treatment plan. After your test:    1. There are no long lasting side effects of the test.     2. You may resume your normal activities without restrictions. 3.  Resume any medications that were stopped for the test.     4  If you have sore areas or bruising in your muscles where the needle was placed, apply a cold pack to the sore area for 15-20 minutes three to four times a day as needed for pain. The soreness should go away in about 1-2 days. 5. Your results were provided  Briefly at the end of your test and the final detailed report will be provided to your referring physician, and/or primary care physician and any other parties you requested within 1-2 days of the examination. You may wish to contact your referring provider after a few days to determine what they would like you to do next. 6.  Please call [phone] with any questions or concerns and if you develop increased body temperature/fever, swelling, tenderness, increased pain and/or drainage from the sites where the needle was placed. Thank you for choosing us for your health care needs.

## 2021-07-23 NOTE — PROGRESS NOTES
[address] [facility]  Electrodiagnostic Laboratory  *Accredited by the [address] [place] [address] with exemplary status  [address] [facility] [place]  Phone: [phone]  Fax: [phone]    Referring Provider: Lexi Coyle DO  Primary Care Physician: Britt Graf DO  Patient Name: Zola Brunner  Patient YOB: 1960  Gender: male  BMI: There is no height or weight on file to calculate BMI. There were no vitals taken for this visit. 7/23/2021    Description of clinical problem:   Chief Complaint   Patient presents with    Extremity Pain     none    Numbness     numbness in entire hand to forearm. left is slightly worse. did have carpal tunnel release on left    Extremity Weakness     decreae  strength     Sensory NCS      Nerve / Sites Rec. Site Peak Lat PP Amp Segments Distance Velocity Temp. ms µV  cm m/s °C   L Median - Digit II (Antidromic)      Palm Dig II 2.08 28.4 Palm - Dig II 7 42 32      Wrist Dig II 3.39 24.3 Wrist - Dig II 14 53 32   L Ulnar - Digit V (Antidromic)      Wrist Dig V 3.91 4.9 Wrist - Dig V 14 46 32   R Ulnar - Digit V (Antidromic)      Wrist Dig V 3.75 10.5 Wrist - Dig V 14 46 32   L Radial - Anatomical snuff box (Forearm)      Forearm Wrist 2.66 4.3 Forearm - Wrist 10 47 32   L Dorsal ulnar cutaneous - Hand dorsum (Forearm)      Forearm Hand dorsum 2.34 5.2 Forearm - Hand dorsum 8 45 32   R Dorsal ulnar cutaneous - Hand dorsum (Forearm)      Forearm Hand dorsum 2.34 6.6 Forearm - Hand dorsum 8 47 32       Combined Sensory Index      Nerve / Sites Rec. Site Peak Lat NP Amp PP Amp Segments Dist. Peak Diff Temp.      ms µV µV  cm ms °C   L Median - CSI      Median Thumb 2.86 8.2 10.7 Median - Radial 10 0.16 32      Radial Thumb 2.71 2.9 3.2 Median - Ulnar 14 -0.21 32      Median Ring 3.33 5.7 8.8 Median palm - Ulnar palm 8 -2.86 23.4      Ulnar Ring 3.54 1.5 2.0          Median palm Wrist 2.19 32.8 54.9          Ulnar palm Wrist 5.05 5.2 8.8 CSI     CSI  2.92*        Motor NCS      Nerve / Sites Muscle Onset Amplitude Segments Distance Velocity Temp.     ms mV  cm m/s °C   L Median - APB      Palm APB 2.14 11.8 Palm - APB   32      Wrist APB 3.75 10.1 Wrist - Palm 8 50 32      Elbow APB 7.66 8.9 Elbow - Wrist 21 54 32   L Ulnar - ADM      Wrist ADM 3.65 8.7 Wrist - ADM 8  32      B. Elbow ADM 7.76 8.1 B. Elbow - Wrist 22 53 32      A. Elbow ADM 10.31 7.1 A. Elbow - B. Elbow 10 39* 32   R Ulnar - ADM      Wrist ADM 3.39 8.0 Wrist - ADM 8  32      B. Elbow ADM 7.24 6.4 B. Elbow - Wrist 23 60 32      A. Elbow ADM 9.90 5.9 A. Elbow - B. Elbow 10 38* 32       F  Wave      Nerve Fmin % F    ms %   L Median - APB 30.63 80   L Ulnar - ADM 36.61* 100   R Ulnar - ADM 36.88* 90       EMG      EMG Summary Table     Spontaneous MUAP Recruitment   Muscle Nerve Roots IA Fib PSW Fasc Amp Dur. PPP Pattern   L. Biceps brachii Musculocutaneous C5-C6 N None None None 1+ 1+ 1+ Reduced   L. Triceps brachii Radial C6-C8 N 2+ 2+ None N N N Reduced   L. Pronator teres Median C6-C7 N None None None N N N N   L. Flexor digitorum profundus, dig 4 & 5 Ulnar C8-T1 N None None None N N N Reduced   L. First dorsal interosseous Ulnar C8-T1 N 2+ 1+ None N N N N   L. Abductor pollicis brevis Median I7-R1 N None None None N N N Reduced   L. Cervical paraspinals (low)  - N None None None N N N N   L. Cervical paraspinals (mid)  - N None None None N N N N   R. Cervical paraspinals (low)  - N None None None N N N N   R. Cervical paraspinals (mid)  - N None None None N N N N   R. Biceps brachii Musculocutaneous C5-C6 N None None None N N N Reduced   R. Triceps brachii Radial C6-C8 N None None None 1+ 1+ 1+ Reduced   R. Pronator teres Median C6-C7 N 2+ 2+ None N N N Reduced   R. Flexor digitorum profundus, dig 4 & 5 Ulnar C8-T1 N None None None N N N Reduced   R. First dorsal interosseous Ulnar C8-T1 N None None None N N N N   R.  Abductor pollicis brevis Median W6-Y9 N None None None N N N N Study Limitations:  obesity    Summary of Findings:   Nerve conduction studies:   · The following nerve conduction studies were abnormal:   · The left combined sensory index study was abnormal but with ulnar studies being prolonged compared to median. Bilateral ulnar motor studies recording the first dorsal interossei were focally slow in conduction velocity across the wrist. The bilateral ulnar minimal F wave was prolonged. · All other nerve conduction studies listed in the table above were normal in latency, amplitude and conduction velocity. Needle EMG:   · Needle EMG was performed using a concentric needle. The following abnormalities were seen on needle EMG: Reduced recruitment of motor units with chronic changes of increased amplitude, duration and phases were seen in the left biceps and right triceps. Reduced motor unit recruitment was also present in the left triceps, flexor digitorum profundus to the fourth digit, abductor pollicis brevis , right pronator and flexor digitorum profundus to the fourth digit. Positive sharp waves and fibrillation potentials were present in the left triceps, left first dorsal interossei  ·  and right  pronator teres.  All other muscles tested, as listed in the table above demonstrated normal amplitude, duration, phases and recruitment and no active denervation signs were seen. Diagnostic Interpretation: This study was complexly abnormal.     Electrodiagnosis: There is electrodiagnostic evidence of a ulnar neuropathy. · Location: bilateral at the elbow. · Nature: [  ] Axonal   [  ] Demyelinating  [ X ] Mixed axonal and demyelinating     [  ] Sensory [  ] Motor               [ X ] Mixed sensorimotor     [ X ] with active denervation on left       [  ] without active denervation on right  · Duration: Subacute  · Severity: moderate  · Prognosis: Good. The prognosis for recovery of demyelinating lesions is good if the cause is alleviated. Electrodiagnosis: There is electrodiagnostic evidence of a cervical radiculopathy. · Location: Right C6, left C8   · Nature: [ X ] Axonal   [  ] Demyelinating  [  ] Mixed axonal and demyelinating     [  ] Sensory [  X] Motor               [  ] Mixed sensorimotor     [ X ] with active denervation       [  ] without active denervation  · Duration: Chronic  · Severity: moderate  · Prognosis: Poor. The prognosis for recovery of axonal lesions is poor and dependant on collateral sprouting and reinnervation. Previous Study: Not provided for comparison    Follow up EMG is recommended if symptoms persist and no surgical intervention in one year. Technologist: Shelly Lyn  Physician:    Yeyo Gauthier D.O., P.T. Board Certified Physical Medicine and Rehabilitation  Board Certified Electrodiagnostic Medicine      Nerve conduction studies and electromyography were performed according to our laboratory policies and procedures which can be provided upon request. All abnormal values are identified in the table.  Laboratory normal values can also be provided upon request.       Cc: DO José Miguel Solis DO

## 2021-07-29 NOTE — PROGRESS NOTES
[address] [facility]  Electrodiagnostic Laboratory  *Accredited by the [address] [place] [address] with exemplary status  [address] [facility] [place]  Phone: [phone]  Fax: [phone]      Date of Examination: 07/29/21  Patient Name: Hiwot Duarte  is a 57y.o. year old male who was seen due to complaints of   Chief Complaint   Patient presents with    Extremity Pain     none    Numbness     numbness in entire hand to forearm. left is slightly worse. did have carpal tunnel release on left    Extremity Weakness     decreae  strength       Physical Exam: MSK: There is no joint effusion, deformity, instability, swelling, erythema or warmth. AROM is full in the spine and extremities. +Tinel bilateral elbow. +Spurling bilaterally. Neurologic:  No focal sensorimotor deficit. Reflexes 2+ and symmetric. Gait is normal.    Impression:   1. Peripheral polyneuropathy    2. Cubital tunnel syndrome of both upper extremities    3. Polyradiculopathy        Plan:   · EMG is indicated to evaluate the above diagnosis. Orders Placed This Encounter   Procedures    ND NEEDLE EMG EA EXTREMTY W/PARASPINL AREA COMPLETE    ND MOTOR &/SENS 13/> NRV CNDJ PRECONF ELTRODE LIMB     · EMG was done today and showed bilateral cubital tunnel syndrome, Polyradiculopathy. The patient was educated about the diagnosis and the prognosis. · Recommend cervical MRI. Patient educated to avoid leaning on elbow and to avoid repetitive elbow flexion/extension. · Elbow splint at h.s.    · Repeat EMG one year if symptoms persist and no surgical intervention  · Advised patient to follow up with referring provider. Thank you for allowing me to participate in the care of your patient.       Sincerely,     Kimberley Byrd, DO

## 2021-07-30 DIAGNOSIS — G62.9 PERIPHERAL POLYNEUROPATHY: ICD-10-CM

## 2023-01-17 DIAGNOSIS — M25.511 RIGHT SHOULDER PAIN, UNSPECIFIED CHRONICITY: Primary | ICD-10-CM

## 2023-01-17 SDOH — HEALTH STABILITY: PHYSICAL HEALTH: ON AVERAGE, HOW MANY MINUTES DO YOU ENGAGE IN EXERCISE AT THIS LEVEL?: PATIENT DECLINED

## 2023-01-17 SDOH — HEALTH STABILITY: PHYSICAL HEALTH: ON AVERAGE, HOW MANY DAYS PER WEEK DO YOU ENGAGE IN MODERATE TO STRENUOUS EXERCISE (LIKE A BRISK WALK)?: 0 DAYS

## 2023-01-18 DIAGNOSIS — M25.512 ACUTE PAIN OF LEFT SHOULDER: ICD-10-CM

## 2023-01-18 DIAGNOSIS — M25.511 RIGHT SHOULDER PAIN, UNSPECIFIED CHRONICITY: Primary | ICD-10-CM

## 2023-01-19 ENCOUNTER — OFFICE VISIT (OUTPATIENT)
Dept: ORTHOPEDIC SURGERY | Age: 63
End: 2023-01-19
Payer: COMMERCIAL

## 2023-01-19 VITALS — BODY MASS INDEX: 29.82 KG/M2 | HEIGHT: 73 IN | WEIGHT: 225 LBS | TEMPERATURE: 98 F

## 2023-01-19 DIAGNOSIS — S46.012A TRAUMATIC COMPLETE TEAR OF LEFT ROTATOR CUFF, INITIAL ENCOUNTER: Primary | ICD-10-CM

## 2023-01-19 DIAGNOSIS — G54.0 AXILLARY NERVE PALSY: ICD-10-CM

## 2023-01-19 PROCEDURE — 3017F COLORECTAL CA SCREEN DOC REV: CPT | Performed by: ORTHOPAEDIC SURGERY

## 2023-01-19 PROCEDURE — G8427 DOCREV CUR MEDS BY ELIG CLIN: HCPCS | Performed by: ORTHOPAEDIC SURGERY

## 2023-01-19 PROCEDURE — 99203 OFFICE O/P NEW LOW 30 MIN: CPT | Performed by: ORTHOPAEDIC SURGERY

## 2023-01-19 PROCEDURE — 4004F PT TOBACCO SCREEN RCVD TLK: CPT | Performed by: ORTHOPAEDIC SURGERY

## 2023-01-19 PROCEDURE — G8419 CALC BMI OUT NRM PARAM NOF/U: HCPCS | Performed by: ORTHOPAEDIC SURGERY

## 2023-01-19 PROCEDURE — G8484 FLU IMMUNIZE NO ADMIN: HCPCS | Performed by: ORTHOPAEDIC SURGERY

## 2023-01-19 NOTE — PROGRESS NOTES
Chief Complaint   Patient presents with    Shoulder Pain     Left shoulder pain cannot lift it had cervical fusion a year ago but was fine before and then after surgery he cannot lift and has more pain. Nick Valdovinos is a 60y.o. year old   male who is seen today  for evaluation of left shoulder pain. He reports the pain has been ongoing for the past year following cervical surgery. The patient states heh ad no issues until after a cervical fusion that was performed in November 2021. He reports the pain is worse with activity, better with rest.  The patient does have mechanical symptoms. Hedoes have night pain. He denies a feeling of instability. The prior treatments have been none. The patient   has not responded to the treatment. The patient is right hand dominant. The patient is not working. .. Chief Complaint   Patient presents with    Shoulder Pain     Left shoulder pain cannot lift it had cervical fusion a year ago but was fine before and then after surgery he cannot lift and has more pain. Past Medical History:   Diagnosis Date    Acid reflux     GERD (gastroesophageal reflux disease)     Hyperlipidemia     Hypertension     Hypokalemia     Hypomagnesemia     Low back pain      Past Surgical History:   Procedure Laterality Date    ANESTHESIA NERVE BLOCK Right 1/14/2020    RIGHT L2,3,4 MEDIAL BRANCH BLOCK WITHOUT SEDATION (CPT 47849,86945) performed by Cirilo Oconnor MD at [facility] Right 2/20/2020    RIGHT L2,3,4 MEDIAL BRANCH BLOCK (CPT 72697,50730) performed by Cirilo Oconnor MD at [facility] Left 6/29/2020    LEFT L2 L3 L4 MEDIAL BRANCH BLOCK (CPT 99750) ([facility] AFTERNOON APPOINTMENT) performed by Cirilo Oconnor MD at [address] [facility]      May 4, 2018 decompression with Dr. Tom Ruiz.      CARPAL TUNNEL RELEASE Left 1 24 14    CARPAL TUNNEL RELEASE      left    COLONOSCOPY      FRACTURE SURGERY      arm FRACTURE SURGERY      left arm    HERNIA REPAIR      HERNIA REPAIR      x2 child    KNEE ARTHROSCOPY Left 02/20/15    NERVE BLOCK Right 2020    NERVE BLOCK Right 2020    L2,3,4 medial     NERVE BLOCK Right 2020    L2,3,4,5 medial radiofrequency     NERVE BLOCK Left 2020    medial branch block    PAIN MANAGEMENT PROCEDURE Right 2020    RIGHT L2, 3, 4 MEDIAL BRANCH L5 DORSALRAMUS RADIOFREQUENCY ABLATION performed by Felix Lagunas MD at [facility]         Current Outpatient Medications:     potassium chloride (KLOR-CON) 20 MEQ packet, Take 20 mEq by mouth daily, Disp: , Rfl:     magnesium oxide (MAG-OX) 400 MG tablet, Take 400 mg by mouth 3 x day per Dr. Tran Ramsey, Disp: , Rfl:     ibuprofen (ADVIL;MOTRIN) 800 MG tablet, Take 800 mg by mouth every 8 hours as needed for Pain, Disp: , Rfl:     losartan (COZAAR) 100 MG tablet, Take 1 tablet by mouth daily, Disp: 30 tablet, Rfl: 3    Lansoprazole (PREVACID PO), Take 15 mg by mouth daily. , Disp: , Rfl:     ezetimibe (ZETIA) 10 MG tablet, Take 10 mg by mouth daily. , Disp: , Rfl:     fenofibrate (TRICOR) 145 MG tablet, Take 145 mg by mouth daily , Disp: , Rfl: 2    meclizine (ANTIVERT) 25 MG CHEW, Take 25 mg by mouth 3 times daily as needed, Disp: , Rfl:     VENTOLIN  (90 Base) MCG/ACT inhaler, INL 1 TO 2 PFS PO Q 4 TO 6 H PRN, Disp: , Rfl: 0    simvastatin (ZOCOR) 40 MG tablet, Take 40 mg by mouth daily , Disp: , Rfl:   No Known Allergies  Social History     Socioeconomic History    Marital status:      Spouse name: Not on file    Number of children: Not on file    Years of education: Not on file    Highest education level: Not on file   Occupational History    Not on file   Tobacco Use    Smoking status: Former     Packs/day: 0.00     Years: 25.00     Pack years: 0.00     Types: Cigarettes     Quit date: 2017     Years since quittin.9    Smokeless tobacco: Never   Vaping Use    Vaping Use: Never used Substance and Sexual Activity    Alcohol use: Yes     Comment: social    Drug use: No    Sexual activity: Not on file   Other Topics Concern    Not on file   Social History Narrative    ** Merged History Encounter **          Social Determinants of Health     Financial Resource Strain: Not on file   Food Insecurity: Not on file   Transportation Needs: Not on file   Physical Activity: Unknown    Days of Exercise per Week: 0 days    Minutes of Exercise per Session: Patient refused   Stress: Not on file   Social Connections: Not on file   Intimate Partner Violence: Not At Risk    Fear of Current or Ex-Partner: No    Emotionally Abused: No    Physically Abused: No    Sexually Abused: No   Housing Stability: Not on file     Family History   Problem Relation Age of Onset    Heart Disease Father     Heart Disease Maternal Uncle     No Known Problems Mother        REVIEW OF SYSTEMS:     General/Constitution:  (-)weight loss, (-)fever, (-)chills, (-)weakness. Skin: (-) rash,(-) psoriasis,(-) eczema, (-)skin cancer. Musculoskeletal: (-) fractures,  (-) dislocations,(-) collagen vascular disease, (-) fibromyalgia, (-) multiple sclerosis, (-) muscular dystrophy, (-) RSD,(-) joint pain (-)swelling, (-) joint pain,swelling. Neurologic: (-) epilepsy, (-)seizures,(-) brain tumor,(-) TIA, (-)stroke, (-)headaches, (-)Parkinson disease,(-) memory loss, (-) LOC. Cardiovascular: (-) Chest pain, (-) swelling in legs/feet, (-) SOB, (-) cramping in legs/feet with walking. Respiratory: (-) SOB, (-) Coughing, (-) night sweats. GI: (-) nausea, (-) vomiting, (-) diarrhea, (-) blood in stool, (-) gastric ulcer. Psychiatric: (-) Depression, (-) Anxiety, (-) bipolar disease, (-) Alzheimer's Disease  Allergic/Immunologic: (-) allergies latex, (-) allergies metal, (-) skin sensitivity.   Hematlogic: (-) anemia, (-) blood transfusion, (-) DVT/PE, (-) Clotting disorders      Subjective:    Constitution:  Temp 98 °F (36.7 °C)   Ht 6' 1\" (1.854 m)   Wt 225 lb (102.1 kg)   BMI 29.69 kg/m²     Psycihatric:  The patient is alert and oriented x 3, appears to be stated age and in no distress. Respiratory:  Respiratory effort is not labored. Patient is not gasping. Palpation of the chest reveals no tactile fremitus. Skin:  Upon inspection: the skin appears warm, dry and intact. There is  a previous scar over the affected area. There is not any cellulitis, lymphedema or cutaneous lesions noted in the lower extremities. Upon palpation there is no induration noted. Neurologic:  Motor exam of the upper extremities show: The reflexes in biceps/triceps/brachioradialis are equal and symmetric. Sensory exam C5-T1 are normal bilaterally. Cardiovascular: The vascular exam is normal and is well perfused to distal extremities. There are 2+ radial pulses bilaterally, and motor and sensation is intact to median, ulnar, and radial, musclocutaneus, and axillary nerve distribution and grossly symmetric bilaterally. There is cap refill noted less than two seconds in all digits. There is not edema of the bilateral upper extremities. There is not varicosities noted in the distal extremities. Lymph:  Upon palpation,  there is no lymphadenopathy noted in bilateral upper extremities. Musculoskeletal:  Gait: normal; examination of the nails and digits reveal no cyanosis or clubbing. Cervical Exam:  On physical exam, Bernardino Mon is well-developed, well-nourished, oriented to person, place and time. his gait is normal.  On evaluation of hiscervical spine, He has full range of motion of the cervical spine without pain. There is no cervical tenderness to palpation. Shoulder Exam:  On evaluation of his bilaterally upper extremities, his left shoulder has no deformity. There is tenderness upon palpation of the lateral and anterior shoulder. There is not evidence of scapular dyskinesis.   There is not muscle atrophy in shoulder girdle. The range of motion for the Right Shoulder is 160/45/T8 and for the Left shoulder is 90/40/L2. Right shoulder Motor strength is 5/5 in the supraspinatus, 5/5 internal rotation and 5/5 in external rotation, and Left shoulder motor strength 3+/5 in supraspinatus, 5-/5 in internal rotation, 5-/5 in external rotation. Axillary nerve partial paresthesia. Right shoulder:  negative Impingement , negative Roper ,negative  Speeds,negative  Apprehension ,negative Moran Load Shift, negative Ramin manuver, negative Cross arm test.     Left shoulder:  positive Impingement , positive Roper ,positive  Speeds,negative  Apprehension ,negative Moran Load Shift, negative Ramin manuver, negative Cross arm test.     XRAY:    1. There is no fracture dislocation of the left shoulder   2. Mild degenerative changes of the glenohumeral joint. MRI:    Not performed    Radiographic findings reviewed with patient    Impression:   Encounter Diagnoses   Name Primary? Traumatic complete tear of left rotator cuff, initial encounter Yes    Axillary nerve palsy        Plan: Natural history and expected course discussed. Questions answered. Educational material distributed. Based on my exam, I feel he has a tear in his rotator cuff tendon. I will order an MRI and see him back with the result. I will also order an EMG to evaluate for axillary nerve palsy. At least 30 minutes was spent discussing the diagnosis and treatment options with the patient with at least 50% of the time was spent with decision making and counseling the patient.

## 2023-01-20 ENCOUNTER — TELEPHONE (OUTPATIENT)
Dept: ORTHOPEDIC SURGERY | Age: 63
End: 2023-01-20

## 2023-01-20 DIAGNOSIS — F40.240 CLAUSTROPHOBIA: Primary | ICD-10-CM

## 2023-01-20 RX ORDER — DIAZEPAM 5 MG/1
5 TABLET ORAL PRN
Qty: 1 TABLET | Refills: 0 | Status: SHIPPED | OUTPATIENT
Start: 2023-01-20 | End: 2023-02-03

## 2023-01-20 NOTE — TELEPHONE ENCOUNTER
Patient is pending an MRI on 01/26/2023 and is claustrophobic. He is requesting something to help calm him down. Please advise      . Last appointment 1/19/2023  Next appointment   Future Appointments   Date Time Provider Beatriz Saldañai   1/26/2023  4:45 PM [address] [facility]      Last refill  DOS:       Patient called in requesting refill of:    [facility]icencio[facility] [address] [facility] - F [phone]

## 2023-01-24 ENCOUNTER — PROCEDURE VISIT (OUTPATIENT)
Dept: PHYSICAL MEDICINE AND REHAB | Age: 63
End: 2023-01-24

## 2023-01-24 VITALS — HEIGHT: 73 IN | WEIGHT: 230 LBS | BODY MASS INDEX: 30.48 KG/M2

## 2023-01-24 DIAGNOSIS — G54.0 AXILLARY NERVE PALSY: ICD-10-CM

## 2023-01-24 NOTE — PROGRESS NOTES
[address] [facility]  Electrodiagnostic Laboratory  *Accredited by the [address] [place] [address] with exemplary status  [address] [facility] [address]  Phone: [phone]  Fax: [phone]    Referring Provider: Anam Rivera DO  Primary Care Physician: Silver Sanabria DO  Patient Name: Ronal Jansen  Patient YOB: 1960  Gender: male  BMI: Body mass index is 30.34 kg/m². Height 6' 1\" (1.854 m), weight 230 lb (104.3 kg). 1/24/2023    Reason for Referral: Axillary nerve palsy    Description of clinical problem:   Chief Complaint   Patient presents with    Extremity Pain     Pain in the left shoulder. Cervical fusion about a year ago. Numbness     No feeling from the left elbow into the hand. Extremity Weakness     Decrease ROM       Sensory NCS      Nerve / Sites Rec. Site Peak Lat PP Amp Segments Distance Velocity Temp. ms µV  cm m/s °C   L Median - Digit II (Antidromic)      Palm Dig II 2.03 18.8 Palm - Dig II 7 61 32.4      Wrist Dig II 3.39 19.4 Wrist - Dig II 14 54 32.4   L Ulnar - Digit V (Antidromic)      Wrist Dig V 3.49 11.0 Wrist - Dig V 14 49 32.4   L Radial - Anatomical snuff box (Forearm)      Forearm Wrist 2.40 9.5 Forearm - Wrist 10 55 32.3   L Dorsal ulnar cutaneous - Hand dorsum (Forearm)      Forearm Hand dorsum NR* NR Forearm - Hand dorsum 8 NR 32.3       Motor NCS      Nerve / Sites Muscle Onset Amplitude Segments Distance Velocity Temp.     ms mV  cm m/s °C   L Axillary - Deltoid (1)      Erb's Pt Deltoid 5.73 2.2* Erb's Pt - Deltoid   32.3   R Axillary - Deltoid (1)      Erb's Pt Deltoid 5.52 3.5* Erb's Pt - Deltoid   32.3   L Median - APB      Palm APB 1.98 9.7 Palm - APB   32.3      Wrist APB 3.39 8.3 Wrist - Palm 8 57 32.3      Elbow APB 7.66 8.0 Elbow - Wrist 22 52 32.3   L Ulnar - ADM      Wrist ADM 3.85 8.5 Wrist - ADM 8  32.3      B. Elbow ADM 7.40 7.2 B. Elbow - Wrist 20 56 32.3      A. Elbow ADM 10.42 6.3 A. Elbow - B. Elbow 10 33* 32.3       F Wave      Nerve Fmin % F    ms %   L Median - APB 29.58 70   L Ulnar - ADM 31.41 50       EMG      EMG Summary Table     Spontaneous MUAP Recruitment   Muscle Nerve Roots IA Fib PSW Fasc Amp Dur. PPP Pattern   L. Deltoid Axillary C5-C6 N 1+ (small) None None N N 1+ Reduced   L. Biceps brachii Musculocutaneous C5-C6 N 2+ (small) 2+ None N N N Reduced   L. Triceps brachii Radial C6-C8 N 1+(Small) None None N N N Discrete   L. Flexor digitorum profundus, dig 4 & 5 Ulnar C8-T1 N 1+ None None N N N Reduced   L. Pronator teres Median C6-C7 N None None None N N N N   L. First dorsal interosseous Ulnar C8-T1 N 1+ 1+ None N N N Reduced   L. Abductor pollicis brevis Median L7-Z0 N None None None N N N N   L. Cervical paraspinals (low)  - N None None None N N N N   L. Cervical paraspinals (mid)  - N 2+ 2+ None N N N N        Study Limitations:  none    Summary of Findings:   Nerve conduction studies: The following nerve conduction studies were abnormal:   The left dorsal ulnar cutaneous sensory response was absent. The left ulnar motor study was focally slow across the elbow. Bilateral axillary motor studies were decreased in amplitude but there was no significant side to side difference in amplitude between the symptomatic and asymptomatic side. All other nerve conduction studies, as listed in the table were normal in latency, amplitude and conduction velocity. Needle EMG:   Needle EMG was performed using a concentric needle. The following abnormalities were seen on needle EMG: positive sharp waves, fibrillation potentials and decreased motor unit recruitment were present in the left C6 myotome and in ulnar innervated muscles. Otherwise, observed motor units were normal in amplitude, duration, phases and recruitment and no active denervation signs were seen. Diagnostic Interpretation:    This study was complexly abnormal.     Electrodiagnosis: There is electrodiagnostic evidence of a cervical radiculopathy. Location: left C6. Nature: [ X ] Axonal   [  ] Demyelinating  [  ] Mixed axonal and demyelinating     [  ] Sensory [ X ] Motor               [  ] Mixed sensorimotor     [ X ] with active denervation       [  ] without active denervation  Duration: Chronic  Severity: moderate  Prognosis: Poor    Electrodiagnosis: There is electrodiagnostic evidence of an ulnar neuropathy. Location: left at the elbow. Nature: [  ] Axonal   [  ] Demyelinating  [ X ] Mixed axonal and demyelinating     [  ] Sensory [  ] Motor               Gandalf.Pott  ] Mixed sensorimotor     [ X ] with active denervation       [  ] without active denervation  Duration: Subacute  Severity: severe  Prognosis: Fair. The prognosis for recovery of demyelinating lesions is good if the cause is alleviated. The prognosis for recovery of axonal lesions is poor and dependant on collateral sprouting and reinnervation. Previous Study: There is a prior study for comparison. Date: 2021. Provider: Dr. Ana Laura Dee. Compared to prior study, today's examination shows no further evidence of C8 radiculopathy. Continue to see ulnar neuropathy at the elbow with some progression of sensory abnormality including the loss of the left dorsal ulnar cutaneous response which was previously present. The right C6 radiculopathy which now appears more chronic in nature but is of similar severity. Follow up EMG is recommended if clinically warranted. Technologist: Yaniv Block  Physician:    Jeromy Villegas D.O., P.T. Board Certified Physical Medicine and Rehabilitation  Board Certified Electrodiagnostic Medicine      Nerve conduction studies and electromyography were performed according to our laboratory policies and procedures which can be provided upon request. All abnormal values are identified in the table.  Laboratory normal values can also be provided upon request.       Cc: Mason Yan DO

## 2023-02-06 ENCOUNTER — OFFICE VISIT (OUTPATIENT)
Dept: ORTHOPEDIC SURGERY | Age: 63
End: 2023-02-06
Payer: COMMERCIAL

## 2023-02-06 VITALS — WEIGHT: 230 LBS | TEMPERATURE: 98 F | HEIGHT: 73 IN | BODY MASS INDEX: 30.48 KG/M2

## 2023-02-06 DIAGNOSIS — M75.42 SHOULDER IMPINGEMENT, LEFT: Primary | ICD-10-CM

## 2023-02-06 DIAGNOSIS — S46.219A BICEPS TENDON TEAR: ICD-10-CM

## 2023-02-06 PROCEDURE — G8427 DOCREV CUR MEDS BY ELIG CLIN: HCPCS | Performed by: ORTHOPAEDIC SURGERY

## 2023-02-06 PROCEDURE — 3017F COLORECTAL CA SCREEN DOC REV: CPT | Performed by: ORTHOPAEDIC SURGERY

## 2023-02-06 PROCEDURE — G8484 FLU IMMUNIZE NO ADMIN: HCPCS | Performed by: ORTHOPAEDIC SURGERY

## 2023-02-06 PROCEDURE — G8417 CALC BMI ABV UP PARAM F/U: HCPCS | Performed by: ORTHOPAEDIC SURGERY

## 2023-02-06 PROCEDURE — 20610 DRAIN/INJ JOINT/BURSA W/O US: CPT | Performed by: ORTHOPAEDIC SURGERY

## 2023-02-06 PROCEDURE — 99214 OFFICE O/P EST MOD 30 MIN: CPT | Performed by: ORTHOPAEDIC SURGERY

## 2023-02-06 PROCEDURE — 1036F TOBACCO NON-USER: CPT | Performed by: ORTHOPAEDIC SURGERY

## 2023-02-06 RX ORDER — TRIAMCINOLONE ACETONIDE 40 MG/ML
40 INJECTION, SUSPENSION INTRA-ARTICULAR; INTRAMUSCULAR ONCE
Status: COMPLETED | OUTPATIENT
Start: 2023-02-06 | End: 2023-02-06

## 2023-02-06 RX ADMIN — TRIAMCINOLONE ACETONIDE 40 MG: 40 INJECTION, SUSPENSION INTRA-ARTICULAR; INTRAMUSCULAR at 12:21

## 2023-02-06 NOTE — PROGRESS NOTES
Chief Complaint   Patient presents with    Shoulder Injury     Left shoulder MRI and L UE follow up. Shoulder continues to be painful and giving him problems. Nidia Barnes is a 58y.o. year old   male who is seen today  for evaluation of left shoulder pain. He reports the pain has been ongoing for the past year following cervical surgery. The patient states heh ad no issues until after a cervical fusion that was performed in November 2021. He reports the pain is worse with activity, better with rest.  The patient does have mechanical symptoms. Hedoes have night pain. He denies a feeling of instability. The prior treatments have been none. The patient   has not responded to the treatment. The patient is right hand dominant. The patient is not working. .. Chief Complaint   Patient presents with    Shoulder Injury     Left shoulder MRI and L UE follow up. Shoulder continues to be painful and giving him problems. Past Medical History:   Diagnosis Date    Acid reflux     GERD (gastroesophageal reflux disease)     Hyperlipidemia     Hypertension     Hypokalemia     Hypomagnesemia     Low back pain      Past Surgical History:   Procedure Laterality Date    ANESTHESIA NERVE BLOCK Right 1/14/2020    RIGHT L2,3,4 MEDIAL BRANCH BLOCK WITHOUT SEDATION (CPT 34779,43554) performed by Sofiya North MD at [facility] Right 2/20/2020    RIGHT L2,3,4 MEDIAL BRANCH BLOCK (CPT 63305,69573) performed by Sofiya North MD at [facility] Left 6/29/2020    LEFT L2 L3 L4 MEDIAL BRANCH BLOCK (CPT 47104) (WANTS AFTERNOON APPOINTMENT) performed by Sofiya North MD at [address] [facility]      May 4, 2018 decompression with Dr. Vanessa Sierra.      CARPAL TUNNEL RELEASE Left 1 24 14    CARPAL TUNNEL RELEASE      left    COLONOSCOPY      FRACTURE SURGERY      arm    FRACTURE SURGERY      left arm    HERNIA REPAIR      HERNIA REPAIR      x2 child KNEE ARTHROSCOPY Left 02/20/15    NERVE BLOCK Right 2020    NERVE BLOCK Right 2020    L2,3,4 medial     NERVE BLOCK Right 2020    L2,3,4,5 medial radiofrequency     NERVE BLOCK Left 2020    medial branch block    PAIN MANAGEMENT PROCEDURE Right 2020    RIGHT L2, 3, 4 MEDIAL BRANCH L5 DORSALRAMUS RADIOFREQUENCY ABLATION performed by Tyshawn Osman MD at [place] [address]         Current Outpatient Medications:     potassium chloride (KLOR-CON) 20 MEQ packet, Take 20 mEq by mouth daily, Disp: , Rfl:     magnesium oxide (MAG-OX) 400 MG tablet, Take 400 mg by mouth 3 x day per Dr. Cheryl Alva, Disp: , Rfl:     ibuprofen (ADVIL;MOTRIN) 800 MG tablet, Take 800 mg by mouth every 8 hours as needed for Pain, Disp: , Rfl:     VENTOLIN  (90 Base) MCG/ACT inhaler, INL 1 TO 2 PFS PO Q 4 TO 6 H PRN, Disp: , Rfl: 0    losartan (COZAAR) 100 MG tablet, Take 1 tablet by mouth daily, Disp: 30 tablet, Rfl: 3    Lansoprazole (PREVACID PO), Take 15 mg by mouth daily. , Disp: , Rfl:     ezetimibe (ZETIA) 10 MG tablet, Take 10 mg by mouth daily. , Disp: , Rfl:     fenofibrate (TRICOR) 145 MG tablet, Take 145 mg by mouth daily , Disp: , Rfl: 2  No Known Allergies  Social History     Socioeconomic History    Marital status:      Spouse name: Not on file    Number of children: Not on file    Years of education: Not on file    Highest education level: Not on file   Occupational History    Not on file   Tobacco Use    Smoking status: Former     Packs/day: 0.00     Years: 25.00     Pack years: 0.00     Types: Cigarettes     Quit date: 2017     Years since quittin.9    Smokeless tobacco: Never   Vaping Use    Vaping Use: Never used   Substance and Sexual Activity    Alcohol use: Yes     Comment: social    Drug use: No    Sexual activity: Not on file   Other Topics Concern    Not on file   Social History Narrative    ** Merged History Encounter **          Social Determinants of Health Financial Resource Strain: Not on file   Food Insecurity: Not on file   Transportation Needs: Not on file   Physical Activity: Unknown    Days of Exercise per Week: 0 days    Minutes of Exercise per Session: Patient refused   Stress: Not on file   Social Connections: Not on file   Intimate Partner Violence: Not At Risk    Fear of Current or Ex-Partner: No    Emotionally Abused: No    Physically Abused: No    Sexually Abused: No   Housing Stability: Not on file     Family History   Problem Relation Age of Onset    Heart Disease Father     Heart Disease Maternal Uncle     No Known Problems Mother        REVIEW OF SYSTEMS:     General/Constitution:  (-)weight loss, (-)fever, (-)chills, (-)weakness. Skin: (-) rash,(-) psoriasis,(-) eczema, (-)skin cancer. Musculoskeletal: (-) fractures,  (-) dislocations,(-) collagen vascular disease, (-) fibromyalgia, (-) multiple sclerosis, (-) muscular dystrophy, (-) RSD,(-) joint pain (-)swelling, (-) joint pain,swelling. Neurologic: (-) epilepsy, (-)seizures,(-) brain tumor,(-) TIA, (-)stroke, (-)headaches, (-)Parkinson disease,(-) memory loss, (-) LOC. Cardiovascular: (-) Chest pain, (-) swelling in legs/feet, (-) SOB, (-) cramping in legs/feet with walking. Respiratory: (-) SOB, (-) Coughing, (-) night sweats. GI: (-) nausea, (-) vomiting, (-) diarrhea, (-) blood in stool, (-) gastric ulcer. Psychiatric: (-) Depression, (-) Anxiety, (-) bipolar disease, (-) Alzheimer's Disease  Allergic/Immunologic: (-) allergies latex, (-) allergies metal, (-) skin sensitivity. Hematlogic: (-) anemia, (-) blood transfusion, (-) DVT/PE, (-) Clotting disorders      Subjective:    Constitution:  Temp 98 °F (36.7 °C)   Ht 6' 1\" (1.854 m)   Wt 230 lb (104.3 kg)   BMI 30.34 kg/m²     Psycihatric:  The patient is alert and oriented x 3, appears to be stated age and in no distress. Respiratory:  Respiratory effort is not labored. Patient is not gasping.   Palpation of the chest reveals no tactile fremitus. Skin:  Upon inspection: the skin appears warm, dry and intact. There is  a previous scar over the affected area. There is not any cellulitis, lymphedema or cutaneous lesions noted in the lower extremities. Upon palpation there is no induration noted. Neurologic:  Motor exam of the upper extremities show: The reflexes in biceps/triceps/brachioradialis are equal and symmetric. Sensory exam C5-T1 are normal bilaterally. Cardiovascular: The vascular exam is normal and is well perfused to distal extremities. There are 2+ radial pulses bilaterally, and motor and sensation is intact to median, ulnar, and radial, musclocutaneus, and axillary nerve distribution and grossly symmetric bilaterally. There is cap refill noted less than two seconds in all digits. There is not edema of the bilateral upper extremities. There is not varicosities noted in the distal extremities. Lymph:  Upon palpation,  there is no lymphadenopathy noted in bilateral upper extremities. Musculoskeletal:  Gait: normal; examination of the nails and digits reveal no cyanosis or clubbing. Cervical Exam:  On physical exam, Evita Crump is well-developed, well-nourished, oriented to person, place and time. his gait is normal.  On evaluation of hiscervical spine, He has full range of motion of the cervical spine without pain. There is no cervical tenderness to palpation. Shoulder Exam:  On evaluation of his bilaterally upper extremities, his left shoulder has no deformity. There is tenderness upon palpation of the lateral and anterior shoulder. There is not evidence of scapular dyskinesis. There is not muscle atrophy in shoulder girdle. The range of motion for the Right Shoulder is 160/45/T8 and for the Left shoulder is 90/40/L2.   Right shoulder Motor strength is 5/5 in the supraspinatus, 5/5 internal rotation and 5/5 in external rotation, and Left shoulder motor strength 3+/5 in supraspinatus, 5-/5 in internal rotation, 5-/5 in external rotation. Axillary nerve partial paresthesia. Right shoulder:  negative Impingement , negative Roper ,negative  Speeds,negative  Apprehension ,negative Moran Load Shift, negative Ramin manuver, negative Cross arm test.     Left shoulder:  positive Impingement , positive Roper ,positive  Speeds,negative  Apprehension ,negative Moran Load Shift, negative Ramin manuver, negative Cross arm test.     XRAY:    1. There is no fracture dislocation of the left shoulder   2. Mild degenerative changes of the glenohumeral joint. MRI:    1. Mild bursal surface tendinopathy supraspinatus and infraspinatus tendons. 2. Complete tear of the biceps tendon. The tendon free edge is demonstrated   at the inferior aspect of the intertubercular groove. 3. SLAP tear. No paralabral cyst.   4. Mild subacromial/subdeltoid bursitis. 5. Mild AC joint degenerative change. Radiographic findings reviewed with patient    Impression:   Encounter Diagnoses   Name Primary? Shoulder impingement, left Yes    Biceps tendon tear          Plan: Natural history and expected course discussed. Questions answered. Educational material distributed. I had a lengthy discussion with the patient regarding their diagnosis. I explained treatment options including surgical vs non surgical treatment. I reviewed in detail the risks and benefits and outlined the procedure in detail with expected outcomes and possible complications. I also discussed non surgical treatment such as injections (CSI and visco supplementation), physical therapy, topical creams and NSAID's. They have elected for conservative management at this time. I will proceed with a cortisone injection in the Left shoulder. Verbal and written consent was obtained for the injection.   Skin was prepped with alcohol, 1 ml of Kenalog 40 mg and 9 ml of 0.25% Marcaine was injected to the posterior shoulder through the subacromial space of the Left Shoulder. The patient tolerated the injections well. I will see the patient back prn. PT  At least 30 minutes was spent discussing the diagnosis and treatment options with the patient with at least 50% of the time was spent with decision making and counseling the patient.

## 2023-02-10 ENCOUNTER — EVALUATION (OUTPATIENT)
Dept: PHYSICAL THERAPY | Age: 63
End: 2023-02-10

## 2023-02-10 DIAGNOSIS — S46.219A BICEPS TENDON TEAR: ICD-10-CM

## 2023-02-10 DIAGNOSIS — M75.42 SHOULDER IMPINGEMENT, LEFT: Primary | ICD-10-CM

## 2023-02-10 PROBLEM — M25.812 SHOULDER IMPINGEMENT, LEFT: Status: ACTIVE | Noted: 2023-02-10

## 2023-02-10 NOTE — PROGRESS NOTES
[facility] OUTPATIENT REHABILITATION  PHYSICAL THERAPY INITIAL EVALUATION         Date:  2/10/2023   Patient: Rand Tom  : 1960  MRN: [id number]  Referring Provider: DO Gissell CervantesArmani whiting Monty     Medical Diagnosis:      Diagnosis Orders   1. Shoulder impingement, left        2. Biceps tendon tear            Physician Order: Eval and Treat      SUBJECTIVE:     Onset date:     History / Mechanism of Injury: Reports onset of L shoulder problems since cervical fusion 2021. He reports bilateral neuropathy and B UE dysfunction along with shoulder problems. EMG 23 notes C6 cervical radiculopathy and L ulnar neuropathy at the elbow. Patient is right handed. Additional report includes chronic back and LE problems with history of lumbar laminectomy L3-5. Uses cane for occasional assistance. EMG report:  Diagnostic Interpretation: This study was complexly abnormal.      Electrodiagnosis: There is electrodiagnostic evidence of a cervical radiculopathy. Location: left C6. Nature: [ X ] Axonal   [  ] Demyelinating  [  ] Mixed axonal and demyelinating                     [  ] Sensory [ X ] Motor               [  ] Mixed sensorimotor                     [ X ] with active denervation       [  ] without active denervation  Duration: Chronic  Severity: moderate  Prognosis: Poor     Electrodiagnosis: There is electrodiagnostic evidence of an ulnar neuropathy. Location: left at the elbow. Nature: [  ] Axonal   [  ] Demyelinating  [ X ] Mixed axonal and demyelinating                           [  ] Sensory [  ] Motor               Jyoti.Jose Roberto  ] Mixed sensorimotor                           [ X ] with active denervation       [  ] without active denervation  Duration: Subacute  Severity: severe  Prognosis: Fair. The prognosis for recovery of demyelinating lesions is good if the cause is alleviated.   The prognosis for recovery of axonal lesions is poor and dependant on collateral sprouting and reinnervation. Chief complaint: pain, weakness, inability / limited ability to use arm, limited ability to complete ADLs (dressing , bathing), limited ability to complete IADLs (cooking, cleaning, laundry), limited ability to lift/carry/handle material, limited ability to complete home/outdoor chores/tasks, unable to golf    Pain:   Pain 5-8/10  Pain frequency: constant    Symptom Type / Quality: dull, aching, sharp  Location[de-identified] anterior        Aggravated by: reaching overhead, reaching out, reaching behind back, lifting/carrying/material handling    Relieved by: rest    Imaging results: XR SHOULDER LEFT (MIN 2 VIEWS)    Result Date: 1/19/2023  EXAMINATION: THREE XRAY VIEWS OF THE LEFT SHOULDER 1/19/2023 8:55 am COMPARISON: None. HISTORY: ORDERING SYSTEM PROVIDED HISTORY: Acute pain of left shoulder FINDINGS: There is no fracture dislocation of the left shoulder. There are mild degenerative changes of the glenohumeral joint. The [facility] joint is intact. There is no abnormality of the clavicle The left upper lobe is clear. 1. There is no fracture dislocation of the left shoulder 2. Mild degenerative changes of the glenohumeral joint. MRI SHOULDER LEFT WO CONTRAST    Result Date: 1/28/2023  EXAMINATION: MRI OF THE LEFT SHOULDER WITHOUT CONTRAST   1/26/2023 4:36 pm TECHNIQUE: Multiplanar multisequence MRI of the left shoulder was performed without the administration of intravenous contrast. COMPARISON: Radiographs January 19, 2023 HISTORY: ORDERING SYSTEM PROVIDED HISTORY: Traumatic complete tear of left rotator cuff, initial encounter TECHNOLOGIST PROVIDED HISTORY: Reason for exam:->pain FINDINGS: ROTATOR CUFF: Mild bursal surface tendinopathy supraspinatus and infraspinatus tendons. Otherwise, intact supraspinatus, infraspinatus, subscapularis and teres minor tendons. No significant muscle edema or atrophy.  BICEPS TENDON: Complete tear of the biceps tendon. The tendon free edge is demonstrated at the inferior aspect of the intertubercular groove. LABRUM: SLAP tear. No paralabral cyst. GLENOHUMERAL JOINT: Physiologic amount of joint fluid. No evidence of high-grade cartilage loss. Normal alignment. AC JOINT AND ACROMIOCLAVICULAR ARCH: No significant acromial downsloping or subacromial spur. Mild AC joint degenerative change. Intact acromioclavicular and coracoclavicular ligaments. Mild subacromial/subdeltoid bursitis. BONE MARROW: No evidence of fracture. Normal marrow signal.  Mild subchondral fibrocystic change at the supraspinatus insertion site. OUTLET SPACES: Normal MRI appearance of the quadrilateral space. No significant narrowing of the supraspinatus outlet. 1. Mild bursal surface tendinopathy supraspinatus and infraspinatus tendons. 2. Complete tear of the biceps tendon. The tendon free edge is demonstrated at the inferior aspect of the intertubercular groove. 3. SLAP tear. No paralabral cyst. 4. Mild subacromial/subdeltoid bursitis. 5. Mild AC joint degenerative change. RECOMMENDATIONS: Careful clinical correlation and follow up recommended.        Past Medical History:  Past Medical History:   Diagnosis Date    Acid reflux     GERD (gastroesophageal reflux disease)     Hyperlipidemia     Hypertension     Hypokalemia     Hypomagnesemia     Low back pain      Past Surgical History:   Procedure Laterality Date    ANESTHESIA NERVE BLOCK Right 1/14/2020    RIGHT L2,3,4 MEDIAL BRANCH BLOCK WITHOUT SEDATION (CPT 13432,01750) performed by Brandon Parikh MD at [facility] Right 2/20/2020    RIGHT L2,3,4 MEDIAL BRANCH BLOCK (CPT 73194,16776) performed by Brandon Parikh MD at [facility] Left 6/29/2020    LEFT L2 L3 L4 MEDIAL BRANCH BLOCK (CPT 13561) (WANTS AFTERNOON APPOINTMENT) performed by Brandon Parikh MD at [address] [facility]      May 4, 2018 decompression with  Killian.     CARPAL TUNNEL RELEASE Left 1 24 14    CARPAL TUNNEL RELEASE      left    COLONOSCOPY      FRACTURE SURGERY      arm    FRACTURE SURGERY      left arm    HERNIA REPAIR      HERNIA REPAIR      x2 child    KNEE ARTHROSCOPY Left 02/20/15    NERVE BLOCK Right 01/14/2020    NERVE BLOCK Right 02/20/2020    L2,3,4 medial     NERVE BLOCK Right 05/04/2020    L2,3,4,5 medial radiofrequency     NERVE BLOCK Left 06/29/2020    medial branch block    PAIN MANAGEMENT PROCEDURE Right 5/4/2020    RIGHT L2, 3, 4 MEDIAL BRANCH L5 DORSALRAMUS RADIOFREQUENCY ABLATION performed by Paige Samuel MD at [facility]         Medications:   Current Outpatient Medications   Medication Sig Dispense Refill    potassium chloride (KLOR-CON) 20 MEQ packet Take 20 mEq by mouth daily      magnesium oxide (MAG-OX) 400 MG tablet Take 400 mg by mouth 3 x day per Dr. Nola العلي      ibuprofen (ADVIL;MOTRIN) 800 MG tablet Take 800 mg by mouth every 8 hours as needed for Pain      VENTOLIN  (90 Base) MCG/ACT inhaler INL 1 TO 2 PFS PO Q 4 TO 6 H PRN  0    losartan (COZAAR) 100 MG tablet Take 1 tablet by mouth daily 30 tablet 3    Lansoprazole (PREVACID PO) Take 15 mg by mouth daily. ezetimibe (ZETIA) 10 MG tablet Take 10 mg by mouth daily. fenofibrate (TRICOR) 145 MG tablet Take 145 mg by mouth daily   2     No current facility-administered medications for this visit. Hobbies: golfing    Patient Goals:  return golfing    Precautions/Contraindications: none    OBJECTIVE:     Estimated body mass index is 30.34 kg/m² as calculated from the following:    Height as of 2/6/23: 6' 1\" (1.854 m). Weight as of 2/6/23: 230 lb (104.3 kg). Observations: well nourished male, normal affect. Entered clinic with cane; frontal plane strategies during gait.       Inspection: atrophy L triceps, L biceps bulge noted      Joint/Motion:    Neck:  Neck AROM is WNL and non-provocative with overpressure Shoulder:  Bilateral shoulder ROM is symmetrical at 150 elevation, 55 ER, and IR to L4. Accessory muscle use and alternate strategies used to elevate L shoulder seen with both cuff problems as well as neurological weakness. Strength:  Right Shoulder: Flexion 5/5,  Abduction 5/5, ER 5/5, IR 5/5      Left Shoulder: Flexion 3-/5,  Abduction 3-/5, ER 2/5, IR 2/5   Additional UE strength testing:  L: Delts 3-/5, Triceps 2/5, Biceps 3/5, Supination 3+/5, Wrist flexion 3/5, Wrist extension 4/5. Remainder or R UE WFL. Palpation: Tender to palpation anterior shoulder. Special Tests/Functional Screens:    [x] Jacquelin-Haider []+ / [x] -  [] Napa's []+ / [] -   []  Nhison's []+ / [] -    []GH drawer []+ / [] -    [] Bicep Load []+ / [] -   [] Crank []+ / [] -  [] Elfida Sauers []+ / [] -   [] Elbow Varus []+ / [] -  [x] Neer's []+ / [x] -      [] Speed's []+ / [] -   []Sulcus Sign []+ / [] -   [] Apprehension []+ / [] -   [] Bicep Load II []+ / [] -   [] Anastacio []+ / [] -   [] Elbow Valgus []+ / [] -     [x] Other: Lift Off Tyler [x]+ / [] -         Angy Castro has shoulder pain and weakness, but his weakness also involves more than just his shoulder. His pattern of weakness is consistent with C5, C6, and C7 involvement (see strength testing in eval). Treatment will be strengthening of the entire limb to maximize function and help patient achieve his goal of returning to golf.       Outcome Measure:   QuickDASH (Disorders of the Arm, Shoulder, and Hand) 66% disability    Problems:   Pain 8/10 constant, waxing / waning  ROM decreased  Strength decreased   Limitations with ADLs , IADLs , use of left upper extremity, reaching, lifting, carrying, inability to participate in hobbies    [x] There are no barriers affecting plan of care or recovery    [] Barriers to this patient's plan of care or recovery include:    Domestic Concerns:  [x] No  [] Yes:    Short Term goals (2-3 weeks)  Pain 4-5/10  Strength 3/5 Able to perform / complete the following functions / tasks: ADLs  QuickDASH (Disorders of the Arm, Shoulder, and Hand) 40% disability    Long Term goals (4-6 weeks)  Pain 0-3/10  ROM WFL  Strength 4/5   Able to perform / complete the following functions / tasks: IADLs, hobbies, reach / lift / carry light weighted items in performance of home or work demands  QuickDASH 30% disability  Independent with Home Exercise Programs    Rehab Potential: [x] Good  [] Fair  [] Poor    PLAN       Treatment Plan:   instruction in home exercise program   therapeutic exercise   therapeutic activity   neuromuscular re-education   proprioceptive training   cold / hot pack  electrical stimulation     The following CPT codes are likely to be used in the care of this patient:   84951 PT Evaluation: High Complexity   33077 PT Re-Evaluation   25063 Therapeutic Exercise   25127 Neuromuscular Re-Education   76630 Therapeutic Activities    Electrical Stimulation    Suggested Professional Referral: [x] No  [] Yes:     Patient Education:  [x] Plans / Goals, Risks / Benefits discussed  [x] Home exercise program  Method of Education: [x] Verbal  [x] Demo  [x] Written  Comprehension of Education:  [x] Verbalizes understanding. [x] Demonstrates understanding. [] Needs Review. [] Demonstrates / verbalizes understanding of HEP / Catalina Sciara previously given. Frequency:  2 days per week for 4-6 weeks    Patient understands diagnosis/prognosis and consents to treatment, plan and goals: [x] Yes    [] No     Thank you for the opportunity to work with your patient. If you have questions or comments, please contact me at [phone]; fax: [phone]. Electronically signed by: Winter Rain, PT    Medicare Patients Only     Please sign Physician's Certification and return to:   [address] [place] [address] PHYSICAL THERAPY  [address] [facility] [place] [address]  Dept: [phone]  Dept Fax: [phone]  Loc: [phone]    Physician's Certification / Comments     Frequency/Duration 2 days per week for 4-6 weeks. Certification period from 2/10/2023  to 5/10/2023. I have reviewed the Plan of Care established for skilled therapy services and certify that the services are required and that they will be provided while the patient is under my care.     Physician's Comments/Revisions:               Physician's Printed Name:                                           [de-identified] Signature:                                                               Date:

## 2023-02-10 NOTE — PROGRESS NOTES
Physical Therapy Daily Treatment Note    Date: 2/10/2023  Patient Name: Ector Dior  : 1960   MRN: [id number]  DOInjury:   DOSx: --   Referring Provider: Sanjay Yuan DO   [address]  [facility]     Medical Diagnosis:      Diagnosis Orders   1. Shoulder impingement, left        2. Biceps tendon tear          Vera Berry has shoulder pain and weakness, but his weakness also involves more than just his shoulder. His pattern of weakness is consistent with C5, C6, and C7 involvement (see strength testing in eval). Treatment will be strengthening of the entire limb to maximize function and help patient achieve his goal of returning to golf. Outcome Measure:   QuickDASH (Disorders of the Arm, Shoulder, and Hand) 66% disability    X = TO BE PERFORMED NEXT VISIT  > = PROGRESS TO THIS    S: See eval  O:    Time 1727-7727     Visit ? Repeat outcome measure at mid point and end. Pain Pain 5-8/10     ROM See eval     Modalities       Use sparingly if at all. Prefer an active program.  MO   Manual         MT   Exercise      ROWS: H X  TA   ROWS: M X  TA   ROWS: L X  TA   ER (towel roll under arm) X  TE   IR (towel roll under arm) X  TE   Flexion in scapular plane >     Shoulder flexion for delts X     Shoulder abd for delts >     Biceps curl >     Triceps press down >                 A:  Tolerated well. Discussed anatomy, physiology, body mechanics, principles of loading, and progressive loading/activity. Reviewed home exercise program extensively; written copy provided.     P: Continue with rehab plan  Adenike Valente PT    Treatment Charges: Mins Units   Initial Evaluation 45 1   Re-Evaluation     Ther Exercise         TE     Manual Therapy     MT     Ther Activities        TA     Gait Training          GT     Neuro Re-education NR     Modalities     Non-Billable Service Time     Other     Total Time/Units 45 1

## 2023-02-15 ENCOUNTER — TREATMENT (OUTPATIENT)
Dept: PHYSICAL THERAPY | Age: 63
End: 2023-02-15

## 2023-02-15 DIAGNOSIS — M75.42 SHOULDER IMPINGEMENT, LEFT: Primary | ICD-10-CM

## 2023-02-15 DIAGNOSIS — S46.219A BICEPS TENDON TEAR: ICD-10-CM

## 2023-02-15 NOTE — PROGRESS NOTES
Physical Therapy Daily Treatment Note    Date: 2/15/2023  Patient Name: Maria Alejandra Marcos  : 1960   MRN: [id number]  DOInjury:   DOSx: --   Referring Provider: Yeison Knox DO   [address]  [facility]     Medical Diagnosis:      Diagnosis Orders   1. Shoulder impingement, left        2. Biceps tendon tear            Clive Burgos has shoulder pain and weakness, but his weakness also involves more than just his shoulder. His pattern of weakness is consistent with C5, C6, and C7 involvement (see strength testing in eval). Treatment will be strengthening of the entire limb to maximize function and help patient achieve his goal of returning to golf. Outcome Measure:   QuickDASH (Disorders of the Arm, Shoulder, and Hand) 66% disability    X = TO BE PERFORMED NEXT VISIT  > = PROGRESS TO THIS    S: shoulder feeling about the same. O:    Time 6663-2473     Visit 2/? Repeat outcome measure at mid point and end. Pain Pain 5-8/10 7/10    ROM See eval     Modalities NO       Use sparingly if at all. Prefer an active program.  MO   Manual         MT   Exercise      UBE 2/2 sitting    ROWS: H Green 2 x 20 sitting TA   ROWS: M Green 2 x 20  TA   ROWS: L Green 2 x 20  TA   ER (towel roll under arm) Red 2 x 20  TE   IR (towel roll under arm) Red 2 x 20  TE   Flexion in scapular plane >     Shoulder flexion for delts 1#  3 x 10     Shoulder abd for delts >     Biceps curl >     Triceps press down >                 A:  Tolerated well. Due to painful back all exs were done sitting. Discussed anatomy, physiology, body mechanics, principles of loading, and progressive loading/activity. Reviewed home exercise program extensively; written copy provided.     P: Continue with rehab plan  Jason Chaves PTA    Treatment Charges: Mins Units   Initial Evaluation     Re-Evaluation     Ther Exercise         TE     Manual Therapy     MT     Ther Activities        TA 40 3   Gait Training          GT    Neuro Re-education NR     Modalities     Non-Billable Service Time     Other     Total Time/Units 40 3

## 2023-02-17 ENCOUNTER — TREATMENT (OUTPATIENT)
Dept: PHYSICAL THERAPY | Age: 63
End: 2023-02-17

## 2023-02-17 DIAGNOSIS — M75.42 SHOULDER IMPINGEMENT, LEFT: Primary | ICD-10-CM

## 2023-02-17 DIAGNOSIS — S46.219A BICEPS TENDON TEAR: ICD-10-CM

## 2023-02-17 NOTE — PROGRESS NOTES
Physical Therapy Daily Treatment Note    Date: 2023  Patient Name: Davis Martines  : 1960   MRN: [id number]  DOInjury:   DOSx: --   Referring Provider: Doris Collins DO   [address]  [facility]     Medical Diagnosis:      Diagnosis Orders   1. Shoulder impingement, left        2. Biceps tendon tear            Lum Brace has shoulder pain and weakness, but his weakness also involves more than just his shoulder. His pattern of weakness is consistent with C5, C6, and C7 involvement (see strength testing in eval). Treatment will be strengthening of the entire limb to maximize function and help patient achieve his goal of returning to golf. Outcome Measure:   QuickDASH (Disorders of the Arm, Shoulder, and Hand) 66% disability    X = TO BE PERFORMED NEXT VISIT  > = PROGRESS TO THIS    S: shoulder feeling about the same. O:    Time 1430--1410     Visit 3 /auth? Repeat outcome measure at mid point and end. Pain Pain 5-7/10 7/10    ROM See eval     Modalities NO       Use sparingly if at all. Prefer an active program.  MO   Manual         MT   Exercise      UBE 2/2 sitting    ROWS: H Green 2 x 20 sitting TA   ROWS: M Green 2 x 20  TA   ROWS: L Green 2 x 20  TA   ER (towel roll under arm) Red 2 x 20  TE   IR (towel roll under arm) Red 2 x 20  TE   Flexion in scapular plane >     Shoulder flexion for delts 1#  3 x 10     Shoulder abd for delts >     Biceps curl >     Triceps press down >                 A:  Tolerated well. Due to continued painful back all exs were still done in sitting. Discussed anatomy, physiology, body mechanics, principles of loading, and progressive loading/activity. Reviewed home exercise program extensively; written copy provided.     P: Continue with rehab plan  Trupti Farooq PTA    Treatment Charges: Mins Units   Initial Evaluation     Re-Evaluation     Ther Exercise         TE 10 1   Manual Therapy     MT     Ther Activities        TA 30 2   Gait Training          GT     Neuro Re-education NR     Modalities     Non-Billable Service Time     Other     Total Time/Units 40 3

## 2023-02-21 ENCOUNTER — TREATMENT (OUTPATIENT)
Dept: PHYSICAL THERAPY | Age: 63
End: 2023-02-21
Payer: COMMERCIAL

## 2023-02-21 DIAGNOSIS — S46.219A BICEPS TENDON TEAR: ICD-10-CM

## 2023-02-21 DIAGNOSIS — M75.42 SHOULDER IMPINGEMENT, LEFT: Primary | ICD-10-CM

## 2023-02-21 PROCEDURE — 97110 THERAPEUTIC EXERCISES: CPT

## 2023-02-21 PROCEDURE — 97530 THERAPEUTIC ACTIVITIES: CPT

## 2023-02-21 NOTE — PROGRESS NOTES
Physical Therapy Daily Treatment Note    Date: 2023  Patient Name: Naomi Lai  : 1960   MRN: [id number]  DOInjury:   DOSx: --   Referring Provider: Oseas Yap DO   [address]  [facility]     Medical Diagnosis:      Diagnosis Orders   1. Shoulder impingement, left        2. Biceps tendon tear              Apolonia Hinojosa has shoulder pain and weakness, but his weakness also involves more than just his shoulder. His pattern of weakness is consistent with C5, C6, and C7 involvement (see strength testing in eval). Treatment will be strengthening of the entire limb to maximize function and help patient achieve his goal of returning to golf. Outcome Measure:   QuickDASH (Disorders of the Arm, Shoulder, and Hand) 66% disability    X = TO BE PERFORMED NEXT VISIT  > = PROGRESS TO THIS    S: shoulder feeling about the same. O:    Time 3449-8574     Visit  /?  denied  52003- 36 units  06987- 36 units  64131- 36 units  2/15/2023 through 2023    Pain Pain 5-7/10 7/10    ROM See eval     Modalities NO       Use sparingly if at all. Prefer an active program.  MO   Manual         MT   Exercise      UBE 2/2 sitting    ROWS: H Green 2 x 20 sitting TA   ROWS: M Green 2 x 20 sitting TA   ROWS: L Green 2 x 20 sitting TA   ER (towel roll under arm) Red 2 x 20 sitting TE   IR (towel roll under arm) Red 2 x 20 sitting TE   Flexion in scapular plane >     Shoulder flexion for delts 1#  3 x 10 sitting    Shoulder abd for delts >     Biceps curl 3# 10x, 2# 2 x 10     Triceps press down >                 A:  Tolerated well. Due to continued painful back all exs were still done in sitting. Discussed anatomy, physiology, body mechanics, principles of loading, and progressive loading/activity. Reviewed home exercise program extensively; written copy provided.     P: Continue with rehab plan  Jacob Graves   denied  [phone]  units  05876- 36 units  [phone]  units  2/15/2023 through 5/31/2023  Treatment Charges: Mins Units   Initial Evaluation     Re-Evaluation     Ther Exercise         TE 10 1   Manual Therapy     MT     Ther Activities        TA 30 2   Gait Training          GT     Neuro Re-education NR     Modalities     Non-Billable Service Time     Other     Total Time/Units 40 3

## 2023-02-23 ENCOUNTER — TREATMENT (OUTPATIENT)
Dept: PHYSICAL THERAPY | Age: 63
End: 2023-02-23

## 2023-02-23 DIAGNOSIS — S46.219A BICEPS TENDON TEAR: ICD-10-CM

## 2023-02-23 DIAGNOSIS — M75.42 SHOULDER IMPINGEMENT, LEFT: Primary | ICD-10-CM

## 2023-02-23 NOTE — PROGRESS NOTES
Physical Therapy Daily Treatment Note    Date: 2023  Patient Name: Lydia Whitney  : 1960   MRN: [id number]  DOInjury:   DOSx: --   Referring Provider: Judith Gallardo DO   [address]  [facility]     Medical Diagnosis:      Diagnosis Orders   1. Shoulder impingement, left        2. Biceps tendon tear                Faviola Real has shoulder pain and weakness, but his weakness also involves more than just his shoulder. His pattern of weakness is consistent with C5, C6, and C7 involvement (see strength testing in eval). Treatment will be strengthening of the entire limb to maximize function and help patient achieve his goal of returning to golf. Outcome Measure:   QuickDASH (Disorders of the Arm, Shoulder, and Hand) 66% disability    X = TO BE PERFORMED NEXT VISIT  > = PROGRESS TO THIS    S: shoulder hurting a little more today  O:    Time 6433-0454     Visit ?  denied  75110- 36 units  73828- 36 units  25586- 36 units  2/15/2023 through 2023    Pain Pain 5-710 7/10    ROM See eval     Modalities NO       Use sparingly if at all. Prefer an active program.  MO   Manual         MT   Exercise      UBE 2/2 sitting    ROWS: H Green 2 x 20 sitting TA   ROWS: M Green 2 x 20 sitting TA   ROWS: L Green 2 x 20 sitting TA   ER (towel roll under arm) Red 2 x 20 sitting TE   IR (towel roll under arm) Red 2 x 20 sitting TE   Flexion in scapular plane >     Shoulder flexion for delts 1#  3 x 10 sitting    Shoulder abd for delts >     Biceps curl 2# 3 x 10 sitting    Triceps press down >                 A:  Tolerated well. Due to continued painful back all exs were still done in sitting. Discussed anatomy, physiology, body mechanics, principles of loading, and progressive loading/activity. Reviewed home exercise program extensively; written copy provided.     P: Continue with rehab plan  Jasvir [place]   denied  [phone]- 3/36 units  71888- 36 units  [phone] 36 units  2/15/2023 through 5/31/2023  Treatment Charges: Mins Units   Initial Evaluation     Re-Evaluation     Ther Exercise         TE 10 1   Manual Therapy     MT     Ther Activities        TA 30 2   Gait Training          GT     Neuro Re-education NR     Modalities     Non-Billable Service Time     Other     Total Time/Units 40 3

## 2023-02-28 ENCOUNTER — TREATMENT (OUTPATIENT)
Dept: PHYSICAL THERAPY | Age: 63
End: 2023-02-28

## 2023-02-28 DIAGNOSIS — M75.42 SHOULDER IMPINGEMENT, LEFT: Primary | ICD-10-CM

## 2023-02-28 DIAGNOSIS — S46.219A BICEPS TENDON TEAR: ICD-10-CM

## 2023-02-28 NOTE — PROGRESS NOTES
Physical Therapy Daily Treatment Note    Date: 2023  Patient Name: Rand Tom  : 1960   MRN: [id number]  DOInjury:   DOSx: --   Referring Provider: Yudi Marcus DO   [address]  [facility]     Medical Diagnosis:      Diagnosis Orders   1. Shoulder impingement, left        2. Biceps tendon tear                  Bonnie Poole has shoulder pain and weakness, but his weakness also involves more than just his shoulder. His pattern of weakness is consistent with C5, C6, and C7 involvement (see strength testing in eval). Treatment will be strengthening of the entire limb to maximize function and help patient achieve his goal of returning to golf. Outcome Measure:   QuickDASH (Disorders of the Arm, Shoulder, and Hand) 66% disability    X = TO BE PERFORMED NEXT VISIT  > = PROGRESS TO THIS    S: shoulder hurting a little more today  O:    Time 7593-0960     Visit ?  denied  44509- 36 units  60658- 36 units  09689- 36 units  2/15/2023 through 2023    Pain Pain 5-7/10 710    ROM See eval     Modalities NO       Use sparingly if at all. Prefer an active program.  MO   Manual         MT   Exercise      UBE 2/2 sitting    ROWS: H Green 2 x 20 sitting TA   ROWS: M Green 2 x 20 sitting TA   ROWS: L Green 2 x 20 sitting TA   ER (towel roll under arm) Red 2 x 20 sitting TE   IR (towel roll under arm) Red 2 x 20 sitting TE   Flexion in scapular plane >     Shoulder flexion for delts 2#  3 x 10 sitting    Shoulder abd for delts >     Biceps curl 2# 3 x 10 sitting    Triceps press down >                 A:  Tolerated well. Increased shlder flex from 1# to 2# Due to continued painful back all exs were still done in sitting. Discussed anatomy, physiology, body mechanics, principles of loading, and progressive loading/activity. Reviewed home exercise program extensively; written copy provided.     P: Continue with rehab plan  [place], [place]   denied  14740-  units  26789- 36 units  46280- 13/36 units  2/15/2023 through 5/31/2023  Treatment Charges: Mins Units   Initial Evaluation     Re-Evaluation     Ther Exercise         TE 10 1   Manual Therapy     MT     Ther Activities        TA 30 2   Gait Training          GT     Neuro Re-education NR     Modalities     Non-Billable Service Time     Other     Total Time/Units 40 3

## 2023-03-02 ENCOUNTER — TREATMENT (OUTPATIENT)
Dept: PHYSICAL THERAPY | Age: 63
End: 2023-03-02

## 2023-03-02 DIAGNOSIS — M75.42 SHOULDER IMPINGEMENT, LEFT: Primary | ICD-10-CM

## 2023-03-02 DIAGNOSIS — S46.219A BICEPS TENDON TEAR: ICD-10-CM

## 2023-03-02 NOTE — PROGRESS NOTES
Physical Therapy Daily Treatment Note    Date: 3/2/2023  Patient Name: Hung Borges  : 1960   MRN: [id number]  DOInjury:   DOSx: --   Referring Provider: Lavern Blunt DO   [address]  [facility]     Medical Diagnosis:      Diagnosis Orders   1. Shoulder impingement, left        2. Biceps tendon tear                    Nicole Hurtado has shoulder pain and weakness, but his weakness also involves more than just his shoulder. His pattern of weakness is consistent with C5, C6, and C7 involvement (see strength testing in eval). Treatment will be strengthening of the entire limb to maximize function and help patient achieve his goal of returning to golf. Outcome Measure:   QuickDASH (Disorders of the Arm, Shoulder, and Hand) 66% disability    X = TO BE PERFORMED NEXT VISIT  > = PROGRESS TO THIS    S: shoulder hurting a little more today. For no particular reason. O:    Time 1486-8089     Visit ?  denied  46813- 36 units  89675- 36 units  49932- 36 units  2/15/2023 through 2023    Pain Pain 5-7/10 7/10    ROM See eval     Modalities NO       Use sparingly if at all. Prefer an active program.  MO   Manual         MT   Exercise      UBE 2/2 sitting    ROWS: H Green 2 x 20 sitting TA   ROWS: M blue 2 x 20 sitting TA   ROWS: L Green 2 x 20 sitting TA   ER (towel roll under arm) Red 2 x 20 sitting TE   IR (towel roll under arm) Red 2 x 20 sitting TE   Flexion in scapular plane >     Shoulder flexion for delts 2#  3 x 10 sitting    Shoulder abd for delts >     Biceps curl 2# 3 x 10 sitting    Triceps press down >                 A:  Tolerated well. Increased shlder flex from 1# to 2# Due to continued painful back all exs were still done in sitting. Discussed anatomy, physiology, body mechanics, principles of loading, and progressive loading/activity. Reviewed home exercise program extensively; written copy provided.     P: Continue with rehab plan  Aniya Rider PTA   denied  21543- 6/36 units  42905- 36 units  82797- 15/36 units  2/15/2023 through 5/31/2023  Treatment Charges: Mins Units   Initial Evaluation     Re-Evaluation     Ther Exercise         TE 10 1   Manual Therapy     MT     Ther Activities        TA 30 2   Gait Training          GT     Neuro Re-education NR     Modalities     Non-Billable Service Time     Other     Total Time/Units 40 3

## 2023-03-07 ENCOUNTER — TREATMENT (OUTPATIENT)
Dept: PHYSICAL THERAPY | Age: 63
End: 2023-03-07
Payer: COMMERCIAL

## 2023-03-07 DIAGNOSIS — S46.219A BICEPS TENDON TEAR: ICD-10-CM

## 2023-03-07 DIAGNOSIS — M75.42 SHOULDER IMPINGEMENT, LEFT: Primary | ICD-10-CM

## 2023-03-07 PROCEDURE — 97530 THERAPEUTIC ACTIVITIES: CPT

## 2023-03-07 PROCEDURE — 97110 THERAPEUTIC EXERCISES: CPT

## 2023-03-07 NOTE — PROGRESS NOTES
Physical Therapy Daily Treatment Note    Date: 3/7/2023  Patient Name: Mike Martinez  : 1960   MRN: [id number]  DOInjury:   DOSx: --   Referring Provider: Didier Desir DO   [address]  [facility]     Medical Diagnosis:      Diagnosis Orders   1. Shoulder impingement, left        2. Biceps tendon tear                    Rosibel Chapman has shoulder pain and weakness, but his weakness also involves more than just his shoulder. His pattern of weakness is consistent with C5, C6, and C7 involvement (see strength testing in eval). Treatment will be strengthening of the entire limb to maximize function and help patient achieve his goal of returning to golf. Outcome Measure:   QuickDASH (Disorders of the Arm, Shoulder, and Hand) 66% disability    X = TO BE PERFORMED NEXT VISIT  > = PROGRESS TO THIS    S:  pt reports over all he is getting better . O:    Time 0395-7817 am     Visit  8 visit     denied  18830- 36 units  28697- 36 units  84625- 36 units  2/15/2023 through 2023    Pain Pain  -5/10     ROM See eval     Modalities NO       Use sparingly if at all. Prefer an active program.  MO   Manual        Pt has all t tubing for HEP 3/07/2023  MT   Exercise      UBE 2/2 sitting    ROWS: H blue 2 x 20 sitting TA   ROWS: M blue 2 x 20 sitting TA   ROWS: L  blue 2 x 20 sitting TA   ER (towel roll under arm) Red 2 x 20 sitting TE   IR (towel roll under arm) Red 2 x 20 sitting TE   Flexion in scapular plane >     Shoulder flexion for delts 2#  3 x 10 sitting    Shoulder abd for delts >     Biceps curl 2# 3 x 10 sitting    Triceps press down >                 A:  Tolerated well. Due to continued painful back all exs were still done in sitting. Discussed anatomy, physiology, body mechanics, principles of loading, and progressive loading/activity. Reviewed home exercise program extensively; written copy provided.     P: Continue with rehab plan  [facility],  [id number]  75520- 7/36 units  65857- 36 units  47447- 16/36 units  2/15/2023 through 5/31/2023  Treatment Charges: Mins Units   Initial Evaluation     Re-Evaluation     Ther Exercise         TE 10 1   Manual Therapy     MT     Ther Activities        TA 20 1   Gait Training          GT     Neuro Re-education NR     Modalities     Non-Billable Service Time     Other     Total Time/Units 30 2

## 2023-03-09 ENCOUNTER — TREATMENT (OUTPATIENT)
Dept: PHYSICAL THERAPY | Age: 63
End: 2023-03-09
Payer: COMMERCIAL

## 2023-03-09 DIAGNOSIS — M75.42 SHOULDER IMPINGEMENT, LEFT: Primary | ICD-10-CM

## 2023-03-09 DIAGNOSIS — S46.219A BICEPS TENDON TEAR: ICD-10-CM

## 2023-03-09 PROCEDURE — 97530 THERAPEUTIC ACTIVITIES: CPT

## 2023-03-09 PROCEDURE — 97110 THERAPEUTIC EXERCISES: CPT

## 2023-03-09 NOTE — PROGRESS NOTES
Physical Therapy Daily Treatment Note    Date: 3/9/2023  Patient Name: Mike Martinez  : 1960   MRN: [id number]  DOInjury:   DOSx: --   Referring Provider: Didier Desir DO   [address]  [facility]     Medical Diagnosis:      Diagnosis Orders   1. Shoulder impingement, left        2. Biceps tendon tear                      Rosibel Chapman has shoulder pain and weakness, but his weakness also involves more than just his shoulder. His pattern of weakness is consistent with C5, C6, and C7 involvement (see strength testing in eval). Treatment will be strengthening of the entire limb to maximize function and help patient achieve his goal of returning to golf. Outcome Measure:   QuickDASH (Disorders of the Arm, Shoulder, and Hand) 66% disability    X = TO BE PERFORMED NEXT VISIT  > = PROGRESS TO THIS    S:  pt reports over all he is getting better . O:    Time 0314-6181 am     Visit  9 visit     denied  92979- 36 units  87928- 36 units  42954- 36 units  2/15/2023 through 2023    Pain Pain 6-7/10     ROM See eval     Modalities NO       Use sparingly if at all. Prefer an active program.  MO   Manual        Pt has all t tubing for HEP 3/07/2023  MT   Exercise      UBE 2/2 sitting    ROWS: H blue 2 x 20 sitting TA   ROWS: M blue 2 x 20 sitting TA   ROWS: L  blue 2 x 20 sitting TA   ER (towel roll under arm) Red 2 x 20 sitting TE   IR (towel roll under arm) Red 2 x 20 sitting TE   Flexion in scapular plane >     Shoulder flexion for delts 2#  3 x 10 sitting    Shoulder abd for delts >     Biceps curl 2# 3 x 10 sitting    Triceps press down >                 A:  Tolerated well. Due to continued painful back all exs were still done in sitting. Discussed anatomy, physiology, body mechanics, principles of loading, and progressive loading/activity. Reviewed home exercise program extensively; written copy provided.     P: Continue with rehab plan  Alexis Chapman,  [facility] [phone] denied  82936- 8/36 units  09517- 36 units  92944- 17/36 units  2/15/2023 through 5/31/2023  Treatment Charges: Mins Units   Initial Evaluation     Re-Evaluation     Ther Exercise         TE 10 1   Manual Therapy     MT     Ther Activities        TA 20 1   Gait Training          GT     Neuro Re-education NR     Modalities     Non-Billable Service Time     Other     Total Time/Units 30 2

## 2023-03-14 ENCOUNTER — TREATMENT (OUTPATIENT)
Dept: PHYSICAL THERAPY | Age: 63
End: 2023-03-14
Payer: COMMERCIAL

## 2023-03-14 DIAGNOSIS — S46.219A BICEPS TENDON TEAR: ICD-10-CM

## 2023-03-14 DIAGNOSIS — M75.42 SHOULDER IMPINGEMENT, LEFT: Primary | ICD-10-CM

## 2023-03-14 PROCEDURE — 97110 THERAPEUTIC EXERCISES: CPT

## 2023-03-14 PROCEDURE — 97530 THERAPEUTIC ACTIVITIES: CPT

## 2023-03-14 NOTE — PROGRESS NOTES
Physical Therapy Daily Treatment Note    Date: 3/14/2023  Patient Name: Lashawn Elaine  : 1960   MRN: [id number]  DOInjury:   DOSx: --   Referring Provider: Miguel Gordon DO   [address]  [facility]     Medical Diagnosis:      Diagnosis Orders   1. Shoulder impingement, left        2. Biceps tendon tear            Ysabel Ruiz has shoulder pain and weakness, but his weakness also involves more than just his shoulder. His pattern of weakness is consistent with C5, C6, and C7 involvement (see strength testing in eval). Treatment will be strengthening of the entire limb to maximize function and help patient achieve his goal of returning to golf. Outcome Measure:   QuickDASH (Disorders of the Arm, Shoulder, and Hand) 66% disability    X = TO BE PERFORMED NEXT VISIT  > = PROGRESS TO THIS    S:  pt reports over all he is getting better . O:    Time 4011-3428 am     Visit  10 visit     denied  27653- 36 units  90015- 36 units  98244- 36 units  2/15/2023 through 2023    Pain Pain 6-7/10     ROM See eval     Modalities NO       Use sparingly if at all. Prefer an active program.  MO   Manual        Pt has all t tubing for HEP 3/07/2023  MT   Exercise      UBE 2/2 sitting    ROWS: H blue 2 x 20 sitting TA   ROWS: M blue 2 x 20 sitting TA   ROWS: L  blue 2 x 20 sitting TA   ER (towel roll under arm) Red 2 x 20 sitting TE   IR (towel roll under arm) Red 2 x 20 sitting TE   Flexion in scapular plane >     Shoulder flexion for delts 2#  2 x 15 sitting    Shoulder abd for delts 2# 3 x 10 1# 3 x 10     Biceps curl 2# 2 x 15 sitting    Triceps press down >                 A:  Tolerated well. Due to continued painful back all exs were still done in sitting. Discussed anatomy, physiology, body mechanics, principles of loading, and progressive loading/activity. Reviewed home exercise program extensively; written copy provided.     P: Continue with rehab plan  Husam Graves, PTA   denied  40848- 9/36 units  76162- 36 units  11183- 18/36 units  2/15/2023 through 5/31/2023  Treatment Charges: Mins Units   Initial Evaluation     Re-Evaluation     Ther Exercise         TE 10 1   Manual Therapy     MT     Ther Activities        TA 20 1   Gait Training          GT     Neuro Re-education NR     Modalities     Non-Billable Service Time     Other     Total Time/Units 30 2

## 2023-03-16 ENCOUNTER — TREATMENT (OUTPATIENT)
Dept: PHYSICAL THERAPY | Age: 63
End: 2023-03-16

## 2023-03-16 DIAGNOSIS — M75.42 SHOULDER IMPINGEMENT, LEFT: Primary | ICD-10-CM

## 2023-03-16 DIAGNOSIS — S46.219A BICEPS TENDON TEAR: ICD-10-CM

## 2023-03-16 NOTE — PROGRESS NOTES
Physical Therapy Daily Treatment Note    Date: 3/16/2023  Patient Name: Per Stroud  : 1960   MRN: [id number]  DOInjury:   DOSx: --   Referring Provider: Dhaval Molina DO   [place] [id number]     Medical Diagnosis:      Diagnosis Orders   1. Shoulder impingement, left        2. Biceps tendon tear              Per has shoulder pain and weakness, but his weakness also involves more than just his shoulder.  His pattern of weakness is consistent with C5, C6, and C7 involvement (see strength testing in eval).  Treatment will be strengthening of the entire limb to maximize function and help patient achieve his goal of returning to golf.       Outcome Measure:   QuickDASH (Disorders of the Arm, Shoulder, and Hand) 66% disability    X = TO BE PERFORMED NEXT VISIT  > = PROGRESS TO THIS    S:  pt reports over all he is getting better .    O:    Time 9205-3890 am     Visit  11 visit     denied  49948- 36 units  80756- 36 units  35143- 36 units  2/15/2023 through 2023    Pain Pain 6-7/10     ROM See eval     Modalities NO       Use sparingly if at all.  Prefer an active program.  MO   Manual        Pt has all t tubing for HEP 3/07/2023  MT   Exercise      UBE 2/2 sitting    ROWS: H blue 2 x 20 sitting TA   ROWS: M blue 2 x 20 sitting TA   ROWS: L  blue 2 x 20 sitting TA   ER (towel roll under arm) Red 2 x 20 sitting TE   IR (towel roll under arm) Red 2 x 20 sitting TE   Flexion in scapular plane >     Shoulder flexion for delts 2#  2 x 15 sitting    Shoulder abd for delts 2# 3 x 10 1# 3 x 10     Biceps curl 2# 2 x 15 sitting    Triceps press down >                 A:  Tolerated well.   Due to continued painful back all exs were still done in sitting.  Discussed anatomy, physiology, body mechanics, principles of loading, and progressive loading/activity.  Reviewed home exercise program extensively; written copy provided.    P: Continue with rehab plan  Viktoria Bass,  PTA   denied  87817- 10/36 units  04488- 36 units  64377- 20/36 units  2/15/2023 through 5/31/2023  Treatment Charges: Mins Units   Initial Evaluation     Re-Evaluation     Ther Exercise         TE 10 1   Manual Therapy     MT     Ther Activities        TA 20 1   Gait Training          GT     Neuro Re-education NR     Modalities     Non-Billable Service Time     Other     Total Time/Units 30 2

## 2023-03-20 ENCOUNTER — OFFICE VISIT (OUTPATIENT)
Dept: ORTHOPEDIC SURGERY | Age: 63
End: 2023-03-20
Payer: COMMERCIAL

## 2023-03-20 VITALS — BODY MASS INDEX: 30.48 KG/M2 | HEIGHT: 73 IN | TEMPERATURE: 98 F | WEIGHT: 230 LBS

## 2023-03-20 DIAGNOSIS — S46.219A BICEPS TENDON TEAR: ICD-10-CM

## 2023-03-20 DIAGNOSIS — M75.42 SHOULDER IMPINGEMENT, LEFT: Primary | ICD-10-CM

## 2023-03-20 PROCEDURE — 1036F TOBACCO NON-USER: CPT | Performed by: ORTHOPAEDIC SURGERY

## 2023-03-20 PROCEDURE — 3017F COLORECTAL CA SCREEN DOC REV: CPT | Performed by: ORTHOPAEDIC SURGERY

## 2023-03-20 PROCEDURE — G8427 DOCREV CUR MEDS BY ELIG CLIN: HCPCS | Performed by: ORTHOPAEDIC SURGERY

## 2023-03-20 PROCEDURE — G8484 FLU IMMUNIZE NO ADMIN: HCPCS | Performed by: ORTHOPAEDIC SURGERY

## 2023-03-20 PROCEDURE — G8417 CALC BMI ABV UP PARAM F/U: HCPCS | Performed by: ORTHOPAEDIC SURGERY

## 2023-03-20 PROCEDURE — 99214 OFFICE O/P EST MOD 30 MIN: CPT | Performed by: ORTHOPAEDIC SURGERY

## 2023-03-20 NOTE — PROGRESS NOTES
Strain: Not on file   Food Insecurity: Not on file   Transportation Needs: Not on file   Physical Activity: Unknown    Days of Exercise per Week: 0 days    Minutes of Exercise per Session: Patient refused   Stress: Not on file   Social Connections: Not on file   Intimate Partner Violence: Not At Risk    Fear of Current or Ex-Partner: No    Emotionally Abused: No    Physically Abused: No    Sexually Abused: No   Housing Stability: Not on file     Family History   Problem Relation Age of Onset    Heart Disease Father     Heart Disease Maternal Uncle     No Known Problems Mother        REVIEW OF SYSTEMS:     General/Constitution:  (-)weight loss, (-)fever, (-)chills, (-)weakness. Skin: (-) rash,(-) psoriasis,(-) eczema, (-)skin cancer. Musculoskeletal: (-) fractures,  (-) dislocations,(-) collagen vascular disease, (-) fibromyalgia, (-) multiple sclerosis, (-) muscular dystrophy, (-) RSD,(-) joint pain (-)swelling, (-) joint pain,swelling. Neurologic: (-) epilepsy, (-)seizures,(-) brain tumor,(-) TIA, (-)stroke, (-)headaches, (-)Parkinson disease,(-) memory loss, (-) LOC. Cardiovascular: (-) Chest pain, (-) swelling in legs/feet, (-) SOB, (-) cramping in legs/feet with walking. Respiratory: (-) SOB, (-) Coughing, (-) night sweats. GI: (-) nausea, (-) vomiting, (-) diarrhea, (-) blood in stool, (-) gastric ulcer. Psychiatric: (-) Depression, (-) Anxiety, (-) bipolar disease, (-) Alzheimer's Disease  Allergic/Immunologic: (-) allergies latex, (-) allergies metal, (-) skin sensitivity. Hematlogic: (-) anemia, (-) blood transfusion, (-) DVT/PE, (-) Clotting disorders      Subjective:  _Temp 98 °F (36.7 °C)   Ht 6' 1\" (1.854 m)   Wt 230 lb (104.3 kg)   BMI 30.34 kg/m²  Vital signs are stable. In general, patient is awake, alert and oriented X3, in no apparent distress. Examination of HENT reveals normocephalic, atraumatic. PERRLA/EOMI sclera are white. Conjunctivae are clear. TM's are intact.   Pharynx is

## 2023-04-03 ENCOUNTER — TELEPHONE (OUTPATIENT)
Dept: ORTHOPEDIC SURGERY | Age: 63
End: 2023-04-03

## 2023-04-03 ENCOUNTER — PREP FOR PROCEDURE (OUTPATIENT)
Dept: ORTHOPEDIC SURGERY | Age: 63
End: 2023-04-03

## 2023-04-03 PROBLEM — M75.42 IMPINGEMENT SYNDROME OF LEFT SHOULDER: Status: ACTIVE | Noted: 2023-04-03

## 2023-04-03 NOTE — TELEPHONE ENCOUNTER
Prior Authorization Form:      DEMOGRAPHICS:                     Patient Name:  Tuan Niño  Patient :  1960            Insurance:  Payor: Pili Hernandez / Plan: Holloway Rota / Product Type: *No Product type* /   Insurance ID Number:    Payer/Plan Subscr  Sex Relation Sub.  Ins. ID Effective Group Num   1. JOB Yeager 1960 Male Self [id number] [address] [facility] [address]         DIAGNOSIS & PROCEDURE:                       Procedure/Operation: LEFT SHOULDER ARTHROSCOPY SUBACROMIAL DECOMPRESSION AND DEBRIDEMENT           CPT Code: 24414    Diagnosis:  LEFT SHOULDER IMPINGEMENT, LABRAL TEAR    ICD10 Code: C85.764H; M75.42    Location:  [facility]    Surgeon:  HARDEEP LONG    SCHEDULING INFORMATION:                          Date: 23    Time: TO FOLLOW              Anesthesia:  General                                                       Status:  Outpatient        Special Comments:  NONE       Electronically signed by Janine Gurrola ATC on 4/3/2023 at 3:19 PM

## 2023-04-06 RX ORDER — CRANBERRY FRUIT EXTRACT 200 MG
CAPSULE ORAL
COMMUNITY

## 2023-04-14 ENCOUNTER — HOSPITAL ENCOUNTER (OUTPATIENT)
Age: 63
Setting detail: OUTPATIENT SURGERY
Discharge: HOME | End: 2023-04-14
Attending: ORTHOPAEDIC SURGERY | Admitting: ORTHOPAEDIC SURGERY
Payer: COMMERCIAL

## 2023-04-14 VITALS
HEIGHT: 73 IN | RESPIRATION RATE: 18 BRPM | TEMPERATURE: 97 F | SYSTOLIC BLOOD PRESSURE: 135 MMHG | HEART RATE: 78 BPM | DIASTOLIC BLOOD PRESSURE: 79 MMHG | BODY MASS INDEX: 31.41 KG/M2 | WEIGHT: 237 LBS | OXYGEN SATURATION: 95 %

## 2023-04-14 DIAGNOSIS — M75.42 IMPINGEMENT SYNDROME OF LEFT SHOULDER: ICD-10-CM

## 2023-04-14 PROCEDURE — 7100000000 HC PACU RECOVERY - FIRST 15 MIN: Performed by: ORTHOPAEDIC SURGERY

## 2023-04-14 PROCEDURE — 2720000010 HC SURG SUPPLY STERILE: Performed by: ORTHOPAEDIC SURGERY

## 2023-04-14 PROCEDURE — 6370000000 HC RX 637 (ALT 250 FOR IP): Performed by: ANESTHESIOLOGY

## 2023-04-14 PROCEDURE — 2580000003 HC RX 258: Performed by: ANESTHESIOLOGY

## 2023-04-14 PROCEDURE — C1776 JOINT DEVICE (IMPLANTABLE): HCPCS | Performed by: ORTHOPAEDIC SURGERY

## 2023-04-14 PROCEDURE — 2580000003 HC RX 258: Performed by: ORTHOPAEDIC SURGERY

## 2023-04-14 PROCEDURE — 3600000004 HC SURGERY LEVEL 4 BASE: Performed by: ORTHOPAEDIC SURGERY

## 2023-04-14 PROCEDURE — 7100000011 HC PHASE II RECOVERY - ADDTL 15 MIN: Performed by: ORTHOPAEDIC SURGERY

## 2023-04-14 PROCEDURE — 29828 SHO ARTHRS SRG BICP TENODSIS: CPT | Performed by: ORTHOPAEDIC SURGERY

## 2023-04-14 PROCEDURE — 7100000001 HC PACU RECOVERY - ADDTL 15 MIN: Performed by: ORTHOPAEDIC SURGERY

## 2023-04-14 PROCEDURE — 3700000000 HC ANESTHESIA ATTENDED CARE: Performed by: ORTHOPAEDIC SURGERY

## 2023-04-14 PROCEDURE — 2500000003 HC RX 250 WO HCPCS: Performed by: ORTHOPAEDIC SURGERY

## 2023-04-14 PROCEDURE — 29826 SHO ARTHRS SRG DECOMPRESSION: CPT | Performed by: ORTHOPAEDIC SURGERY

## 2023-04-14 PROCEDURE — 2709999900 HC NON-CHARGEABLE SUPPLY: Performed by: ORTHOPAEDIC SURGERY

## 2023-04-14 PROCEDURE — 7100000010 HC PHASE II RECOVERY - FIRST 15 MIN: Performed by: ORTHOPAEDIC SURGERY

## 2023-04-14 PROCEDURE — 3600000014 HC SURGERY LEVEL 4 ADDTL 15MIN: Performed by: ORTHOPAEDIC SURGERY

## 2023-04-14 PROCEDURE — 3700000001 HC ADD 15 MINUTES (ANESTHESIA): Performed by: ORTHOPAEDIC SURGERY

## 2023-04-14 PROCEDURE — 6360000002 HC RX W HCPCS: Performed by: ORTHOPAEDIC SURGERY

## 2023-04-14 DEVICE — LOOP N TACK 3.9 TENODESIS SYS: Type: IMPLANTABLE DEVICE | Site: SHOULDER | Status: FUNCTIONAL

## 2023-04-14 RX ORDER — MORPHINE SULFATE 2 MG/ML
1 INJECTION, SOLUTION INTRAMUSCULAR; INTRAVENOUS EVERY 5 MIN PRN
Status: DISCONTINUED | OUTPATIENT
Start: 2023-04-14 | End: 2023-04-14 | Stop reason: HOSPADM

## 2023-04-14 RX ORDER — BUPIVACAINE HYDROCHLORIDE 2.5 MG/ML
INJECTION, SOLUTION EPIDURAL; INFILTRATION; INTRACAUDAL PRN
Status: DISCONTINUED | OUTPATIENT
Start: 2023-04-14 | End: 2023-04-14 | Stop reason: ALTCHOICE

## 2023-04-14 RX ORDER — SODIUM CHLORIDE 0.9 % (FLUSH) 0.9 %
5-40 SYRINGE (ML) INJECTION EVERY 12 HOURS SCHEDULED
Status: DISCONTINUED | OUTPATIENT
Start: 2023-04-14 | End: 2023-04-14 | Stop reason: HOSPADM

## 2023-04-14 RX ORDER — SODIUM CHLORIDE 0.9 % (FLUSH) 0.9 %
5-40 SYRINGE (ML) INJECTION PRN
Status: DISCONTINUED | OUTPATIENT
Start: 2023-04-14 | End: 2023-04-14 | Stop reason: HOSPADM

## 2023-04-14 RX ORDER — MEPERIDINE HYDROCHLORIDE 25 MG/ML
12.5 INJECTION INTRAMUSCULAR; INTRAVENOUS; SUBCUTANEOUS ONCE
Status: DISCONTINUED | OUTPATIENT
Start: 2023-04-14 | End: 2023-04-14 | Stop reason: HOSPADM

## 2023-04-14 RX ORDER — FENTANYL CITRATE 0.05 MG/ML
50 INJECTION, SOLUTION INTRAMUSCULAR; INTRAVENOUS EVERY 5 MIN PRN
Status: DISCONTINUED | OUTPATIENT
Start: 2023-04-14 | End: 2023-04-14 | Stop reason: HOSPADM

## 2023-04-14 RX ORDER — DIPHENHYDRAMINE HYDROCHLORIDE 50 MG/ML
12.5 INJECTION INTRAMUSCULAR; INTRAVENOUS
Status: DISCONTINUED | OUTPATIENT
Start: 2023-04-14 | End: 2023-04-14 | Stop reason: HOSPADM

## 2023-04-14 RX ORDER — PROCHLORPERAZINE EDISYLATE 5 MG/ML
5 INJECTION INTRAMUSCULAR; INTRAVENOUS
Status: DISCONTINUED | OUTPATIENT
Start: 2023-04-14 | End: 2023-04-14 | Stop reason: HOSPADM

## 2023-04-14 RX ORDER — OXYCODONE AND ACETAMINOPHEN 7.5; 325 MG/1; MG/1
1 TABLET ORAL EVERY 6 HOURS PRN
Qty: 28 TABLET | Refills: 0 | Status: SHIPPED | OUTPATIENT
Start: 2023-04-14 | End: 2023-04-21

## 2023-04-14 RX ORDER — SODIUM CHLORIDE, SODIUM LACTATE, POTASSIUM CHLORIDE, CALCIUM CHLORIDE 600; 310; 30; 20 MG/100ML; MG/100ML; MG/100ML; MG/100ML
INJECTION, SOLUTION INTRAVENOUS CONTINUOUS
Status: DISCONTINUED | OUTPATIENT
Start: 2023-04-14 | End: 2023-04-14 | Stop reason: HOSPADM

## 2023-04-14 RX ORDER — SODIUM CHLORIDE 9 MG/ML
INJECTION, SOLUTION INTRAVENOUS PRN
Status: DISCONTINUED | OUTPATIENT
Start: 2023-04-14 | End: 2023-04-14 | Stop reason: HOSPADM

## 2023-04-14 RX ORDER — OXYCODONE HYDROCHLORIDE AND ACETAMINOPHEN 5; 325 MG/1; MG/1
2 TABLET ORAL EVERY 4 HOURS PRN
Status: DISCONTINUED | OUTPATIENT
Start: 2023-04-14 | End: 2023-04-14 | Stop reason: HOSPADM

## 2023-04-14 RX ADMIN — OXYCODONE AND ACETAMINOPHEN 2 TABLET: 5; 325 TABLET ORAL at 11:55

## 2023-04-14 RX ADMIN — CEFAZOLIN 2000 MG: 2 INJECTION, POWDER, FOR SOLUTION INTRAMUSCULAR; INTRAVENOUS at 08:59

## 2023-04-14 RX ADMIN — SODIUM CHLORIDE, POTASSIUM CHLORIDE, SODIUM LACTATE AND CALCIUM CHLORIDE: 600; 310; 30; 20 INJECTION, SOLUTION INTRAVENOUS at 07:47

## 2023-04-14 ASSESSMENT — PAIN - FUNCTIONAL ASSESSMENT
PAIN_FUNCTIONAL_ASSESSMENT: PREVENTS OR INTERFERES SOME ACTIVE ACTIVITIES AND ADLS
PAIN_FUNCTIONAL_ASSESSMENT: 0-10

## 2023-04-14 ASSESSMENT — PAIN DESCRIPTION - LOCATION: LOCATION: SHOULDER

## 2023-04-14 ASSESSMENT — PAIN DESCRIPTION - DESCRIPTORS
DESCRIPTORS: ACHING;BURNING;NAGGING
DESCRIPTORS: ACHING

## 2023-04-14 ASSESSMENT — PAIN DESCRIPTION - ORIENTATION: ORIENTATION: LEFT

## 2023-04-14 ASSESSMENT — PAIN SCALES - GENERAL
PAINLEVEL_OUTOF10: 5
PAINLEVEL_OUTOF10: 5

## 2023-04-14 NOTE — DISCHARGE INSTRUCTIONS
questions about a medical condition or this instruction, always ask your healthcare professional. [place] [address] any warranty or liability for your use of this information.

## 2023-04-14 NOTE — H&P
Natural history and expected course discussed. Questions answered. Educational material distributed. I had a lengthy discussion with the patient regarding their diagnosis. I explained treatment options including surgical vs non surgical treatment. I reviewed in detail the risks and benefits and outlined the procedure in detail with expected outcomes and possible complications. I also discussed non surgical treatment such as injections (CSI and visco supplementation), physical therapy, topical creams and NSAID's. They have elected for surgical  management at this time. I discussed the risks and benefits of the shoulder arthroscopy with the patient. The risks include but are not limited to: infection, injuries to blood vessels and nerves, non relief of symptoms, intraoperative fracture, need for further operative intervention, blood loss, arthrofibrosis of shoulder, DVT/PE, MI and death. The patient understands these risks and wishes to proceed with surgery. I will perform a Left shoulder arthroscopy, SAD and debridement.

## 2023-04-14 NOTE — OP NOTE
Operative Note      Patient: Darlin Fernandez  YOB: 1960  MRN: [id number]    Date of Procedure: 4/14/2023    Pre-Op Diagnosis Codes:     * Biceps tendon rupture [G78.114G]     * Impingement syndrome of left shoulder [M75.42]    Post-Op Diagnosis: Same       Procedure(s):  LEFT SHOULDER ARTHROSCOPY, TENDOESIS, SUBACROMIAL DECOMPRESSION AND DEBRIDEMENT    Surgeon(s): Yossi Kebede DO    Assistant:   Resident: Ashish Leiva DO; Jenifer Leger DO    Anesthesia: General    Estimated Blood Loss (mL): less than 426     Complications: None    Specimens:   * No specimens in log *    Implants:  Implant Name Type Inv. Item Serial No.  Lot No. LRB No. Used Action   LOOP N TACK 3.9 TENODESIS SYS - [id number]  LOOP N TACK 3.9 TENODESIS SYS  [id number] Left 1 Implanted         Drains: * No LDAs found *    Findings: as above      Detailed Description of Procedure:   Below    SURGEON: ALEXANDRA LONG D.O.   ASSISTANT: None. PREOPERATIVE DIAGNOSIS: (1) Subacromial impingement, left shoulder (2) rotator cuff tear  POSTOPERATIVE DIAGNOSES: (1) Subacromial impingement, leftshoulder. (2)   Partial thickness rotator cuff tear. (3) Anterior and posterior labral tear. (4) biceps tear  OPERATION: left shoulder arthroscopy with subacromial arch decompression.   (2) Labral debridement. (3) Debridement of partial thickness rotator cuff (4) biceps tenodesis  tear   ANESTHESIA: General.   ESTIMATED BLOOD LOSS: Minimal.   COMPLICATIONS: None. OPERATIVE PROCEDURE: The patient was taken to the operative suite and was   given a general anesthesia. The patient was positioned in the lateral   decubitus position with a beanbag and then prepped and draped the arm in a   sterile fashion. I outlined an incision along the lateral left shoulder. Hung 10 pounds of   traction from theleft arm, outlined the incisions along the acromial border,   1 posterolateral and lateral, and 1 anterior.  I placed a

## 2023-04-25 ENCOUNTER — TELEPHONE (OUTPATIENT)
Dept: ORTHOPEDIC SURGERY | Age: 63
End: 2023-04-25

## 2023-04-25 DIAGNOSIS — M75.42 IMPINGEMENT SYNDROME OF LEFT SHOULDER: ICD-10-CM

## 2023-04-25 RX ORDER — OXYCODONE AND ACETAMINOPHEN 7.5; 325 MG/1; MG/1
1 TABLET ORAL EVERY 6 HOURS PRN
Qty: 28 TABLET | Refills: 0 | Status: SHIPPED | OUTPATIENT
Start: 2023-04-25 | End: 2023-05-02

## 2023-04-26 DIAGNOSIS — S46.012A TRAUMATIC COMPLETE TEAR OF LEFT ROTATOR CUFF, INITIAL ENCOUNTER: Primary | ICD-10-CM

## 2023-05-01 ENCOUNTER — NURSE ONLY (OUTPATIENT)
Dept: ORTHOPEDIC SURGERY | Age: 63
End: 2023-05-01

## 2023-05-01 VITALS — WEIGHT: 237 LBS | BODY MASS INDEX: 31.41 KG/M2 | TEMPERATURE: 98 F | HEIGHT: 73 IN

## 2023-05-31 DIAGNOSIS — M25.812 SHOULDER IMPINGEMENT, LEFT: Primary | ICD-10-CM

## 2023-06-01 ENCOUNTER — OFFICE VISIT (OUTPATIENT)
Dept: ORTHOPEDIC SURGERY | Age: 63
End: 2023-06-01

## 2023-06-01 VITALS — WEIGHT: 237 LBS | TEMPERATURE: 98 F | HEIGHT: 73 IN | BODY MASS INDEX: 31.41 KG/M2

## 2023-06-01 DIAGNOSIS — M25.812 SHOULDER IMPINGEMENT, LEFT: ICD-10-CM

## 2023-06-01 DIAGNOSIS — S46.219A BICEPS TENDON TEAR: Primary | ICD-10-CM

## 2023-06-01 PROCEDURE — 99024 POSTOP FOLLOW-UP VISIT: CPT | Performed by: NURSE PRACTITIONER

## 2023-06-01 NOTE — PROGRESS NOTES
Gordy Hough is here for post op visit after bicep tenodesis repair and shoulder arthroscopy. The patient is post op week 6. The patient is  doing well. Physical exam:  The wounds are clean, dry, no drainage. Range of motion: diminished range of motion. He has intact motor and sensory functions, grossly intact in the median, ulnar, radial, musculocutaneous, and axillary nerve distributions. He has bounding pulses in the wrist.  Capillary refill is less than 2 seconds in all digits. Impression:  {  Encounter Diagnoses   Name Primary? Biceps tendon tear Yes    Shoulder impingement, left        Plan:    The patient will now D/C the sling. He will continue range of motion at the elbow, wrist and hand and initiate physical therapy. He will continue to use analgesics to control the pain.       I will see them back in 2 months

## 2023-06-05 ENCOUNTER — EVALUATION (OUTPATIENT)
Dept: PHYSICAL THERAPY | Age: 63
End: 2023-06-05

## 2023-06-05 DIAGNOSIS — S46.219A BICEPS TENDON TEAR: Primary | ICD-10-CM

## 2023-06-05 DIAGNOSIS — M25.812 SHOULDER IMPINGEMENT, LEFT: ICD-10-CM

## 2023-06-08 ENCOUNTER — TREATMENT (OUTPATIENT)
Dept: PHYSICAL THERAPY | Age: 63
End: 2023-06-08

## 2023-06-08 DIAGNOSIS — S46.219A BICEPS TENDON TEAR: Primary | ICD-10-CM

## 2023-06-08 DIAGNOSIS — M25.812 SHOULDER IMPINGEMENT, LEFT: ICD-10-CM

## 2023-06-08 NOTE — PROGRESS NOTES
Physical Therapy Daily Treatment Note    Date: 2023  Patient Name: Carolyn Dickens  : 1960   MRN: [id number]  DOInjury: 2021  DOSx: 2023   Referring Provider: Franci Oconnell, [address] [place] [address]     Medical Diagnosis:   1. Biceps tendon tear          2. Shoulder impingement, left       OPERATION: left shoulder arthroscopy with subacromial arch decompression.   (2) Labral debridement. (3) Debridement of partial thickness rotator cuff (4) biceps tenodesis      Patient is 6 weeks post biceps tenodesis , rotator cuff debridement , SAD, labral debridement. Patient is limited in AROM and strength. Treatment will be AAROM, AROM, wand exercises, and strengthening. Additional info: He reports bilateral neuropathy and B UE dysfunction along with shoulder problems. EMG 23 notes C6 cervical radiculopathy and L ulnar neuropathy at the elbow. Additional UE strength testing:  L: Delts 3-/5, Triceps 2/5, Biceps 3/5, Supination 3+/5, Wrist flexion 3/5, Wrist extension 4/5. Outcome Measure:   QuickDASH (Disorders of the Arm, Shoulder, and Hand) 97% disability    Access Code: [email or web address]  URL: [email or web address]  Date: 2023  Prepared by: Alicia Medina    Exercises  - Supine Shoulder Press  - 2 x daily - 3 sets - 10 reps  - Supine Shoulder Flexion with Dowel  - 2 x daily - 3 sets - 10 reps  - Supine Shoulder External Rotation with Dowel  - 2 x daily - 3 sets - 10 reps  - Standing Shoulder Internal Rotation AAROM with Dowel (Mirrored)  - 2 x daily - 3 sets - 10 reps    X = TO BE PERFORMED NEXT VISIT  > = PROGRESS TO THIS    S: pt reports doing well still sore  O:    Time 4654-0427     Visit 2/ see below     Pain 6-7/10     ROM Right Shoulder:  AROM: 140° Forward elevation,  45° ER,  IR to T8  PROM: 170° Forward elevation,  90° ER,  50° IR     Left Shoulder:  AROM: 120° Forward elevation,  45° ER,  IR to T10  Use of accessory muscles to aid elevation  PROM:

## 2023-06-20 ENCOUNTER — TREATMENT (OUTPATIENT)
Dept: PHYSICAL THERAPY | Age: 63
End: 2023-06-20

## 2023-06-20 DIAGNOSIS — M25.812 SHOULDER IMPINGEMENT, LEFT: ICD-10-CM

## 2023-06-20 DIAGNOSIS — S46.219A BICEPS TENDON TEAR: Primary | ICD-10-CM

## 2023-06-20 NOTE — PROGRESS NOTES
Physical Therapy Daily Treatment Note    Date: 2023  Patient Name: Dahlia Mccarthy  : 1960   MRN: [id number]  DOInjury: 2021  DOSx: 2023     Referring Provider:   Kirby Macdonald, [address] [place] [address]       Medical Diagnosis:   1. Biceps tendon tear          2. Shoulder impingement, left       OPERATION: left shoulder arthroscopy with subacromial arch decompression.   (2) Labral debridement. (3) Debridement of partial thickness rotator cuff (4) biceps tenodesis    Patient is 6 weeks post biceps tenodesis, rotator cuff debridement, SAD, labral debridement. Patient is limited in AROM and strength. Treatment will be AAROM, AROM, wand exercises, and strengthening. Additional info: He reports bilateral neuropathy and B UE dysfunction along with shoulder problems. EMG 23 notes C6 cervical radiculopathy and L ulnar neuropathy at the elbow. Additional UE strength testing:  L: Delts 3-/5, Triceps 2/5, Biceps 3/5, Supination 3+/5, Wrist flexion 3/5, Wrist extension 4/5. Outcome Measure:   QuickDASH (Disorders of the Arm, Shoulder, and Hand) 97% disability    Access Code: [email or web address]  URL: [email or web address]  Date: 2023  Prepared by: Kari Camarena    Exercises  - Supine Shoulder Press  - 2 x daily - 3 sets - 10 reps  - Supine Shoulder Flexion with Dowel  - 2 x daily - 3 sets - 10 reps  - Supine Shoulder External Rotation with Dowel  - 2 x daily - 3 sets - 10 reps  - Standing Shoulder Internal Rotation AAROM with Dowel (Mirrored)  - 2 x daily - 3 sets - 10 reps    X = TO BE PERFORMED NEXT VISIT  > = PROGRESS TO THIS    S: Patient reports he has \"too much to do\" where his back is \"all jacked up\" because he has been doing a lot of compensating to get things done.    O:    Time 8411-9551     Visit 5/see below     Pain 6-7/10     ROM Right Shoulder:  AROM: 140° Forward elevation,  45° ER,  IR to T8  PROM: 170° Forward elevation,  90° ER,  50° IR     Left

## 2023-06-22 ENCOUNTER — TREATMENT (OUTPATIENT)
Dept: PHYSICAL THERAPY | Age: 63
End: 2023-06-22

## 2023-06-22 DIAGNOSIS — S46.219A BICEPS TENDON TEAR: Primary | ICD-10-CM

## 2023-06-22 DIAGNOSIS — M25.812 SHOULDER IMPINGEMENT, LEFT: ICD-10-CM

## 2023-06-22 NOTE — PROGRESS NOTES
accessory muscles to aid elevation  PROM: 150° Forward elevation,  90° ER , 45° IR     Modalities       Use sparingly if at all. Prefer an active program.  MO   Manual      Stretching all directions   MT         Stretch      Table slides Added to HEP, review with patient next visit     Wall Flexion ? TE   IR stretch behind back ? TE      TE   Exercise      Pulleys - flex 4 min  TE   Pulleys-IR 3 min  TE   Supine wand chest press 2 x 15  TE   Supine wand flex 2 x 15  TE   Supine wand ER/IR (goal post) 3 x 10  TE   Standing wand behind back IR 3 x 10  TE   Supine flexion 3 x 10  TE   S-lying ER 3 x 10  TE   Standing wand flex 3 x 10  TE    seated flexion 3 x 10 In front of mirror TE   ROWS: H   TA   ROWS: M   TA   ROWS: L   TA   ER (towel roll under arm)   TE   IR (towel roll under arm)   TE   Flexion in scapular plane      Shelf Lift      Shoulder press      Biceps curl      Triceps press down                  A: Tolerated well. Pt requested to do seated exercises first because he feels he fatigues too quickly throughout the session to do them at the end. Pt declined to do seated flexion in front of mirror. Due to increased pain no additional exs. Discussed anatomy, physiology, body mechanics, principles of loading, and progressive loading/activity. Feedback and cues necessary for developing neuromuscular control. Movement education and guided movement interventions such as verbal and tactile cues used to improve performance and control. Reviewed home exercise program extensively; written copy provided.     P: Continue with rehab plan  [place], [place]   29679-28 units  24065-95 units  14910- 10/36 units  6/6/2023 through 9/29/2023  Treatment Charges: Mins Units   Initial Evaluation     Re-Evaluation     Ther Exercise         TE     Manual Therapy     MT     Ther Activities        TA 25 2   Gait Training          GT     Neuro Re-education NR     Modalities     Non-Billable Service

## 2023-06-27 ENCOUNTER — TREATMENT (OUTPATIENT)
Dept: PHYSICAL THERAPY | Age: 63
End: 2023-06-27
Payer: COMMERCIAL

## 2023-06-27 DIAGNOSIS — M25.812 SHOULDER IMPINGEMENT, LEFT: ICD-10-CM

## 2023-06-27 DIAGNOSIS — S46.219A BICEPS TENDON TEAR: Primary | ICD-10-CM

## 2023-06-27 PROCEDURE — 97530 THERAPEUTIC ACTIVITIES: CPT

## 2023-06-29 ENCOUNTER — TREATMENT (OUTPATIENT)
Dept: PHYSICAL THERAPY | Age: 63
End: 2023-06-29

## 2023-06-29 DIAGNOSIS — S46.219A BICEPS TENDON TEAR: Primary | ICD-10-CM

## 2023-06-29 DIAGNOSIS — M25.812 SHOULDER IMPINGEMENT, LEFT: ICD-10-CM

## 2023-07-05 ENCOUNTER — TREATMENT (OUTPATIENT)
Dept: PHYSICAL THERAPY | Age: 63
End: 2023-07-05

## 2023-07-05 DIAGNOSIS — M25.812 SHOULDER IMPINGEMENT, LEFT: ICD-10-CM

## 2023-07-05 DIAGNOSIS — S46.219A BICEPS TENDON TEAR: Primary | ICD-10-CM

## 2023-07-07 ENCOUNTER — TREATMENT (OUTPATIENT)
Dept: PHYSICAL THERAPY | Age: 63
End: 2023-07-07

## 2023-07-07 DIAGNOSIS — S46.219A BICEPS TENDON TEAR: Primary | ICD-10-CM

## 2023-07-07 DIAGNOSIS — M25.812 SHOULDER IMPINGEMENT, LEFT: ICD-10-CM

## 2023-07-11 ENCOUNTER — TREATMENT (OUTPATIENT)
Dept: PHYSICAL THERAPY | Age: 63
End: 2023-07-11
Payer: COMMERCIAL

## 2023-07-11 DIAGNOSIS — S46.219A BICEPS TENDON TEAR: Primary | ICD-10-CM

## 2023-07-11 DIAGNOSIS — M25.812 SHOULDER IMPINGEMENT, LEFT: ICD-10-CM

## 2023-07-11 PROCEDURE — 97530 THERAPEUTIC ACTIVITIES: CPT

## 2023-07-11 PROCEDURE — 97110 THERAPEUTIC EXERCISES: CPT

## 2023-07-13 ENCOUNTER — TREATMENT (OUTPATIENT)
Dept: PHYSICAL THERAPY | Age: 63
End: 2023-07-13

## 2023-07-13 DIAGNOSIS — M25.812 SHOULDER IMPINGEMENT, LEFT: ICD-10-CM

## 2023-07-13 DIAGNOSIS — S46.219A BICEPS TENDON TEAR: Primary | ICD-10-CM

## 2023-07-17 ENCOUNTER — TREATMENT (OUTPATIENT)
Dept: PHYSICAL THERAPY | Age: 63
End: 2023-07-17
Payer: COMMERCIAL

## 2023-07-17 DIAGNOSIS — M25.812 SHOULDER IMPINGEMENT, LEFT: ICD-10-CM

## 2023-07-17 DIAGNOSIS — S46.219A BICEPS TENDON TEAR: Primary | ICD-10-CM

## 2023-07-17 PROCEDURE — 97110 THERAPEUTIC EXERCISES: CPT

## 2023-07-17 PROCEDURE — 97530 THERAPEUTIC ACTIVITIES: CPT

## 2023-07-20 ENCOUNTER — TREATMENT (OUTPATIENT)
Dept: PHYSICAL THERAPY | Age: 63
End: 2023-07-20

## 2023-07-20 DIAGNOSIS — M25.812 SHOULDER IMPINGEMENT, LEFT: ICD-10-CM

## 2023-07-20 DIAGNOSIS — S46.219A BICEPS TENDON TEAR: Primary | ICD-10-CM

## 2023-07-25 ENCOUNTER — TREATMENT (OUTPATIENT)
Dept: PHYSICAL THERAPY | Age: 63
End: 2023-07-25
Payer: COMMERCIAL

## 2023-07-25 DIAGNOSIS — M25.812 SHOULDER IMPINGEMENT, LEFT: ICD-10-CM

## 2023-07-25 DIAGNOSIS — S46.219A BICEPS TENDON TEAR: Primary | ICD-10-CM

## 2023-07-25 PROCEDURE — 97530 THERAPEUTIC ACTIVITIES: CPT

## 2023-07-27 ENCOUNTER — TREATMENT (OUTPATIENT)
Dept: PHYSICAL THERAPY | Age: 63
End: 2023-07-27
Payer: COMMERCIAL

## 2023-07-27 DIAGNOSIS — M25.812 SHOULDER IMPINGEMENT, LEFT: ICD-10-CM

## 2023-07-27 DIAGNOSIS — S46.219A BICEPS TENDON TEAR: Primary | ICD-10-CM

## 2023-07-27 PROCEDURE — 97110 THERAPEUTIC EXERCISES: CPT

## 2023-07-27 PROCEDURE — 97530 THERAPEUTIC ACTIVITIES: CPT

## 2023-08-01 ENCOUNTER — OFFICE VISIT (OUTPATIENT)
Dept: ORTHOPEDIC SURGERY | Age: 63
End: 2023-08-01
Payer: COMMERCIAL

## 2023-08-01 ENCOUNTER — TREATMENT (OUTPATIENT)
Dept: PHYSICAL THERAPY | Age: 63
End: 2023-08-01
Payer: COMMERCIAL

## 2023-08-01 VITALS — HEIGHT: 73 IN | WEIGHT: 237 LBS | BODY MASS INDEX: 31.41 KG/M2

## 2023-08-01 DIAGNOSIS — S46.219A BICEPS TENDON TEAR: Primary | ICD-10-CM

## 2023-08-01 DIAGNOSIS — M25.812 SHOULDER IMPINGEMENT, LEFT: ICD-10-CM

## 2023-08-01 PROCEDURE — 3017F COLORECTAL CA SCREEN DOC REV: CPT | Performed by: ORTHOPAEDIC SURGERY

## 2023-08-01 PROCEDURE — G8417 CALC BMI ABV UP PARAM F/U: HCPCS | Performed by: ORTHOPAEDIC SURGERY

## 2023-08-01 PROCEDURE — 99213 OFFICE O/P EST LOW 20 MIN: CPT | Performed by: ORTHOPAEDIC SURGERY

## 2023-08-01 PROCEDURE — 97110 THERAPEUTIC EXERCISES: CPT

## 2023-08-01 PROCEDURE — 1036F TOBACCO NON-USER: CPT | Performed by: ORTHOPAEDIC SURGERY

## 2023-08-01 PROCEDURE — G8427 DOCREV CUR MEDS BY ELIG CLIN: HCPCS | Performed by: ORTHOPAEDIC SURGERY

## 2023-08-01 PROCEDURE — 97530 THERAPEUTIC ACTIVITIES: CPT

## 2023-08-01 NOTE — PROGRESS NOTES
Chief Complaint   Patient presents with    Shoulder Pain     Left shoulder jk    Follow-up        Martin Woods is a 58y.o. year old   male who is seen today for follow up of his left shoulder arthroscopy 4/14/23. Patient continues to work on strengthening and ROM. Chief Complaint   Patient presents with    Shoulder Pain     Left shoulder jk    Follow-up     Past Medical History:   Diagnosis Date    Acid reflux     GERD (gastroesophageal reflux disease)     Hyperlipidemia     Hypertension     Hypokalemia     Hypomagnesemia     Low back pain      Past Surgical History:   Procedure Laterality Date    ANESTHESIA NERVE BLOCK Right 01/14/2020    RIGHT L2,3,4 MEDIAL BRANCH BLOCK WITHOUT SEDATION (CPT 89904,05130) performed by Toni Sánchez MD at [address] Right 02/20/2020    RIGHT L2,3,4 MEDIAL BRANCH BLOCK (CPT 98669,85147) performed by Toni Sánchez MD at [address] Left 06/29/2020    LEFT L2 L3 L4 MEDIAL BRANCH BLOCK (CPT 07153) ([facility] APPOINTMENT) performed by Toni Sánchez MD at [address] [place] [address]      May 4, 2018 decompression with Dr. Arielle Allison.      CARPAL TUNNEL RELEASE Left 01/24/2014    CARPAL TUNNEL RELEASE      left    CERVICAL FUSION  11/01/2021    COLONOSCOPY      FRACTURE SURGERY      arm    FRACTURE SURGERY      left arm    HERNIA REPAIR      HERNIA REPAIR      x2 child    KNEE ARTHROSCOPY Left 02/20/2015    NERVE BLOCK Right 01/14/2020    NERVE BLOCK Right 02/20/2020    L2,3,4 medial     NERVE BLOCK Right 05/04/2020    L2,3,4,5 medial radiofrequency     NERVE BLOCK Left 06/29/2020    medial branch block    PAIN MANAGEMENT PROCEDURE Right 05/04/2020    RIGHT L2, 3, 4 MEDIAL BRANCH L5 DORSALRAMUS RADIOFREQUENCY ABLATION performed by Toni Sánchez MD at [address] [facility] ARTHROSCOPY Left 4/14/2023    LEFT SHOULDER ARTHROSCOPY, TENDOESIS, SUBACROMIAL DECOMPRESSION AND DEBRIDEMENT performed by Josee Otero

## 2023-08-03 ENCOUNTER — TREATMENT (OUTPATIENT)
Dept: PHYSICAL THERAPY | Age: 63
End: 2023-08-03

## 2023-08-03 DIAGNOSIS — M25.812 SHOULDER IMPINGEMENT, LEFT: ICD-10-CM

## 2023-08-03 DIAGNOSIS — S46.219A BICEPS TENDON TEAR: Primary | ICD-10-CM

## 2023-08-08 ENCOUNTER — TREATMENT (OUTPATIENT)
Dept: PHYSICAL THERAPY | Age: 63
End: 2023-08-08
Payer: COMMERCIAL

## 2023-08-08 DIAGNOSIS — S46.219A BICEPS TENDON TEAR: Primary | ICD-10-CM

## 2023-08-08 PROCEDURE — 97530 THERAPEUTIC ACTIVITIES: CPT

## 2023-08-08 PROCEDURE — 97110 THERAPEUTIC EXERCISES: CPT

## 2023-08-10 ENCOUNTER — TREATMENT (OUTPATIENT)
Dept: PHYSICAL THERAPY | Age: 63
End: 2023-08-10

## 2023-08-10 DIAGNOSIS — S46.219A BICEPS TENDON TEAR: Primary | ICD-10-CM

## 2023-08-10 DIAGNOSIS — M25.812 SHOULDER IMPINGEMENT, LEFT: ICD-10-CM

## 2023-08-15 ENCOUNTER — TREATMENT (OUTPATIENT)
Dept: PHYSICAL THERAPY | Age: 63
End: 2023-08-15
Payer: COMMERCIAL

## 2023-08-15 DIAGNOSIS — M25.812 SHOULDER IMPINGEMENT, LEFT: ICD-10-CM

## 2023-08-15 DIAGNOSIS — S46.219A BICEPS TENDON TEAR: Primary | ICD-10-CM

## 2023-08-15 PROCEDURE — 97530 THERAPEUTIC ACTIVITIES: CPT

## 2023-08-17 ENCOUNTER — TREATMENT (OUTPATIENT)
Dept: PHYSICAL THERAPY | Age: 63
End: 2023-08-17

## 2023-08-17 DIAGNOSIS — M25.812 SHOULDER IMPINGEMENT, LEFT: ICD-10-CM

## 2023-08-17 DIAGNOSIS — S46.219A BICEPS TENDON TEAR: Primary | ICD-10-CM

## 2023-08-22 ENCOUNTER — TREATMENT (OUTPATIENT)
Dept: PHYSICAL THERAPY | Age: 63
End: 2023-08-22
Payer: COMMERCIAL

## 2023-08-22 DIAGNOSIS — M25.812 SHOULDER IMPINGEMENT, LEFT: ICD-10-CM

## 2023-08-22 DIAGNOSIS — S46.219A BICEPS TENDON TEAR: Primary | ICD-10-CM

## 2023-08-22 PROCEDURE — 97530 THERAPEUTIC ACTIVITIES: CPT

## 2023-08-22 PROCEDURE — 97110 THERAPEUTIC EXERCISES: CPT

## 2023-08-25 ENCOUNTER — TREATMENT (OUTPATIENT)
Dept: PHYSICAL THERAPY | Age: 63
End: 2023-08-25

## 2023-08-25 DIAGNOSIS — M25.812 SHOULDER IMPINGEMENT, LEFT: ICD-10-CM

## 2023-08-25 DIAGNOSIS — S46.219A BICEPS TENDON TEAR: Primary | ICD-10-CM

## 2023-08-29 ENCOUNTER — TREATMENT (OUTPATIENT)
Dept: PHYSICAL THERAPY | Age: 63
End: 2023-08-29
Payer: COMMERCIAL

## 2023-08-29 DIAGNOSIS — M25.812 SHOULDER IMPINGEMENT, LEFT: ICD-10-CM

## 2023-08-29 DIAGNOSIS — S46.219A BICEPS TENDON TEAR: Primary | ICD-10-CM

## 2023-08-29 PROCEDURE — 97110 THERAPEUTIC EXERCISES: CPT

## 2023-08-29 PROCEDURE — 97530 THERAPEUTIC ACTIVITIES: CPT

## 2023-08-31 ENCOUNTER — TREATMENT (OUTPATIENT)
Dept: PHYSICAL THERAPY | Age: 63
End: 2023-08-31

## 2023-08-31 DIAGNOSIS — M25.812 SHOULDER IMPINGEMENT, LEFT: ICD-10-CM

## 2023-08-31 DIAGNOSIS — S46.219A BICEPS TENDON TEAR: Primary | ICD-10-CM

## 2023-09-06 ENCOUNTER — TREATMENT (OUTPATIENT)
Dept: PHYSICAL THERAPY | Age: 63
End: 2023-09-06

## 2023-09-06 DIAGNOSIS — S46.219A BICEPS TENDON TEAR: Primary | ICD-10-CM

## 2023-09-06 DIAGNOSIS — M25.812 SHOULDER IMPINGEMENT, LEFT: ICD-10-CM

## 2023-09-08 ENCOUNTER — TREATMENT (OUTPATIENT)
Dept: PHYSICAL THERAPY | Age: 63
End: 2023-09-08

## 2023-09-08 DIAGNOSIS — M25.812 SHOULDER IMPINGEMENT, LEFT: ICD-10-CM

## 2023-09-08 DIAGNOSIS — S46.219A BICEPS TENDON TEAR: Primary | ICD-10-CM

## 2023-09-12 ENCOUNTER — TREATMENT (OUTPATIENT)
Dept: PHYSICAL THERAPY | Age: 63
End: 2023-09-12
Payer: COMMERCIAL

## 2023-09-12 DIAGNOSIS — S46.219A BICEPS TENDON TEAR: Primary | ICD-10-CM

## 2023-09-12 DIAGNOSIS — M25.812 SHOULDER IMPINGEMENT, LEFT: ICD-10-CM

## 2023-09-12 PROCEDURE — 97110 THERAPEUTIC EXERCISES: CPT

## 2023-09-14 ENCOUNTER — TREATMENT (OUTPATIENT)
Dept: PHYSICAL THERAPY | Age: 63
End: 2023-09-14

## 2023-09-14 DIAGNOSIS — M25.812 SHOULDER IMPINGEMENT, LEFT: ICD-10-CM

## 2023-09-14 DIAGNOSIS — S46.219A BICEPS TENDON TEAR: Primary | ICD-10-CM

## 2023-09-22 ENCOUNTER — TREATMENT (OUTPATIENT)
Dept: PHYSICAL THERAPY | Age: 63
End: 2023-09-22

## 2023-09-22 DIAGNOSIS — S46.219A BICEPS TENDON TEAR: Primary | ICD-10-CM

## 2023-09-22 DIAGNOSIS — M25.812 SHOULDER IMPINGEMENT, LEFT: ICD-10-CM

## 2023-09-26 ENCOUNTER — TREATMENT (OUTPATIENT)
Dept: PHYSICAL THERAPY | Age: 63
End: 2023-09-26
Payer: COMMERCIAL

## 2023-09-26 DIAGNOSIS — M25.812 SHOULDER IMPINGEMENT, LEFT: ICD-10-CM

## 2023-09-26 DIAGNOSIS — S46.219A BICEPS TENDON TEAR: Primary | ICD-10-CM

## 2023-09-26 PROCEDURE — 97110 THERAPEUTIC EXERCISES: CPT

## 2023-09-26 PROCEDURE — 97530 THERAPEUTIC ACTIVITIES: CPT

## 2023-09-28 ENCOUNTER — TREATMENT (OUTPATIENT)
Dept: PHYSICAL THERAPY | Age: 63
End: 2023-09-28
Payer: COMMERCIAL

## 2023-09-28 DIAGNOSIS — M25.812 SHOULDER IMPINGEMENT, LEFT: ICD-10-CM

## 2023-09-28 DIAGNOSIS — S46.219A BICEPS TENDON TEAR: Primary | ICD-10-CM

## 2023-09-28 PROCEDURE — 97530 THERAPEUTIC ACTIVITIES: CPT

## 2023-09-28 PROCEDURE — 97110 THERAPEUTIC EXERCISES: CPT

## 2023-10-02 ENCOUNTER — OFFICE VISIT (OUTPATIENT)
Dept: ORTHOPEDIC SURGERY | Age: 63
End: 2023-10-02
Payer: COMMERCIAL

## 2023-10-02 VITALS — TEMPERATURE: 98 F | WEIGHT: 240 LBS | BODY MASS INDEX: 30.8 KG/M2 | HEIGHT: 74 IN

## 2023-10-02 DIAGNOSIS — S46.012A TRAUMATIC COMPLETE TEAR OF LEFT ROTATOR CUFF, INITIAL ENCOUNTER: Primary | ICD-10-CM

## 2023-10-02 PROCEDURE — 3017F COLORECTAL CA SCREEN DOC REV: CPT | Performed by: ORTHOPAEDIC SURGERY

## 2023-10-02 PROCEDURE — G8484 FLU IMMUNIZE NO ADMIN: HCPCS | Performed by: ORTHOPAEDIC SURGERY

## 2023-10-02 PROCEDURE — G8427 DOCREV CUR MEDS BY ELIG CLIN: HCPCS | Performed by: ORTHOPAEDIC SURGERY

## 2023-10-02 PROCEDURE — 99213 OFFICE O/P EST LOW 20 MIN: CPT | Performed by: ORTHOPAEDIC SURGERY

## 2023-10-02 PROCEDURE — G8417 CALC BMI ABV UP PARAM F/U: HCPCS | Performed by: ORTHOPAEDIC SURGERY

## 2023-10-02 PROCEDURE — 1036F TOBACCO NON-USER: CPT | Performed by: ORTHOPAEDIC SURGERY

## 2023-10-02 RX ORDER — DIAZEPAM 5 MG/1
5 TABLET ORAL PRN
Qty: 2 TABLET | Refills: 0 | Status: SHIPPED | OUTPATIENT
Start: 2023-10-02 | End: 2023-10-03

## 2023-10-02 NOTE — PROGRESS NOTES
Chief Complaint   Patient presents with    Shoulder Pain     Left shoulder 2 month f/u, has arthroscopy done 4/14/23 still having pain. Niecy Vallejo is a 58y.o. year old   male who is seen today for follow up of his left shoulder arthroscopy 4/14/23. He continues to have pain and feels weak in the arm. Chief Complaint   Patient presents with    Shoulder Pain     Left shoulder 2 month f/u, has arthroscopy done 4/14/23 still having pain. Past Medical History:   Diagnosis Date    Acid reflux     GERD (gastroesophageal reflux disease)     Hyperlipidemia     Hypertension     Hypokalemia     Hypomagnesemia     Low back pain      Past Surgical History:   Procedure Laterality Date    ANESTHESIA NERVE BLOCK Right 01/14/2020    RIGHT L2,3,4 MEDIAL BRANCH BLOCK WITHOUT SEDATION (CPT 44356,61584) performed by Savannah Biggs MD at [address] Right 02/20/2020    RIGHT L2,3,4 MEDIAL BRANCH BLOCK (CPT 67164,07115) performed by Savannah Biggs MD at [address] Left 06/29/2020    LEFT L2 L3 L4 MEDIAL BRANCH BLOCK (CPT 81548) ([facility] APPOINTMENT) performed by Savannah Biggs MD at [address] [place] [address]      May 4, 2018 decompression with Dr. Alexandr Baker.      CARPAL TUNNEL RELEASE Left 01/24/2014    CARPAL TUNNEL RELEASE      left    CERVICAL FUSION  11/01/2021    COLONOSCOPY      FRACTURE SURGERY      arm    FRACTURE SURGERY      left arm    HERNIA REPAIR      HERNIA REPAIR      x2 child    KNEE ARTHROSCOPY Left 02/20/2015    NERVE BLOCK Right 01/14/2020    NERVE BLOCK Right 02/20/2020    L2,3,4 medial     NERVE BLOCK Right 05/04/2020    L2,3,4,5 medial radiofrequency     NERVE BLOCK Left 06/29/2020    medial branch block    PAIN MANAGEMENT PROCEDURE Right 05/04/2020    RIGHT L2, 3, 4 MEDIAL BRANCH L5 DORSALRAMUS RADIOFREQUENCY ABLATION performed by Savannah Biggs MD at [address] [facility] ARTHROSCOPY Left 4/14/2023    LEFT

## 2023-10-26 ENCOUNTER — OFFICE VISIT (OUTPATIENT)
Dept: ORTHOPEDIC SURGERY | Age: 63
End: 2023-10-26
Payer: COMMERCIAL

## 2023-10-26 VITALS — BODY MASS INDEX: 30.54 KG/M2 | TEMPERATURE: 98 F | WEIGHT: 238 LBS | HEIGHT: 74 IN

## 2023-10-26 DIAGNOSIS — M25.812 SHOULDER IMPINGEMENT, LEFT: Primary | ICD-10-CM

## 2023-10-26 PROCEDURE — 3017F COLORECTAL CA SCREEN DOC REV: CPT | Performed by: ORTHOPAEDIC SURGERY

## 2023-10-26 PROCEDURE — G8417 CALC BMI ABV UP PARAM F/U: HCPCS | Performed by: ORTHOPAEDIC SURGERY

## 2023-10-26 PROCEDURE — 99213 OFFICE O/P EST LOW 20 MIN: CPT | Performed by: ORTHOPAEDIC SURGERY

## 2023-10-26 PROCEDURE — G8484 FLU IMMUNIZE NO ADMIN: HCPCS | Performed by: ORTHOPAEDIC SURGERY

## 2023-10-26 PROCEDURE — G8427 DOCREV CUR MEDS BY ELIG CLIN: HCPCS | Performed by: ORTHOPAEDIC SURGERY

## 2023-10-26 PROCEDURE — 1036F TOBACCO NON-USER: CPT | Performed by: ORTHOPAEDIC SURGERY

## 2023-10-26 NOTE — PROGRESS NOTES
negative Ramin manuver, negative Cross arm test.     Left shoulder:  positive Impingement , positive Roper ,negative  Speeds,negative  Apprehension ,negative Moran Load Shift, negative Ramin manuver, negative Cross arm test.     X-ray:  None today    MRI:  1. Moderate to severe acromioclavicular degenerative joint disease. 2.  Moderate rotator cuff tendinopathy with evidence of subscapularis tendon  repair. No high-grade partial or full-thickness retear. 3.  Labral degeneration with posterosuperior and posteroinferior labral tears. 4.  Mild glenohumeral degenerative joint disease. Radiographic findings reviewed with patient        Impression:       Encounter Diagnosis   Name Primary? Shoulder impingement, left Yes       Plan:  Natural history and expected course discussed. Questions answered. Educational material distributed. Reduction in offending activity. Gentle ROM exercises  OTC analgesics as needed. I will proceed with a cortisone injection in the Left shoulder. Verbal and written consent was obtained for the injection. Skin was prepped with alcohol, 1 ml of Kenalog 40 mg and 9 ml of 0.25% Marcaine was injected to the posterior shoulder through the subacromial space of the Left Shoulder. The patient tolerated the injections well. I will see the patient back 1 month.

## 2023-11-06 DIAGNOSIS — M25.562 LEFT KNEE PAIN, UNSPECIFIED CHRONICITY: Primary | ICD-10-CM

## 2023-11-09 ENCOUNTER — OFFICE VISIT (OUTPATIENT)
Dept: ORTHOPEDIC SURGERY | Age: 63
End: 2023-11-09

## 2023-11-09 VITALS — HEIGHT: 74 IN | WEIGHT: 238 LBS | TEMPERATURE: 98 F | BODY MASS INDEX: 30.54 KG/M2

## 2023-11-09 DIAGNOSIS — M48.02 CERVICAL STENOSIS OF SPINAL CANAL: ICD-10-CM

## 2023-11-09 DIAGNOSIS — M25.812 SHOULDER IMPINGEMENT, LEFT: ICD-10-CM

## 2023-11-09 DIAGNOSIS — M17.12 PRIMARY OSTEOARTHRITIS OF LEFT KNEE: Primary | ICD-10-CM

## 2023-11-09 NOTE — PROGRESS NOTES
negative, Joana test negative. Hip exam:   Upon inspection, there is not deformity noted. Upon palpation there is not tenderness. ROM: is  full and symmetrical.   Strength: Hip Flexors 5/5; Hip Abductors 5/5; Hip Adduction 5/5. Knee exam:  Left knee exam shows;  range of motion of R. Knee is 0 to 125, and L. Knee is 0 to 125. The patient does have  pain on motion, effusion is mild, there is tenderness over the  medial region, there are not any masses, there is not ligamentous instability, there is not  deformity noted. Knee exam: left positive for moderate crepitations, some mild tenderness laxity is not noted with stress. There is not a popliteal cyst.    R. Knee:  Lachman's negative, Anterior Drawer negative, Posterior Drawer negative  Андрей's negative, Thallasy  negative,   PF grind test negative, Apprehension test negative, Patellar J sign  negative  L. Knee:  Lachman's negative, Anterior Drawer negative, Posterior Drawer negative  Андрей's negative, Thallasy  negative,   PF grind test negative, Apprehension test negative,  Patellar J sign  negative    Xray Exam:  Severe djd left knee with subluxation   Radiographic findings reviewed with patient    Assessment:  Encounter Diagnoses   Name Primary? Primary osteoarthritis of left knee Yes    Cervical stenosis of spinal canal     Shoulder impingement, left        Plan:  Natural history and expected course discussed. Questions answered. Educational materials distributed. Rest, ice, compression, and elevation (RICE) therapy. Reduction in offending activity. Patellar compression sleeve. OTC analgesics as needed. PT  Referral to [facility] [place]     I will proceed with a cortisone injection in the Left knee. Verbal and written consent was obtained for the injections. The skin was prepped with alcohol. 1mL of Kenalog 40mg and 9mL of 0.25% Marcaine was  injected to Left knee. The injection was given through the lateral side of the knee.  The patient

## 2023-11-14 RX ORDER — TRIAMCINOLONE ACETONIDE 40 MG/ML
40 INJECTION, SUSPENSION INTRA-ARTICULAR; INTRAMUSCULAR ONCE
Status: SHIPPED | OUTPATIENT
Start: 2023-11-14

## 2023-11-20 ENCOUNTER — EVALUATION (OUTPATIENT)
Dept: PHYSICAL THERAPY | Age: 63
End: 2023-11-20

## 2023-11-20 DIAGNOSIS — M48.02 CERVICAL STENOSIS OF SPINAL CANAL: ICD-10-CM

## 2023-11-20 DIAGNOSIS — M75.42 IMPINGEMENT SYNDROME OF LEFT SHOULDER: Primary | ICD-10-CM

## 2023-11-27 ENCOUNTER — OFFICE VISIT (OUTPATIENT)
Dept: PHYSICAL MEDICINE AND REHAB | Age: 63
End: 2023-11-27
Payer: COMMERCIAL

## 2023-11-27 VITALS
WEIGHT: 245 LBS | DIASTOLIC BLOOD PRESSURE: 75 MMHG | SYSTOLIC BLOOD PRESSURE: 115 MMHG | BODY MASS INDEX: 31.44 KG/M2 | HEIGHT: 74 IN | HEART RATE: 90 BPM

## 2023-11-27 DIAGNOSIS — M48.02 CERVICAL STENOSIS OF SPINE: Primary | ICD-10-CM

## 2023-11-27 PROCEDURE — G8417 CALC BMI ABV UP PARAM F/U: HCPCS | Performed by: PHYSICAL MEDICINE & REHABILITATION

## 2023-11-27 PROCEDURE — 1036F TOBACCO NON-USER: CPT | Performed by: PHYSICAL MEDICINE & REHABILITATION

## 2023-11-27 PROCEDURE — 3017F COLORECTAL CA SCREEN DOC REV: CPT | Performed by: PHYSICAL MEDICINE & REHABILITATION

## 2023-11-27 PROCEDURE — 3078F DIAST BP <80 MM HG: CPT | Performed by: PHYSICAL MEDICINE & REHABILITATION

## 2023-11-27 PROCEDURE — G8427 DOCREV CUR MEDS BY ELIG CLIN: HCPCS | Performed by: PHYSICAL MEDICINE & REHABILITATION

## 2023-11-27 PROCEDURE — 99214 OFFICE O/P EST MOD 30 MIN: CPT | Performed by: PHYSICAL MEDICINE & REHABILITATION

## 2023-11-27 PROCEDURE — G8484 FLU IMMUNIZE NO ADMIN: HCPCS | Performed by: PHYSICAL MEDICINE & REHABILITATION

## 2023-11-27 PROCEDURE — 3074F SYST BP LT 130 MM HG: CPT | Performed by: PHYSICAL MEDICINE & REHABILITATION

## 2023-11-27 RX ORDER — LANOLIN ALCOHOL/MO/W.PET/CERES
400 CREAM (GRAM) TOPICAL 4 TIMES DAILY
COMMUNITY
Start: 2023-11-17

## 2023-11-27 RX ORDER — GABAPENTIN 100 MG/1
100 CAPSULE ORAL EVERY 12 HOURS
COMMUNITY
Start: 2023-11-18

## 2023-11-27 RX ORDER — LANSOPRAZOLE 30 MG/1
CAPSULE, DELAYED RELEASE ORAL
COMMUNITY
Start: 2023-10-02 | End: 2023-11-27 | Stop reason: SDUPTHER

## 2023-12-01 ENCOUNTER — TELEPHONE (OUTPATIENT)
Dept: PHYSICAL MEDICINE AND REHAB | Age: 63
End: 2023-12-01

## 2023-12-01 NOTE — TELEPHONE ENCOUNTER
----- Message from Chanelle Bermudez DO sent at 12/1/2023  9:45 AM EST -----  Reviewed test abnormal, inform patient that it showed degenerative changes with foraminal stenosis.

## 2023-12-01 NOTE — TELEPHONE ENCOUNTER
Called and spoke with the patient and informed him the results of xray cervical.  Patient is aware and voiced understanding.

## 2023-12-15 ENCOUNTER — TELEPHONE (OUTPATIENT)
Dept: PHYSICAL MEDICINE AND REHAB | Age: 63
End: 2023-12-15

## 2023-12-15 NOTE — TELEPHONE ENCOUNTER
Spoke with patient regarding the results of MRI cervical spine. 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 With these new symptoms of (shoulder abduction weakness, impaired balance, fine motor impairment) I want him to see a spine surgeon before we do anything else. Does he want to go back to Dr. Ben Posadas who did his surgery. Patient would like to follow up with Dr. Ben Posadas. Patient states that he will call Dr. Gena France office to schedule an appointment and will call our office if he needs a new referral sent.

## 2024-01-09 ENCOUNTER — OFFICE VISIT (OUTPATIENT)
Dept: ORTHOPEDIC SURGERY | Age: 64
End: 2024-01-09

## 2024-01-09 VITALS — WEIGHT: 244 LBS | TEMPERATURE: 98 F | BODY MASS INDEX: 31.32 KG/M2 | HEIGHT: 74 IN

## 2024-01-09 DIAGNOSIS — M17.12 PRIMARY OSTEOARTHRITIS OF LEFT KNEE: Primary | ICD-10-CM

## 2024-01-09 NOTE — PROGRESS NOTES
Chief Complaint   Patient presents with    Knee Pain     Left knee pain follow up. Knee still painful when doing steps. Does believe cortisone injection did give him some relief. Still ambulating with cane.        Subjective:     Patient ID: Per Stroud is a 63 y.o..  male    Knee Pain  Patient presented for follow up left knee pain. He is about the same as when I saw him last. He has trouble going up and down stairs.    Past Medical History:   Diagnosis Date    Acid reflux     GERD (gastroesophageal reflux disease)     Hyperlipidemia     Hypertension     Hypokalemia     Hypomagnesemia     Low back pain      Past Surgical History:   Procedure Laterality Date    ANESTHESIA NERVE BLOCK Right 01/14/2020    RIGHT L2,3,4 MEDIAL BRANCH BLOCK WITHOUT SEDATION (CPT 52420,88285) performed by Sean Cervantes MD at [facility] OR    ANESTHESIA NERVE BLOCK Right 02/20/2020    RIGHT L2,3,4 MEDIAL BRANCH BLOCK (CPT 43085,03346) performed by Sean Cervantes MD at [facility] OR    ANESTHESIA NERVE BLOCK Left 06/29/2020    LEFT L2 L3 L4 MEDIAL BRANCH BLOCK (CPT 96590) ([facility] AFTER[facility]ON APPOINTMENT) performed by Sean Cervantes MD at [facility] OR    BACK SURGERY      May 4, 2018 decompression with Dr. Daily.     CARPAL TUNNEL RELEASE Left 01/24/2014    CARPAL TUNNEL RELEASE      left    CERVICAL FUSION  11/01/2021    COLONOSCOPY      FRACTURE SURGERY      arm    FRACTURE SURGERY      left arm    HERNIA REPAIR      HERNIA REPAIR      x2 child    KNEE ARTHROSCOPY Left 02/20/2015    NERVE BLOCK Right 01/14/2020    NERVE BLOCK Right 02/20/2020    L2,3,4 medial     NERVE BLOCK Right 05/04/2020    L2,3,4,5 medial radiofrequency     NERVE BLOCK Left 06/29/2020    medial branch block    PAIN MANAGEMENT PROCEDURE Right 05/04/2020    RIGHT L2, 3, 4 MEDIAL BRANCH L5 DORSALRAMUS RADIOFREQUENCY ABLATION performed by Sean Cervantes MD at [facility] OR    SHOULDER ARTHROSCOPY Left 4/14/2023    LEFT SHOULDER ARTHROSCOPY,

## 2024-02-07 ENCOUNTER — OFFICE VISIT (OUTPATIENT)
Dept: ORTHOPEDIC SURGERY | Facility: CLINIC | Age: 64
End: 2024-02-07
Payer: MEDICARE

## 2024-02-07 ENCOUNTER — HOSPITAL ENCOUNTER (OUTPATIENT)
Dept: RADIOLOGY | Facility: CLINIC | Age: 64
Discharge: HOME | End: 2024-02-07
Payer: MEDICARE

## 2024-02-07 DIAGNOSIS — M25.512 LEFT SHOULDER PAIN, UNSPECIFIED CHRONICITY: Primary | ICD-10-CM

## 2024-02-07 DIAGNOSIS — M25.512 LEFT SHOULDER PAIN, UNSPECIFIED CHRONICITY: ICD-10-CM

## 2024-02-07 PROCEDURE — 73030 X-RAY EXAM OF SHOULDER: CPT | Mod: LEFT SIDE | Performed by: RADIOLOGY

## 2024-02-07 PROCEDURE — 99214 OFFICE O/P EST MOD 30 MIN: CPT | Performed by: ORTHOPAEDIC SURGERY

## 2024-02-07 PROCEDURE — 73030 X-RAY EXAM OF SHOULDER: CPT | Mod: LT

## 2024-02-07 NOTE — PROGRESS NOTES
Ronaldo travis.  He had a urgent anterior cervical decompression with C5 corpectomy back in 2021 for critically severe spinal cord compression.  He improved.  He did have increased weakness and left shoulder function following surgery.  He and his wife indicate that is preoperative symptoms of pain and paresthesias in both arms and hands and his dexterity improved significantly following surgery.    He has had some ongoing issues with his left shoulder.  He had a left tenodesis with Dr. Vang that helped to some degree.    On exam today his gait is stable.  Forward flexion of the left shoulder to 120.  He does have limited internal rotation.    Otherwise his strength is intact in both upper extremities.    Plain films today of his left shoulder show mildly high riding left humeral head but no significant arthritic change.    His updated cervical MRI shows he has been well decompressed at the area of the preoperative critically severely compressed spinal cord.  There was significant preoperative myelomalacia.  There is mild to moderate stenosis at C3-4 with bilateral foraminal stenosis but no high-grade spinal cord compression.    He did well following urgent anterior cervical decompression and fusion for severe spinal cord compression.  He does have some residual weakness and limited function in the left shoulder which I think is multifactorial.    I would not recommend any aggressive surgical treatment.  He does have focal kyphosis at the site of his corpectomy and fusion but he is not having significant neck pain.  As well there is some adjacent level stenosis at C3-4 but I would not recommend considering any further surgery at this point and he agrees with that.    I have encouraged him to continue with an exercise and conditioning program for that left shoulder.    He will keep us updated on his progress.    ** Dictated with voice recognition software and not immediately reviewed for errors in grammar and/or  spelling **

## 2024-02-07 NOTE — LETTER
February 7, 2024     Frank Dominguez DO  [address] [place] [address]    Patient: Ronaldo Thomas   YOB: 1960   Date of Visit: 2/7/2024       Dear Dr. Frank Dominguez, :    Thank you for referring Ronaldo Thomas to me for evaluation. Below are my notes for this consultation.  If you have questions, please do not hesitate to call me. I look forward to following your patient along with you.       Sincerely,     Carlitos Phipps MD      CC: No Recipients  ______________________________________________________________________________________    Ronaldo returns.  He had a urgent anterior cervical decompression with C5 corpectomy back in 2021 for critically severe spinal cord compression.  He improved.  He did have increased weakness and left shoulder function following surgery.  He and his wife indicate that is preoperative symptoms of pain and paresthesias in both arms and hands and his dexterity improved significantly following surgery.    He has had some ongoing issues with his left shoulder.  He had a left tenodesis with Dr. Vang that helped to some degree.    On exam today his gait is stable.  Forward flexion of the left shoulder to 120.  He does have limited internal rotation.    Otherwise his strength is intact in both upper extremities.    Plain films today of his left shoulder show mildly high riding left humeral head but no significant arthritic change.    His updated cervical MRI shows he has been well decompressed at the area of the preoperative critically severely compressed spinal cord.  There was significant preoperative myelomalacia.  There is mild to moderate stenosis at C3-4 with bilateral foraminal stenosis but no high-grade spinal cord compression.    He did well following urgent anterior cervical decompression and fusion for severe spinal cord compression.  He does have some residual weakness and limited function in the left shoulder which I think is  multifactorial.    I would not recommend any aggressive surgical treatment.  He does have focal kyphosis at the site of his corpectomy and fusion but he is not having significant neck pain.  As well there is some adjacent level stenosis at C3-4 but I would not recommend considering any further surgery at this point and he agrees with that.    I have encouraged him to continue with an exercise and conditioning program for that left shoulder.    He will keep us updated on his progress.    ** Dictated with voice recognition software and not immediately reviewed for errors in grammar and/or spelling **

## 2024-02-08 ENCOUNTER — TELEPHONE (OUTPATIENT)
Dept: PHYSICAL MEDICINE AND REHAB | Age: 64
End: 2024-02-08

## 2024-02-08 NOTE — TELEPHONE ENCOUNTER
Patient called in stating he saw Dr Levy Daily yesterday at [facility] and  Doesn't feel he needs surgery and patient wanted to know if anything next in treatment plan or if he should schedule a follow up??  I called Dr. Dutton office to get his office notes faxed here  please advise

## 2024-02-12 NOTE — TELEPHONE ENCOUNTER
Called patient with response from Dr. Alcantar and gave him the options  he would like to think about this and possibly call us back

## 2024-02-15 ENCOUNTER — EVALUATION (OUTPATIENT)
Dept: PHYSICAL THERAPY | Age: 64
End: 2024-02-15
Payer: MEDICARE

## 2024-02-15 DIAGNOSIS — M48.02 CERVICAL STENOSIS OF SPINAL CANAL: Primary | ICD-10-CM

## 2024-02-15 DIAGNOSIS — M25.812 SHOULDER IMPINGEMENT, LEFT: ICD-10-CM

## 2024-02-15 PROCEDURE — 97163 PT EVAL HIGH COMPLEX 45 MIN: CPT | Performed by: PHYSICAL THERAPIST

## 2024-02-15 NOTE — PROGRESS NOTES
care.    Physician's Comments/Revisions:               Physician's Printed Name:                                           Physician's Signature:                                                               Date:

## 2024-02-21 ENCOUNTER — TREATMENT (OUTPATIENT)
Dept: PHYSICAL THERAPY | Age: 64
End: 2024-02-21

## 2024-02-21 DIAGNOSIS — M75.42 IMPINGEMENT SYNDROME OF LEFT SHOULDER: Primary | ICD-10-CM

## 2024-02-21 NOTE — PROGRESS NOTES
Physical Therapy Daily Treatment Note    Date: 2024  Patient Name: Per Stroud  : 1960   MRN: [id number]  DOInjury:   DOSx:    Referring Provider: Levy Dutton MD  [facility] [place] [address]     Medical Diagnosis:      Diagnosis Orders   1. Impingement syndrome of left shoulder            Outcome Measure:      X = TO BE PERFORMED NEXT VISIT  > = PROGRESS TO THIS    S: Pt reports 7-8/10 shoulder pain; left side mostly  O: Discussed anatomy, physiology, body mechanics, principles of loading, and progressive loading/activity.      Right Shoulder:  AROM: 150° Forward elevation,  90° ER,  IR to T9  PROM: 180° Forward elevation,  105° ER,  35° IR     Left Shoulder:  AROM: 135° Forward elevation,  60° ER,  IR to T11  PROM: 170° Forward elevation,  100° ER , 45° IR     Strength:  Right Shoulder: Flexion 4/5,  Abduction 3+/5, ER 4/5, IR 4+/5       Left Shoulder: Flexion 3+/5,  Abduction 2+/5, ER 4-/5, IR 4+/5   Elbow extension and flexion: 4-/5    Time 9902-7043     Visit 2 Repeat outcome measure at mid point and end.    Pain Pain at rest 5/10     ROM      Modalities       Use sparingly if at all.  Prefer an active program.  MO   Manual         MT         Stretch      Table slides   TE   Wall Flexion   TE   Wall ER stretch   TE   Towel IR stretch   TE   IR reaching behind back   TE   Exercise      Shoulder scaption isometrics      Shoulder ER isometrics       Shrugs AROM   TE   Pendulum Ex   TE   Pulleys - flex X 3 min  TE   Pulleys-IR   TE   Supine wand chest press 2 x 15  TE   Supine wand flex 2 x 15  TE   Supine wand ER/IR 2 x 15  TE   Standing wand behind back IR 2 x 15  TE   Supine flexion   TE   S-lying ER   TE   Standing wand flex   TE   Standing flexion   TE   ROWS: H   TA   ROWS: M Green 2 x 15  TA   ROWS: L   TA   ER (towel roll under arm)   TE   IR (towel roll under arm)   TE               A:  Tolerated well.  No new or significant changes this session  P: Continue

## 2024-02-23 ENCOUNTER — TREATMENT (OUTPATIENT)
Dept: PHYSICAL THERAPY | Age: 64
End: 2024-02-23

## 2024-02-23 DIAGNOSIS — M48.02 CERVICAL STENOSIS OF SPINAL CANAL: Primary | ICD-10-CM

## 2024-02-23 DIAGNOSIS — M75.42 IMPINGEMENT SYNDROME OF LEFT SHOULDER: ICD-10-CM

## 2024-02-23 NOTE — PROGRESS NOTES
Physical Therapy Daily Treatment Note    Date: 2024  Patient Name: Per Stroud  : 1960   MRN: [id number]  DOInjury: 2021  DOSx: 2023   Referring Provider:  Levy Dutton MD [address] [place] [address]    Medical Diagnosis:   1. Biceps tendon tear          2. Shoulder impingement, left       X = TO BE PERFORMED NEXT VISIT  > = PROGRESS TO THIS  Strength:  Right Shoulder: Flexion 4/5,  Abduction 3+/5, ER 4/5, IR 4+/5       Left Shoulder: Flexion 3+/5,  Abduction 2+/5, ER 4-/5, IR 4+/5   Elbow extension and flexion: 4-/5  S: Pt reports 710 left shoulder pain and weakness  O:    Time 0294-7072     Visit Eval + 2/  Authorization #[id number]  approved 10 visits   10820, 79544, 10991, 74469  2024 through 2024      Pain 7/10     ROM 2/15/24  Right Shoulder:  AROM: 150° Forward elevation,  90° ER,  IR to T9  PROM: 180° Forward elevation,  105° ER,  35° IR     Left Shoulder:  AROM: 135° Forward elevation,  60° ER,  IR to T11  PROM: 170° Forward elevation,  100° ER , 45° IR     Modalities       Use sparingly if at all.  Prefer an active program.  MO   Manual      Stretching all directions   MT         Stretch      Table slides Added to HEP, review with patient next visit     Wall Flexion  TE   IR stretch behind back ?  TE      TE   Exercise      Pulleys - flex 4 min  TE   Pulleys-IR  TE   Seated UBE 1.5/1.5 min F/B     Supine wand chest press 2 x 15 TE   Supine wand flex 2 x 15 TE   Supine wand ER/IR (goal post) 2 x 15 TE    wand IR TE   Supine flexion  TE   S-lying ER  TE    seated wand flex Blk wand TE    seated flexion unable d/t weakness circuit TE   Seated chest press circuit    ROWS: H blue 2 x 15 sitting TA   ROWS: M blue 2 x 15 sitting TA   ROWS: L blue 2 x 15 sitting TA   ER (towel roll under arm) Red 2 x 20 sitting TE   IR (towel roll under arm) Red  2 x 20 sitting TE   Flexion in scapular plane      Shelf Lift      Shoulder press      Biceps curl

## 2024-02-28 ENCOUNTER — TREATMENT (OUTPATIENT)
Dept: PHYSICAL THERAPY | Age: 64
End: 2024-02-28
Payer: MEDICARE

## 2024-02-28 DIAGNOSIS — M48.02 CERVICAL STENOSIS OF SPINAL CANAL: Primary | ICD-10-CM

## 2024-02-28 PROCEDURE — 97112 NEUROMUSCULAR REEDUCATION: CPT

## 2024-02-28 PROCEDURE — 97110 THERAPEUTIC EXERCISES: CPT

## 2024-02-28 NOTE — PROGRESS NOTES
Biceps curl      Triceps press down                  A: Tolerated well. Reports fatigue/soreness at end of this session. Feedback and cues necessary for developing neuromuscular control.  Movement education and guided movement interventions such as verbal and tactile cues used to improve performance and control.  P: will continue POC  Ling Carrillo PTA    Treatment Charges: Mins Units   Initial Evaluation     Re-Evaluation     Ther Exercise         TE 10 1   Manual Therapy     MT     Ther Activities        TA     Gait Training          GT     Neuro Re-education NR 20 1   Modalities     Non-Billable Service Time 10 0   Other     Total Time/Units 40 2    k

## 2024-03-01 ENCOUNTER — TREATMENT (OUTPATIENT)
Dept: PHYSICAL THERAPY | Age: 64
End: 2024-03-01

## 2024-03-01 DIAGNOSIS — M48.02 CERVICAL STENOSIS OF SPINAL CANAL: Primary | ICD-10-CM

## 2024-03-01 NOTE — PROGRESS NOTES
Shoulder press      Biceps curl      Triceps press down                  A: Tolerated well. Reports fatigue/soreness at end of this session. Feedback and cues necessary for developing neuromuscular control.  Movement education and guided movement interventions such as verbal and tactile cues used to improve performance and control.  P: will continue POC  Ling Carrillo PTA    Treatment Charges: Mins Units   Initial Evaluation     Re-Evaluation     Ther Exercise         TE 10 1   Manual Therapy     MT     Ther Activities        TA     Gait Training          GT     Neuro Re-education NR 20 1   Modalities     Non-Billable Service Time 10 0   Other     Total Time/Units 40 2    k

## 2024-03-07 ENCOUNTER — TREATMENT (OUTPATIENT)
Dept: PHYSICAL THERAPY | Age: 64
End: 2024-03-07

## 2024-03-07 DIAGNOSIS — M25.812 SHOULDER IMPINGEMENT, LEFT: ICD-10-CM

## 2024-03-07 DIAGNOSIS — S46.219A BICEPS TENDON TEAR: Primary | ICD-10-CM

## 2024-03-07 NOTE — PROGRESS NOTES
Physical Therapy Daily Treatment Note    Date: 3/7/2024  Patient Name: Per Stroud  : 1960   MRN: [id number]  DOInjury: 2021  DOSx: 2023   Referring Provider:  Levy Dutton MD [address] [place] [address]    Medical Diagnosis:   1. Biceps tendon tear          2. Shoulder impingement, left       X = TO BE PERFORMED NEXT VISIT  > = PROGRESS TO THIS  Strength:  Right Shoulder: Flexion 4/5,  Abduction 3+/5, ER 4/5, IR 4+/5       Left Shoulder: Flexion 3+/5,  Abduction 2+/5, ER 4-/5, IR 4+/5   Elbow extension and flexion: 4-/5  S: Pt reports 7/10 left shoulder pain and weakness  O:    Time 1786-5509     Visit Eval + 5/10  Authorization #[id number]  approved 10 visits   41606, 25974, 95001, 80271  2024 through 2024      Pain 7/10     ROM 2/15/24  Right Shoulder:  AROM: 150° Forward elevation,  90° ER,  IR to T9  PROM: 180° Forward elevation,  105° ER,  35° IR     Left Shoulder:  AROM: 135° Forward elevation,  60° ER,  IR to T11  PROM: 170° Forward elevation,  100° ER , 45° IR     Modalities       Use sparingly if at all.  Prefer an active program.  MO   Manual      Stretching all directions   MT         Stretch      Table slides Added to HEP, review with patient next visit     Wall Flexion  TE   IR stretch behind back ?  TE      TE   Exercise      Pulleys - flex 4 min  TE   Pulleys-IR 3 min  TE    UBE 1.5/1.5 min F/B standing    Supine wand chest press 2 x 15 Blk wand TE   Supine wand flex 2 x 15 Blk wand TE   Supine wand ER/IR (goal post) 2 x 15 Blk wand TE    wand IR TE   Supine flexion  TE   S-lying ER  TE    seated wand press 3 x 10 Blk wand TE    seated flexion   circuit TE   Seated wand flexion 2 x 10 circuit    ROWS: H Blue 2 x 15 sitting TA   ROWS: M Blue 2 x 15 sitting TA   ROWS: L Blue 2 x 15 sitting TA   ER (towel roll under arm) Red 2 x 20 sitting TE   IR (towel roll under arm) Red  2 x 20 sitting TE   Flexion in scapular plane      Shelf Lift

## 2024-03-13 ENCOUNTER — TREATMENT (OUTPATIENT)
Dept: PHYSICAL THERAPY | Age: 64
End: 2024-03-13

## 2024-03-13 DIAGNOSIS — M25.812 SHOULDER IMPINGEMENT, LEFT: ICD-10-CM

## 2024-03-13 DIAGNOSIS — S46.219A BICEPS TENDON TEAR: Primary | ICD-10-CM

## 2024-03-13 NOTE — PROGRESS NOTES
Physical Therapy Daily Treatment Note    Date: 3/13/2024  Patient Name: Per Stroud  : 1960   MRN: [id number]  DOInjury: 2021  DOSx: 2023   Referring Provider:  Levy Dutton MD [address]  [place] [address]    Medical Diagnosis:   1. Biceps tendon tear          2. Shoulder impingement, left       X = TO BE PERFORMED NEXT VISIT  > = PROGRESS TO THIS  Strength:  Right Shoulder: Flexion 4/5,  Abduction 3+/5, ER 4/5, IR 4+/5       Left Shoulder: Flexion 3+/5,  Abduction 2+/5, ER 4-/5, IR 4+/5   Elbow extension and flexion: 4-/5  S: Pt reports 7/10 left shoulder pain and weakness  O:  No RTD for shoulder, released from Dr Anthony [phone]     Visit Eval + 5/10  Authorization #[id number]  approved 10 visits   87341, 76061, 16938, 00282  2024 through 2024      Pain 5/10     ROM 2/15/24  Right Shoulder:  AROM: 150° Forward elevation,  90° ER,  IR to T9  PROM: 180° Forward elevation,  105° ER,  35° IR     Left Shoulder:  AROM: 135° Forward elevation,  60° ER,  IR to T11  PROM: 170° Forward elevation,  100° ER , 45° IR     Modalities       Use sparingly if at all.  Prefer an active program.  MO   Manual      Stretching all directions   MT         Stretch      Table slides Added to HEP, review with patient next visit     Wall Flexion  TE   IR stretch behind back ?  TE      TE   Exercise      Pulleys - flex 4 min  TE   Pulleys-IR 3 min  TE    UBE 2/2 min F/B standing    Supine wand chest press 2 x 15 Blk wand TE   Supine wand flex 2 x 15 Blk wand TE   Supine wand ER/IR (goal post) 2 x 15 Blk wand TE    wand IR TE   Supine flexion  TE   S-lying ER  TE    seated wand press 3 x 10 Blk wand TE    seated flexion   circuit TE   Seated wand flexion 2 x 10 circuit    ROWS: H Blue 2 x 15 sitting TA   ROWS: M Blue 2 x 15 sitting TA   ROWS: L Blue 2 x 15 sitting TA   ER (towel roll under arm) Red 2 x 20 sitting TE   IR (towel roll under arm) Red  2 x 20 sitting TE   Flexion in

## 2024-03-15 ENCOUNTER — TREATMENT (OUTPATIENT)
Dept: PHYSICAL THERAPY | Age: 64
End: 2024-03-15

## 2024-03-15 DIAGNOSIS — M25.812 SHOULDER IMPINGEMENT, LEFT: ICD-10-CM

## 2024-03-15 DIAGNOSIS — S46.219A BICEPS TENDON TEAR: Primary | ICD-10-CM

## 2024-03-15 NOTE — PROGRESS NOTES
Physical Therapy Daily Treatment Note    Date: 3/15/2024  Patient Name: Per Stroud  : 1960   MRN: [id number]  DOInjury: 2021  DOSx: 2023   Referring Provider:  Levy Dutton MD [address]  [place] [address]    Medical Diagnosis:   1. Biceps tendon tear          2. Shoulder impingement, left       X = TO BE PERFORMED NEXT VISIT  > = PROGRESS TO THIS  Strength:  Right Shoulder: Flexion 4/5,  Abduction 3+/5, ER 4/5, IR 4+/5       Left Shoulder: Flexion 3+/5,  Abduction 2+/5, ER 4-/5, IR 4+/5   Elbow extension and flexion: 4-/5  S: Pt reports back is also sore  O:  No RTD for shoulder, released from Dr Anthony [phone]     Visit Eval + 6/10  Authorization #[id number]  approved 10 visits   01294, 52930, 08086, 34298  2024 through 2024      Pain 5/10     ROM 2/15/24  Right Shoulder:  AROM: 150° Forward elevation,  90° ER,  IR to T9  PROM: 180° Forward elevation,  105° ER,  35° IR     Left Shoulder:  AROM: 135° Forward elevation,  60° ER,  IR to T11  PROM: 170° Forward elevation,  100° ER , 45° IR     Modalities       Use sparingly if at all.  Prefer an active program.  MO   Manual      Stretching all directions   MT         Stretch      Table slides Added to HEP, review with patient next visit     Wall Flexion  TE   IR stretch behind back ?  TE      TE   Exercise      Pulleys - flex 4 min  TE   Pulleys-IR 3 min  TE    UBE 2/2 min F/B standing    Supine wand chest press 2 x 15 Blk wand TE   Supine wand flex 2 x 15 Blk wand TE   Supine wand ER/IR (goal post) 2 x 15 Blk wand TE    wand IR TE   Supine flexion  TE   S-lying ER  TE    seated wand press 3 x 10 Blk wand TE    seated flexion   circuit TE   Seated wand flexion 2 x 10 circuit    ROWS: H Blue 2 x 15 sitting TA   ROWS: M Blue 2 x 15 sitting TA   ROWS: L Blue 2 x 15 sitting TA   ER (towel roll under arm) Red 2 x 20 sitting TE   IR (towel roll under arm) Red  2 x 20 sitting TE   Flexion in scapular plane      Shelf

## 2024-03-18 DIAGNOSIS — M17.12 PRIMARY OSTEOARTHRITIS OF LEFT KNEE: Primary | ICD-10-CM

## 2024-03-20 ENCOUNTER — TREATMENT (OUTPATIENT)
Dept: PHYSICAL THERAPY | Age: 64
End: 2024-03-20

## 2024-03-20 DIAGNOSIS — S46.219A BICEPS TENDON TEAR: Primary | ICD-10-CM

## 2024-03-20 DIAGNOSIS — M25.812 SHOULDER IMPINGEMENT, LEFT: ICD-10-CM

## 2024-03-20 NOTE — PROGRESS NOTES
Physical Therapy Daily Treatment Note    Date: 3/20/2024  Patient Name: Per Stroud  : 1960   MRN: [id number]  DOInjury: 2021  DOSx: 2023   Referring Provider:  Levy Dutton MD [address]  [place] [address]    Medical Diagnosis:   1. Biceps tendon tear          2. Shoulder impingement, left       X = TO BE PERFORMED NEXT VISIT  > = PROGRESS TO THIS  Strength:  Right Shoulder: Flexion 4/5,  Abduction 3+/5, ER 4/5, IR 4+/5       Left Shoulder: Flexion 3+/5,  Abduction 2+/5, ER 4-/5, IR 4+/5   Elbow extension and flexion: 4-/5  S: Pt reports back is also sore  O:  No RTD for shoulder, released from Dr Anthony [phone]     Visit Eval + 7/10  Authorization #[id number]  approved 10 visits   05628, 70363, 54348, 69748  2024 through 2024      Pain 5/10     ROM 2/15/24  Right Shoulder:  AROM: 150° Forward elevation,  90° ER,  IR to T9  PROM: 180° Forward elevation,  105° ER,  35° IR     Left Shoulder:  AROM: 135° Forward elevation,  60° ER,  IR to T11  PROM: 170° Forward elevation,  100° ER , 45° IR     Modalities       Use sparingly if at all.  Prefer an active program.  MO   Manual      Stretching all directions   MT         Stretch      Table slides Added to HEP, review with patient next visit     Wall Flexion  TE   IR stretch behind back ?  TE      TE   Exercise      Pulleys - flex 4 min  TE   Pulleys-IR 3 min  TE    UBE 2/2 min F/B standing    Supine wand chest press 2 x 15 Blk wand TE   Supine wand flex 2 x 15 Blk wand TE   Supine wand ER/IR (goal post) 2 x 15 Blk wand TE    wand IR TE   Supine flexion  TE   S-lying ER  TE    seated wand press 3 x 10 Blk wand TE    seated flexion   circuit TE   Seated wand flexion 2 x 10 circuit    ROWS: H Blue 2 x 15 sitting TA   ROWS: M Blue 2 x 15 sitting TA   ROWS: L Blue 2 x 15 sitting TA   ER (towel roll under arm) Red 2 x 20 sitting TE   IR (towel roll under arm) Red  2 x 20 sitting TE   Flexion in scapular plane      Shelf

## 2024-03-22 ENCOUNTER — TREATMENT (OUTPATIENT)
Dept: PHYSICAL THERAPY | Age: 64
End: 2024-03-22

## 2024-03-22 DIAGNOSIS — S46.219A BICEPS TENDON TEAR: Primary | ICD-10-CM

## 2024-03-22 DIAGNOSIS — M25.812 SHOULDER IMPINGEMENT, LEFT: ICD-10-CM

## 2024-03-22 NOTE — PROGRESS NOTES
Physical Therapy Daily Treatment Note    Date: 3/22/2024  Patient Name: Per Stroud  : 1960   MRN: [id number]  DOInjury: 2021  DOSx: 2023   Referring Provider:  Levy Dutton MD [address]  [place] [address]    Medical Diagnosis:   1. Biceps tendon tear          2. Shoulder impingement, left       X = TO BE PERFORMED NEXT VISIT  > = PROGRESS TO THIS  Strength:  Right Shoulder: Flexion 4/5,  Abduction 3+/5, ER 4/5, IR 4+/5       Left Shoulder: Flexion 3+/5,  Abduction 2+/5, ER 4-/5, IR 4+/5   Elbow extension and flexion: 4-/5  S: Pt reports back is also sore  O:  No RTD for shoulder, released from Dr Anthony [phone]     Visit Eval + 7/10  Authorization #[id number]  approved 10 visits   98205, 39437, 92637, 29529  2024 through 2024      Pain 5/10     ROM 2/15/24  Right Shoulder:  AROM: 150° Forward elevation,  90° ER,  IR to T9  PROM: 180° Forward elevation,  105° ER,  35° IR     Left Shoulder:  AROM: 135° Forward elevation,  60° ER,  IR to T11  PROM: 170° Forward elevation,  100° ER , 45° IR     Modalities       Use sparingly if at all.  Prefer an active program.  MO   Manual      Stretching all directions   MT         Stretch      Table slides Added to HEP, review with patient next visit     Wall Flexion  TE   IR stretch behind back ?  TE      TE   Exercise      Pulleys - flex 4 min  TE   Pulleys-IR 3 min  TE    UBE 2/2 min F/B standing    Supine wand chest press 2 x 15 Blk wand TE   Supine wand flex 2 x 15 Blk wand TE   Supine wand ER/IR (goal post) 2 x 15 Blk wand TE    wand IR TE   Supine flexion  TE   S-lying ER  TE    seated wand press 3 x 10 Blk wand TE    seated flexion   circuit TE   Seated wand flexion 2 x 10 circuit    ROWS: H Blue 2 x 15 sitting TA   ROWS: M Blue 2 x 15 sitting TA   ROWS: L Blue 2 x 15 sitting TA   ER (towel roll under arm) Red 2 x 20 sitting TE   IR (towel roll under arm) Red  2 x 20 sitting TE   Flexion in scapular plane      Shelf  H/H 9.3/28.7 (L) BUN/Cr56/1.8 (H)

## 2024-03-25 ENCOUNTER — OFFICE VISIT (OUTPATIENT)
Dept: ORTHOPEDIC SURGERY | Age: 64
End: 2024-03-25
Payer: MEDICARE

## 2024-03-25 VITALS — HEIGHT: 74 IN | BODY MASS INDEX: 31.32 KG/M2 | TEMPERATURE: 98 F | WEIGHT: 244 LBS

## 2024-03-25 DIAGNOSIS — M17.12 PRIMARY OSTEOARTHRITIS OF LEFT KNEE: Primary | ICD-10-CM

## 2024-03-25 PROCEDURE — 1036F TOBACCO NON-USER: CPT | Performed by: ORTHOPAEDIC SURGERY

## 2024-03-25 PROCEDURE — 3017F COLORECTAL CA SCREEN DOC REV: CPT | Performed by: ORTHOPAEDIC SURGERY

## 2024-03-25 PROCEDURE — 99214 OFFICE O/P EST MOD 30 MIN: CPT | Performed by: ORTHOPAEDIC SURGERY

## 2024-03-25 PROCEDURE — G8484 FLU IMMUNIZE NO ADMIN: HCPCS | Performed by: ORTHOPAEDIC SURGERY

## 2024-03-25 PROCEDURE — G8427 DOCREV CUR MEDS BY ELIG CLIN: HCPCS | Performed by: ORTHOPAEDIC SURGERY

## 2024-03-25 PROCEDURE — G8417 CALC BMI ABV UP PARAM F/U: HCPCS | Performed by: ORTHOPAEDIC SURGERY

## 2024-03-25 PROCEDURE — 1123F ACP DISCUSS/DSCN MKR DOCD: CPT | Performed by: ORTHOPAEDIC SURGERY

## 2024-03-25 NOTE — PROGRESS NOTES
Chief Complaint   Patient presents with    Knee Pain     Left Knee F/U, pending surgery on 04/15/2024       Subjective:     Patient ID: Per Stroud is a 64 y.o..  male    Knee Pain  Patient presented for follow up left knee pain. He is about the same as when I saw him last. He has trouble going up and down stairs. He has had prior CSI, knee arthroscopy without relief.    Past Medical History:   Diagnosis Date    Acid reflux     GERD (gastroesophageal reflux disease)     Hyperlipidemia     Hypertension     Hypokalemia     Hypomagnesemia     Low back pain      Past Surgical History:   Procedure Laterality Date    ANESTHESIA NERVE BLOCK Right 01/14/2020    RIGHT L2,3,4 MEDIAL BRANCH BLOCK WITHOUT SEDATION (CPT 21628,98672) performed by Sean Cervantes MD at [facility] OR    ANESTHESIA NERVE BLOCK Right 02/20/2020    RIGHT L2,3,4 MEDIAL BRANCH BLOCK (CPT 45809,42019) performed by Sean Cervantes MD at [facility] OR    ANESTHESIA NERVE BLOCK Left 06/29/2020    LEFT L2 L3 L4 MEDIAL BRANCH BLOCK (CPT 53533) (WANTS AFTERNOON APPOINTMENT) performed by Sean Cervantes MD at [facility] OR    BACK SURGERY      May 4, 2018 decompression with Dr. Daily.     CARPAL TUNNEL RELEASE Left 01/24/2014    CARPAL TUNNEL RELEASE      left    CERVICAL FUSION  11/01/2021    COLONOSCOPY      FRACTURE SURGERY      arm    FRACTURE SURGERY      left arm    HERNIA REPAIR      HERNIA REPAIR      x2 child    KNEE ARTHROSCOPY Left 02/20/2015    NERVE BLOCK Right 01/14/2020    NERVE BLOCK Right 02/20/2020    L2,3,4 medial     NERVE BLOCK Right 05/04/2020    L2,3,4,5 medial radiofrequency     NERVE BLOCK Left 06/29/2020    medial branch block    PAIN MANAGEMENT PROCEDURE Right 05/04/2020    RIGHT L2, 3, 4 MEDIAL BRANCH L5 DORSALRAMUS RADIOFREQUENCY ABLATION performed by Sean Cervantes MD at [facility] OR    SHOULDER ARTHROSCOPY Left 4/14/2023    LEFT SHOULDER ARTHROSCOPY, TENDOESIS, SUBACROMIAL DECOMPRESSION AND DEBRIDEMENT

## 2024-03-27 ENCOUNTER — TELEPHONE (OUTPATIENT)
Dept: ORTHOPEDIC SURGERY | Age: 64
End: 2024-03-27

## 2024-03-27 ENCOUNTER — PREP FOR PROCEDURE (OUTPATIENT)
Dept: ORTHOPEDIC SURGERY | Age: 64
End: 2024-03-27

## 2024-03-27 PROBLEM — M17.12 LEFT KNEE DJD: Status: ACTIVE | Noted: 2024-03-27

## 2024-03-27 LAB
ALBUMIN SERPL-MCNC: 4.5 G/DL (ref 3.5–5.2)
ALP SERPL-CCNC: 34 U/L (ref 40–129)
ALT SERPL-CCNC: 21 U/L (ref 0–40)
ANION GAP SERPL CALCULATED.3IONS-SCNC: 12 MMOL/L (ref 7–16)
AST SERPL-CCNC: 25 U/L (ref 0–39)
BILIRUB SERPL-MCNC: 0.3 MG/DL (ref 0–1.2)
BUN SERPL-MCNC: 24 MG/DL (ref 6–23)
CALCIUM SERPL-MCNC: 10.1 MG/DL (ref 8.6–10.2)
CHLORIDE SERPL-SCNC: 102 MMOL/L (ref 98–107)
CHOLEST SERPL-MCNC: 152 MG/DL
CO2 SERPL-SCNC: 24 MMOL/L (ref 22–29)
CREAT SERPL-MCNC: 0.8 MG/DL (ref 0.7–1.2)
ERYTHROCYTE [DISTWIDTH] IN BLOOD BY AUTOMATED COUNT: 12.3 % (ref 11.5–15)
GFR SERPL CREATININE-BSD FRML MDRD: >90 ML/MIN/1.73M2
GLUCOSE SERPL-MCNC: 98 MG/DL (ref 74–99)
HCT VFR BLD AUTO: 43.7 % (ref 37–54)
HDLC SERPL-MCNC: 34 MG/DL
HGB BLD-MCNC: 15.1 G/DL (ref 12.5–16.5)
LDLC SERPL CALC-MCNC: 94 MG/DL
MCH RBC QN AUTO: 33.6 PG (ref 26–35)
MCHC RBC AUTO-ENTMCNC: 34.6 G/DL (ref 32–34.5)
MCV RBC AUTO: 97.3 FL (ref 80–99.9)
PLATELET # BLD AUTO: 349 K/UL (ref 130–450)
PMV BLD AUTO: 9.8 FL (ref 7–12)
POTASSIUM SERPL-SCNC: 4.5 MMOL/L (ref 3.5–5)
PROT SERPL-MCNC: 7.3 G/DL (ref 6.4–8.3)
RBC # BLD AUTO: 4.49 M/UL (ref 3.8–5.8)
SODIUM SERPL-SCNC: 138 MMOL/L (ref 132–146)
TRIGL SERPL-MCNC: 121 MG/DL
TSH SERPL DL<=0.05 MIU/L-ACNC: 1.54 UIU/ML (ref 0.27–4.2)
VLDLC SERPL CALC-MCNC: 24 MG/DL
WBC OTHER # BLD: 7 K/UL (ref 4.5–11.5)

## 2024-03-27 NOTE — TELEPHONE ENCOUNTER
Prior Authorization Form:      DEMOGRAPHICS:                     Patient Name:  Ankit Stroud  Patient :  1960            Insurance:  Payor: [employer] MEDIC[employer] / Plan: [employer] GOLD PLUS HMO / Product Type: *No Product type* /   Insurance ID Number:    Payer/Plan Subscr  Sex Relation Sub. Ins. ID Effective Group Num   1. [employer] MEDICA* ANKIT STROUD* 1960 Male Self [id number]                                   [address] [address]   2. [place]* ANKIT STROUD* 1960 Male Self [id number]                                    [address]         DIAGNOSIS & PROCEDURE:                       Procedure/Operation: LEFT KNEE TOTAL KNEE ARTHROPLASTY           CPT Code: 46962    Diagnosis:  LEFT KNEE DJD    ICD10 Code: M17.12    Location:  [facility]    Surgeon:  HARDEEP LONG    SCHEDULING INFORMATION:                          Date: 4/15/24    Time: 7AM               Anesthesia:  General                                                       Status:  Outpatient        Special Comments:  HANNA       Electronically signed by Lalita Rivas ATC on 3/27/2024 at 10:45 AM

## 2024-03-28 ENCOUNTER — TREATMENT (OUTPATIENT)
Dept: PHYSICAL THERAPY | Age: 64
End: 2024-03-28

## 2024-03-28 DIAGNOSIS — S46.219A BICEPS TENDON TEAR: Primary | ICD-10-CM

## 2024-03-28 DIAGNOSIS — M25.812 SHOULDER IMPINGEMENT, LEFT: ICD-10-CM

## 2024-03-28 NOTE — PROGRESS NOTES
Physical Therapy Daily Treatment Note    Date: 3/28/2024  Patient Name: Per Stroud  : 1960   MRN: [id number]  DOInjury: 2021  DOSx: 2023   Referring Provider:  Levy Dutton MD [address]  [place] [address]    Medical Diagnosis:   1. Biceps tendon tear          2. Shoulder impingement, left       X = TO BE PERFORMED NEXT VISIT  > = PROGRESS TO THIS  Strength:  Right Shoulder: Flexion 4/5,  Abduction 3+/5, ER 4/5, IR 4+/5       Left Shoulder: Flexion 3+/5,  Abduction 2+/5, ER 4-/5, IR 4+/5   Elbow extension and flexion: 4-/5  S: Pt reports back is also sore  O:  No RTD for shoulder, released from Dr Anthony [id number]     Visit Eval + 9/10  Authorization #[id number]  approved 10 visits   03840, 69031, 53134, 64745  2024 through 2024      Pain 5/10     ROM 2/15/24  Right Shoulder:  AROM: 150° Forward elevation,  90° ER,  IR to T9  PROM: 180° Forward elevation,  105° ER,  35° IR     Left Shoulder:  AROM: 135° Forward elevation,  60° ER,  IR to T11  PROM: 170° Forward elevation,  100° ER , 45° IR     Modalities       Use sparingly if at all.  Prefer an active program.  MO   Manual      Stretching all directions   MT         Stretch      Table slides Added to HEP, review with patient next visit     Wall Flexion  TE   IR stretch behind back ?  TE      TE   Exercise      Pulleys - flex 4 min  TE   Pulleys-IR 3 min  TE    UBE 2/2 min F/B standing    Supine wand chest press 2 x 15 Blk wand TE   Supine wand flex 2 x 15 Blk wand TE   Supine wand ER/IR (goal post) 2 x 15 Blk wand TE    wand IR TE   Supine flexion  TE   S-lying ER  TE    seated wand press 3 x 10 Blk wand TE    seated flexion   circuit TE   Seated wand flexion 2 x 15 circuit    ROWS: H Blue 2 x 15 sitting TA   ROWS: M Blue 2 x 15 sitting TA   ROWS: L Blue 2 x 15 sitting TA   ER (towel roll under arm) Red 2 x 20 sitting TE   IR (towel roll under arm) Red  2 x 20 sitting TE   Flexion in scapular plane      Shelf

## 2024-04-05 RX ORDER — SODIUM CHLORIDE, SODIUM LACTATE, POTASSIUM CHLORIDE, CALCIUM CHLORIDE 600; 310; 30; 20 MG/100ML; MG/100ML; MG/100ML; MG/100ML
INJECTION, SOLUTION INTRAVENOUS CONTINUOUS
OUTPATIENT
Start: 2024-04-05

## 2024-04-05 RX ORDER — SCOLOPAMINE TRANSDERMAL SYSTEM 1 MG/1
1 PATCH, EXTENDED RELEASE TRANSDERMAL
OUTPATIENT
Start: 2024-04-05

## 2024-04-09 ENCOUNTER — HOSPITAL ENCOUNTER (OUTPATIENT)
Dept: GENERAL RADIOLOGY | Age: 64
Discharge: HOME | End: 2024-04-11
Payer: MEDICARE

## 2024-04-09 ENCOUNTER — ANESTHESIA EVENT (OUTPATIENT)
Dept: OPERATING ROOM | Age: 64
End: 2024-04-09
Payer: MEDICARE

## 2024-04-09 ENCOUNTER — HOSPITAL ENCOUNTER (OUTPATIENT)
Dept: PREADMISSION TESTING | Age: 64
Discharge: HOME | End: 2024-04-09
Payer: MEDICARE

## 2024-04-09 VITALS
BODY MASS INDEX: 31.57 KG/M2 | WEIGHT: 246 LBS | DIASTOLIC BLOOD PRESSURE: 87 MMHG | TEMPERATURE: 97.5 F | SYSTOLIC BLOOD PRESSURE: 136 MMHG | HEART RATE: 72 BPM | OXYGEN SATURATION: 95 % | HEIGHT: 74 IN | RESPIRATION RATE: 20 BRPM

## 2024-04-09 DIAGNOSIS — Z01.818 PRE-OP EVALUATION: ICD-10-CM

## 2024-04-09 LAB
ALBUMIN SERPL-MCNC: 4.4 G/DL (ref 3.5–5.2)
ALP SERPL-CCNC: 40 U/L (ref 40–129)
ALT SERPL-CCNC: 22 U/L (ref 0–40)
ANION GAP SERPL CALCULATED.3IONS-SCNC: 13 MMOL/L (ref 7–16)
AST SERPL-CCNC: 18 U/L (ref 0–39)
BASOPHILS # BLD: 0.06 K/UL (ref 0–0.2)
BASOPHILS NFR BLD: 1 % (ref 0–2)
BILIRUB SERPL-MCNC: 0.2 MG/DL (ref 0–1.2)
BILIRUB UR QL STRIP: NEGATIVE
BUN SERPL-MCNC: 23 MG/DL (ref 6–23)
CALCIUM SERPL-MCNC: 10.2 MG/DL (ref 8.6–10.2)
CHLORIDE SERPL-SCNC: 104 MMOL/L (ref 98–107)
CLARITY UR: CLEAR
CO2 SERPL-SCNC: 25 MMOL/L (ref 22–29)
COLOR UR: YELLOW
COMMENT: NORMAL
CREAT SERPL-MCNC: 0.8 MG/DL (ref 0.7–1.2)
EOSINOPHIL # BLD: 0.3 K/UL (ref 0.05–0.5)
EOSINOPHILS RELATIVE PERCENT: 4 % (ref 0–6)
ERYTHROCYTE [DISTWIDTH] IN BLOOD BY AUTOMATED COUNT: 11.9 % (ref 11.5–15)
GFR SERPL CREATININE-BSD FRML MDRD: >90 ML/MIN/1.73M2
GLUCOSE SERPL-MCNC: 106 MG/DL (ref 74–99)
GLUCOSE UR STRIP-MCNC: NEGATIVE MG/DL
HBA1C MFR BLD: 5.5 % (ref 4–5.6)
HCT VFR BLD AUTO: 41.8 % (ref 37–54)
HGB BLD-MCNC: 15.1 G/DL (ref 12.5–16.5)
HGB UR QL STRIP.AUTO: NEGATIVE
IMM GRANULOCYTES # BLD AUTO: 0.03 K/UL (ref 0–0.58)
IMM GRANULOCYTES NFR BLD: 0 % (ref 0–5)
INR PPP: 1
KETONES UR STRIP-MCNC: NEGATIVE MG/DL
LEUKOCYTE ESTERASE UR QL STRIP: NEGATIVE
LYMPHOCYTES NFR BLD: 1.8 K/UL (ref 1.5–4)
LYMPHOCYTES RELATIVE PERCENT: 24 % (ref 20–42)
MCH RBC QN AUTO: 33.6 PG (ref 26–35)
MCHC RBC AUTO-ENTMCNC: 36.1 G/DL (ref 32–34.5)
MCV RBC AUTO: 92.9 FL (ref 80–99.9)
MONOCYTES NFR BLD: 0.71 K/UL (ref 0.1–0.95)
MONOCYTES NFR BLD: 10 % (ref 2–12)
NEUTROPHILS NFR BLD: 61 % (ref 43–80)
NEUTS SEG NFR BLD: 4.48 K/UL (ref 1.8–7.3)
NITRITE UR QL STRIP: NEGATIVE
PARTIAL THROMBOPLASTIN TIME: 30 SEC (ref 24.5–35.1)
PH UR STRIP: 6 [PH] (ref 5–9)
PLATELET # BLD AUTO: 348 K/UL (ref 130–450)
PMV BLD AUTO: 9.1 FL (ref 7–12)
POTASSIUM SERPL-SCNC: 4.6 MMOL/L (ref 3.5–5)
PREALB SERPL-MCNC: 28 MG/DL (ref 20–40)
PROT SERPL-MCNC: 7.1 G/DL (ref 6.4–8.3)
PROT UR STRIP-MCNC: NEGATIVE MG/DL
PROTHROMBIN TIME: 11.2 SEC (ref 9.3–12.4)
RBC # BLD AUTO: 4.5 M/UL (ref 3.8–5.8)
SODIUM SERPL-SCNC: 142 MMOL/L (ref 132–146)
SP GR UR STRIP: 1.02 (ref 1–1.03)
UROBILINOGEN UR STRIP-ACNC: 0.2 EU/DL (ref 0–1)
WBC OTHER # BLD: 7.4 K/UL (ref 4.5–11.5)

## 2024-04-09 PROCEDURE — 87081 CULTURE SCREEN ONLY: CPT

## 2024-04-09 PROCEDURE — 81003 URINALYSIS AUTO W/O SCOPE: CPT

## 2024-04-09 PROCEDURE — 85025 COMPLETE CBC W/AUTO DIFF WBC: CPT

## 2024-04-09 PROCEDURE — 80053 COMPREHEN METABOLIC PANEL: CPT

## 2024-04-09 PROCEDURE — 84134 ASSAY OF PREALBUMIN: CPT

## 2024-04-09 PROCEDURE — 87086 URINE CULTURE/COLONY COUNT: CPT

## 2024-04-09 PROCEDURE — 71046 X-RAY EXAM CHEST 2 VIEWS: CPT

## 2024-04-09 PROCEDURE — 83036 HEMOGLOBIN GLYCOSYLATED A1C: CPT

## 2024-04-09 PROCEDURE — 85610 PROTHROMBIN TIME: CPT

## 2024-04-09 PROCEDURE — 85730 THROMBOPLASTIN TIME PARTIAL: CPT

## 2024-04-09 RX ORDER — ROPIVACAINE HYDROCHLORIDE 5 MG/ML
30 INJECTION, SOLUTION EPIDURAL; INFILTRATION; PERINEURAL ONCE
OUTPATIENT
Start: 2024-04-15

## 2024-04-09 RX ORDER — MIDAZOLAM HYDROCHLORIDE 1 MG/ML
2 INJECTION INTRAMUSCULAR; INTRAVENOUS ONCE
OUTPATIENT
Start: 2024-04-15

## 2024-04-09 RX ORDER — TAURINE 500 MG
1000 CAPSULE ORAL 2 TIMES DAILY
COMMUNITY

## 2024-04-09 RX ORDER — FENTANYL CITRATE 50 UG/ML
100 INJECTION, SOLUTION INTRAMUSCULAR; INTRAVENOUS ONCE
OUTPATIENT
Start: 2024-04-15

## 2024-04-09 ASSESSMENT — PAIN SCALES - GENERAL: PAINLEVEL_OUTOF10: 7

## 2024-04-09 ASSESSMENT — PAIN DESCRIPTION - LOCATION: LOCATION: KNEE

## 2024-04-09 ASSESSMENT — PAIN DESCRIPTION - ORIENTATION: ORIENTATION: LEFT

## 2024-04-09 ASSESSMENT — PAIN DESCRIPTION - PAIN TYPE: TYPE: CHRONIC PAIN

## 2024-04-09 NOTE — ANESTHESIA PRE PROCEDURE
social                                Counseling given: Not Answered      Vital Signs (Current):   Vitals:    04/09/24 0950   BP: 136/87   Pulse: 72   Resp: 20   Temp: 97.5 °F (36.4 °C)   TempSrc: Infrared   SpO2: 95%   Weight: 111.6 kg (246 lb)   Height: 1.88 m (6' 2\")                                              BP Readings from Last 3 Encounters:   04/09/24 136/87   11/27/23 115/75   04/14/23 135/79       NPO Status:                                                                                 BMI:   Wt Readings from Last 3 Encounters:   04/09/24 111.6 kg (246 lb)   03/25/24 110.7 kg (244 lb)   01/09/24 110.7 kg (244 lb)     Body mass index is 31.58 kg/m².    CBC:   Lab Results   Component Value Date/Time    WBC 7.4 04/09/2024 10:40 AM    RBC 4.50 04/09/2024 10:40 AM    HGB 15.1 04/09/2024 10:40 AM    HCT 41.8 04/09/2024 10:40 AM    MCV 92.9 04/09/2024 10:40 AM    RDW 11.9 04/09/2024 10:40 AM     04/09/2024 10:40 AM       CMP:   Lab Results   Component Value Date/Time     03/27/2024 12:00 PM    K 4.5 03/27/2024 12:00 PM    K 4.1 11/11/2020 08:30 AM     03/27/2024 12:00 PM    CO2 24 03/27/2024 12:00 PM    BUN 24 03/27/2024 12:00 PM    CREATININE 0.8 03/27/2024 12:00 PM    GFRAA >60 09/19/2022 02:46 PM    LABGLOM >90 03/27/2024 12:00 PM    GLUCOSE 98 03/27/2024 12:00 PM    GLUCOSE 94 10/24/2011 12:35 PM    PROT 7.3 03/27/2024 12:00 PM    CALCIUM 10.1 03/27/2024 12:00 PM    BILITOT 0.3 03/27/2024 12:00 PM    ALKPHOS 34 03/27/2024 12:00 PM    AST 25 03/27/2024 12:00 PM    ALT 21 03/27/2024 12:00 PM       POC Tests: No results for input(s): \"POCGLU\", \"POCNA\", \"POCK\", \"POCCL\", \"POCBUN\", \"POCHEMO\", \"POCHCT\" in the last 72 hours.    Coags:   Lab Results   Component Value Date/Time    PROTIME 12.3 04/27/2018 12:15 PM    INR 1.1 04/27/2018 12:15 PM       HCG (If Applicable): No results found for: \"PREGTESTUR\", \"PREGSERUM\", \"HCG\", \"HCGQUANT\"     ABGs: No results found for: \"PHART\", \"PO2ART\", \"CMQ6ETH\",

## 2024-04-09 NOTE — PROGRESS NOTES
Patient attended preoperative Total Joint Camp on 4/9/2024.  Patient is scheduled to have an elective knee replacement.  Patient was educated regarding Disease Process, Medications, Smoking Cessation, Oxygenation, Incentive Spirometry and Deep Breath and Cough, signs and symptoms of postoperative joint infection that include: Fever, Chills, Pain Control, Drainage and Redness, post-op follow up with orthopaedic surgeon, dressing removal, staple removal, ambulatory devices which include a wheeled walker and cane, bed mobility, correct anatomical alignment, active range of motion, proper transferring technique, incision care, infection prevention measures, non-pharmacologic comfort measures, notification of inadequate pain control measures, pain scale for assessing level of pain, pharmacologic pain management, relaxation techniques.

## 2024-04-09 NOTE — PROGRESS NOTES
[facility]                                                                                                                    PRE OP INSTRUCTIONS FOR  Per Stroud        Date: 4/9/2024    Date of surgery: 4/15/24   Arrival Time: Hospital will call you between 5pm and 7pm with your final arrival time for surgery    You may drink clear liquids up until 2 hours before your procedure. Clear liquids include water, black coffee, and apple juice. No solid foods for 8 hours pre procedure.     The night before surgery drink 2- 24 oz bottles of regular Gatorade at bedtime. Drink both within 10-15 minutes. (Insulin dependent patients drink 2- 24 oz bottles of sugar free Gatorade.)     Take the following medications with a small sip of water on the morning of Surgery: gabapentin, lansoprazole     Aspirin, Ibuprofen, Advil, Naproxen, Vitamin E and other Anti-inflammatory products should be stopped  before surgery  as directed by your physician.  Take Tylenol only unless instructed otherwise by your surgeon. Stop all herbal supplements 5 days pre op.     Do not smoke,use illicit drugs and do not drink any alcoholic beverages 24 hours prior to surgery.    You may brush your teeth the morning of surgery.  DO NOT SWALLOW WATER    You MUST make arrangements for a responsible adult to take you home after your surgery. You will not be allowed to leave alone or drive yourself home.  It is strongly suggested someone stay with you the first 24 hrs. Your surgery will be cancelled if you do not have a ride home.      Please wear simple, loose fitting clothing to the hospital.  Do not bring valuables (money, credit cards, checkbooks, etc.) Do not wear any makeup (including no eye makeup) or nail polish on your fingers or toes.    DO NOT wear any jewelry or piercings on day of surgery.  All body piercing jewelry must be removed.    Shower the night before surgery with _x__Antibacterial soap /Antiseptic

## 2024-04-10 LAB
MICROORGANISM SPEC CULT: NO GROWTH
SPECIMEN DESCRIPTION: NORMAL

## 2024-04-11 LAB
MICROORGANISM SPEC CULT: NORMAL
SPECIMEN DESCRIPTION: NORMAL

## 2024-04-11 NOTE — CARE COORDINATION
04/11/24 1044   Social/Functional History   Lives With Spouse   Type of Home House   Home Layout One level   Home Access Stairs to enter with rails   Entrance Stairs - Number of Steps 3 steps to enter- 1 rail   Entrance Stairs - Rails Right   Bathroom Shower/Tub Tub/Shower unit   Home Equipment Walker, rolling   Condition of Participation: Discharge Planning   The Plan for Transition of Care is related to the following treatment goals: HHC/DME   The Patient and/or Patient Representative was provided with a Choice of Provider? Patient   The Patient and/Or Patient Representative agree with the Discharge Plan? Yes   Freedom of Choice list was provided with basic dialogue that supports the patient's individualized plan of care/goals, treatment preferences, and shares the quality data associated with the providers?  Yes     4/11/2024: SS NOTE:  Met with pt in PAT, pt having surgery for a elective total knee arthroplasty on 4/15 , explained sw role for transition of care and reviewed rehab needs and providers for [employer] insurance, pt plans to return home day of surgery from [facility] , pt will have help from his spouse who will transport pt home, pt prefers [facility], referrals made to Alvino Razo for [facility], who will follow post-op for home PT orders and to Clare at [facility] for a bsc to be delivered to [facility] for pt to take home prior to discharge, pt declined ttb, bathrom too small, will sponge bathe.Nursing informed.Electronically signed by JOCELINE Raza on 4/11/2024 at 10:44 AM

## 2024-04-15 ENCOUNTER — ANESTHESIA (OUTPATIENT)
Dept: OPERATING ROOM | Age: 64
End: 2024-04-15
Payer: MEDICARE

## 2024-04-15 ENCOUNTER — APPOINTMENT (OUTPATIENT)
Dept: GENERAL RADIOLOGY | Age: 64
End: 2024-04-15
Attending: ORTHOPAEDIC SURGERY
Payer: MEDICARE

## 2024-04-15 ENCOUNTER — HOSPITAL ENCOUNTER (OUTPATIENT)
Age: 64
Setting detail: OUTPATIENT SURGERY
Discharge: HOME | End: 2024-04-15
Attending: ORTHOPAEDIC SURGERY | Admitting: ORTHOPAEDIC SURGERY
Payer: MEDICARE

## 2024-04-15 VITALS
DIASTOLIC BLOOD PRESSURE: 74 MMHG | HEIGHT: 74 IN | TEMPERATURE: 98 F | HEART RATE: 80 BPM | WEIGHT: 246 LBS | BODY MASS INDEX: 31.57 KG/M2 | OXYGEN SATURATION: 92 % | RESPIRATION RATE: 16 BRPM | SYSTOLIC BLOOD PRESSURE: 134 MMHG

## 2024-04-15 DIAGNOSIS — Z01.818 PRE-OP EVALUATION: Primary | ICD-10-CM

## 2024-04-15 DIAGNOSIS — G89.18 POST-OP PAIN: ICD-10-CM

## 2024-04-15 DIAGNOSIS — M17.12 LEFT KNEE DJD: ICD-10-CM

## 2024-04-15 PROCEDURE — 2580000003 HC RX 258: Performed by: ANESTHESIOLOGY

## 2024-04-15 PROCEDURE — C1776 JOINT DEVICE (IMPLANTABLE): HCPCS | Performed by: ORTHOPAEDIC SURGERY

## 2024-04-15 PROCEDURE — 7100000001 HC PACU RECOVERY - ADDTL 15 MIN: Performed by: ORTHOPAEDIC SURGERY

## 2024-04-15 PROCEDURE — 6360000002 HC RX W HCPCS

## 2024-04-15 PROCEDURE — 6360000002 HC RX W HCPCS: Performed by: NURSE ANESTHETIST, CERTIFIED REGISTERED

## 2024-04-15 PROCEDURE — 2580000003 HC RX 258: Performed by: NURSE ANESTHETIST, CERTIFIED REGISTERED

## 2024-04-15 PROCEDURE — 6360000002 HC RX W HCPCS: Performed by: ANESTHESIOLOGY

## 2024-04-15 PROCEDURE — 6360000002 HC RX W HCPCS: Performed by: ORTHOPAEDIC SURGERY

## 2024-04-15 PROCEDURE — 3600000015 HC SURGERY LEVEL 5 ADDTL 15MIN: Performed by: ORTHOPAEDIC SURGERY

## 2024-04-15 PROCEDURE — 64447 NJX AA&/STRD FEMORAL NRV IMG: CPT | Performed by: ANESTHESIOLOGY

## 2024-04-15 PROCEDURE — 2580000003 HC RX 258

## 2024-04-15 PROCEDURE — 3600000005 HC SURGERY LEVEL 5 BASE: Performed by: ORTHOPAEDIC SURGERY

## 2024-04-15 PROCEDURE — 97110 THERAPEUTIC EXERCISES: CPT | Performed by: PHYSICAL THERAPIST

## 2024-04-15 PROCEDURE — 97165 OT EVAL LOW COMPLEX 30 MIN: CPT

## 2024-04-15 PROCEDURE — C1713 ANCHOR/SCREW BN/BN,TIS/BN: HCPCS | Performed by: ORTHOPAEDIC SURGERY

## 2024-04-15 PROCEDURE — 3700000000 HC ANESTHESIA ATTENDED CARE: Performed by: ORTHOPAEDIC SURGERY

## 2024-04-15 PROCEDURE — 3700000001 HC ADD 15 MINUTES (ANESTHESIA): Performed by: ORTHOPAEDIC SURGERY

## 2024-04-15 PROCEDURE — 6370000000 HC RX 637 (ALT 250 FOR IP): Performed by: ANESTHESIOLOGY

## 2024-04-15 PROCEDURE — 27447 TOTAL KNEE ARTHROPLASTY: CPT | Performed by: ORTHOPAEDIC SURGERY

## 2024-04-15 PROCEDURE — 6370000000 HC RX 637 (ALT 250 FOR IP)

## 2024-04-15 PROCEDURE — 88304 TISSUE EXAM BY PATHOLOGIST: CPT

## 2024-04-15 PROCEDURE — 7100000000 HC PACU RECOVERY - FIRST 15 MIN: Performed by: ORTHOPAEDIC SURGERY

## 2024-04-15 PROCEDURE — 88311 DECALCIFY TISSUE: CPT

## 2024-04-15 PROCEDURE — 97530 THERAPEUTIC ACTIVITIES: CPT | Performed by: PHYSICAL THERAPIST

## 2024-04-15 PROCEDURE — 2580000003 HC RX 258: Performed by: ORTHOPAEDIC SURGERY

## 2024-04-15 PROCEDURE — 2500000003 HC RX 250 WO HCPCS

## 2024-04-15 PROCEDURE — 7100000011 HC PHASE II RECOVERY - ADDTL 15 MIN: Performed by: ORTHOPAEDIC SURGERY

## 2024-04-15 PROCEDURE — 97161 PT EVAL LOW COMPLEX 20 MIN: CPT | Performed by: PHYSICAL THERAPIST

## 2024-04-15 PROCEDURE — 7100000010 HC PHASE II RECOVERY - FIRST 15 MIN: Performed by: ORTHOPAEDIC SURGERY

## 2024-04-15 PROCEDURE — 73560 X-RAY EXAM OF KNEE 1 OR 2: CPT

## 2024-04-15 PROCEDURE — 97535 SELF CARE MNGMENT TRAINING: CPT

## 2024-04-15 PROCEDURE — 6370000000 HC RX 637 (ALT 250 FOR IP): Performed by: ORTHOPAEDIC SURGERY

## 2024-04-15 PROCEDURE — 2709999900 HC NON-CHARGEABLE SUPPLY: Performed by: ORTHOPAEDIC SURGERY

## 2024-04-15 DEVICE — CEMENT BNE 40 GM RADIOPAQUE BA SIMPLEX P: Type: IMPLANTABLE DEVICE | Site: KNEE | Status: FUNCTIONAL

## 2024-04-15 DEVICE — LEGION PS OXINIUM FEMORAL SZ 7 LEFT
Type: IMPLANTABLE DEVICE | Site: KNEE | Status: FUNCTIONAL
Brand: LEGION

## 2024-04-15 DEVICE — GENESIS II RESURFACING PATELLAR                                    PROSTHESIS  32MM
Type: IMPLANTABLE DEVICE | Site: KNEE | Status: FUNCTIONAL
Brand: GENESIS II

## 2024-04-15 DEVICE — LEGION PS HIGH FLEX XLPE SZ 7-8 10MM
Type: IMPLANTABLE DEVICE | Site: KNEE | Status: FUNCTIONAL
Brand: LEGION

## 2024-04-15 DEVICE — GENESIS II NON-POROUS TIBIAL                                    BASEPLATE SIZE 7 LEFT
Type: IMPLANTABLE DEVICE | Site: KNEE | Status: FUNCTIONAL
Brand: GENESIS II

## 2024-04-15 DEVICE — KNEE K1 TOT HEMI STD CEM IMPL CAPPED K1 SN: Type: IMPLANTABLE DEVICE | Site: KNEE | Status: FUNCTIONAL

## 2024-04-15 RX ORDER — PROCHLORPERAZINE EDISYLATE 5 MG/ML
5 INJECTION INTRAMUSCULAR; INTRAVENOUS
Status: DISCONTINUED | OUTPATIENT
Start: 2024-04-15 | End: 2024-04-15 | Stop reason: HOSPADM

## 2024-04-15 RX ORDER — PROPOFOL 10 MG/ML
INJECTION, EMULSION INTRAVENOUS PRN
Status: DISCONTINUED | OUTPATIENT
Start: 2024-04-15 | End: 2024-04-15 | Stop reason: SDUPTHER

## 2024-04-15 RX ORDER — ACETAMINOPHEN 500 MG
1000 TABLET ORAL ONCE
Status: COMPLETED | OUTPATIENT
Start: 2024-04-15 | End: 2024-04-15

## 2024-04-15 RX ORDER — ROPIVACAINE HYDROCHLORIDE 5 MG/ML
30 INJECTION, SOLUTION EPIDURAL; INFILTRATION; PERINEURAL ONCE
Status: COMPLETED | OUTPATIENT
Start: 2024-04-15 | End: 2024-04-15

## 2024-04-15 RX ORDER — SODIUM CHLORIDE 0.9 % (FLUSH) 0.9 %
5-40 SYRINGE (ML) INJECTION PRN
Status: DISCONTINUED | OUTPATIENT
Start: 2024-04-15 | End: 2024-04-15 | Stop reason: HOSPADM

## 2024-04-15 RX ORDER — ASPIRIN 325 MG
325 TABLET, DELAYED RELEASE (ENTERIC COATED) ORAL 2 TIMES DAILY
Status: DISCONTINUED | OUTPATIENT
Start: 2024-04-16 | End: 2024-04-15 | Stop reason: HOSPADM

## 2024-04-15 RX ORDER — VANCOMYCIN HYDROCHLORIDE 1 G/20ML
INJECTION, POWDER, LYOPHILIZED, FOR SOLUTION INTRAVENOUS PRN
Status: DISCONTINUED | OUTPATIENT
Start: 2024-04-15 | End: 2024-04-15 | Stop reason: HOSPADM

## 2024-04-15 RX ORDER — SODIUM CHLORIDE 9 MG/ML
INJECTION, SOLUTION INTRAVENOUS PRN
Status: DISCONTINUED | OUTPATIENT
Start: 2024-04-15 | End: 2024-04-15 | Stop reason: HOSPADM

## 2024-04-15 RX ORDER — KETOROLAC TROMETHAMINE 15 MG/ML
15 INJECTION, SOLUTION INTRAMUSCULAR; INTRAVENOUS ONCE
Status: COMPLETED | OUTPATIENT
Start: 2024-04-15 | End: 2024-04-15

## 2024-04-15 RX ORDER — LABETALOL HYDROCHLORIDE 5 MG/ML
10 INJECTION, SOLUTION INTRAVENOUS
Status: DISCONTINUED | OUTPATIENT
Start: 2024-04-15 | End: 2024-04-15 | Stop reason: HOSPADM

## 2024-04-15 RX ORDER — ONDANSETRON 4 MG/1
4 TABLET, ORALLY DISINTEGRATING ORAL EVERY 8 HOURS PRN
Status: DISCONTINUED | OUTPATIENT
Start: 2024-04-15 | End: 2024-04-15 | Stop reason: HOSPADM

## 2024-04-15 RX ORDER — DEXAMETHASONE SODIUM PHOSPHATE 10 MG/ML
INJECTION, SOLUTION INTRAMUSCULAR; INTRAVENOUS
Status: COMPLETED | OUTPATIENT
Start: 2024-04-15 | End: 2024-04-15

## 2024-04-15 RX ORDER — OXYCODONE HYDROCHLORIDE 5 MG/1
10 TABLET ORAL EVERY 4 HOURS PRN
Status: DISCONTINUED | OUTPATIENT
Start: 2024-04-15 | End: 2024-04-15 | Stop reason: HOSPADM

## 2024-04-15 RX ORDER — SCOLOPAMINE TRANSDERMAL SYSTEM 1 MG/1
1 PATCH, EXTENDED RELEASE TRANSDERMAL
Status: DISCONTINUED | OUTPATIENT
Start: 2024-04-15 | End: 2024-04-15 | Stop reason: HOSPADM

## 2024-04-15 RX ORDER — MEPERIDINE HYDROCHLORIDE 25 MG/ML
12.5 INJECTION INTRAMUSCULAR; INTRAVENOUS; SUBCUTANEOUS EVERY 5 MIN PRN
Status: COMPLETED | OUTPATIENT
Start: 2024-04-15 | End: 2024-04-15

## 2024-04-15 RX ORDER — POTASSIUM CHLORIDE 1.5 G/1.58G
20 POWDER, FOR SOLUTION ORAL DAILY
Status: CANCELLED | OUTPATIENT
Start: 2024-04-15

## 2024-04-15 RX ORDER — IPRATROPIUM BROMIDE AND ALBUTEROL SULFATE 2.5; .5 MG/3ML; MG/3ML
1 SOLUTION RESPIRATORY (INHALATION)
Status: COMPLETED | OUTPATIENT
Start: 2024-04-15 | End: 2024-04-15

## 2024-04-15 RX ORDER — DEXTROSE AND SODIUM CHLORIDE 5; .45 G/100ML; G/100ML
INJECTION, SOLUTION INTRAVENOUS CONTINUOUS
Status: DISCONTINUED | OUTPATIENT
Start: 2024-04-15 | End: 2024-04-15 | Stop reason: HOSPADM

## 2024-04-15 RX ORDER — SODIUM CHLORIDE 0.9 % (FLUSH) 0.9 %
5-40 SYRINGE (ML) INJECTION EVERY 12 HOURS SCHEDULED
Status: DISCONTINUED | OUTPATIENT
Start: 2024-04-15 | End: 2024-04-15 | Stop reason: HOSPADM

## 2024-04-15 RX ORDER — METHOCARBAMOL 100 MG/ML
INJECTION, SOLUTION INTRAMUSCULAR; INTRAVENOUS
Status: COMPLETED
Start: 2024-04-15 | End: 2024-04-15

## 2024-04-15 RX ORDER — FENOFIBRATE 160 MG/1
160 TABLET ORAL DAILY
Status: CANCELLED | OUTPATIENT
Start: 2024-04-15

## 2024-04-15 RX ORDER — ASPIRIN 325 MG
325 TABLET, DELAYED RELEASE (ENTERIC COATED) ORAL 2 TIMES DAILY
Qty: 56 TABLET | Refills: 0 | Status: SHIPPED | OUTPATIENT
Start: 2024-04-15 | End: 2024-05-13

## 2024-04-15 RX ORDER — ROPIVACAINE HYDROCHLORIDE 5 MG/ML
INJECTION, SOLUTION EPIDURAL; INFILTRATION; PERINEURAL
Status: COMPLETED | OUTPATIENT
Start: 2024-04-15 | End: 2024-04-15

## 2024-04-15 RX ORDER — BUPIVACAINE HYDROCHLORIDE 7.5 MG/ML
INJECTION, SOLUTION INTRASPINAL PRN
Status: DISCONTINUED | OUTPATIENT
Start: 2024-04-15 | End: 2024-04-15 | Stop reason: SDUPTHER

## 2024-04-15 RX ORDER — CELECOXIB 100 MG/1
100 CAPSULE ORAL 2 TIMES DAILY
Qty: 60 CAPSULE | Refills: 0 | Status: SHIPPED | OUTPATIENT
Start: 2024-04-15

## 2024-04-15 RX ORDER — ACETAMINOPHEN 325 MG/1
650 TABLET ORAL EVERY 6 HOURS
Status: DISCONTINUED | OUTPATIENT
Start: 2024-04-16 | End: 2024-04-15 | Stop reason: HOSPADM

## 2024-04-15 RX ORDER — GABAPENTIN 100 MG/1
100 CAPSULE ORAL 3 TIMES DAILY
Qty: 90 CAPSULE | Refills: 0 | Status: SHIPPED | OUTPATIENT
Start: 2024-04-15 | End: 2024-05-15

## 2024-04-15 RX ORDER — MIDAZOLAM HYDROCHLORIDE 1 MG/ML
2 INJECTION INTRAMUSCULAR; INTRAVENOUS ONCE
Status: COMPLETED | OUTPATIENT
Start: 2024-04-15 | End: 2024-04-15

## 2024-04-15 RX ORDER — FENTANYL CITRATE 50 UG/ML
INJECTION, SOLUTION INTRAMUSCULAR; INTRAVENOUS PRN
Status: DISCONTINUED | OUTPATIENT
Start: 2024-04-15 | End: 2024-04-15 | Stop reason: SDUPTHER

## 2024-04-15 RX ORDER — SCOLOPAMINE TRANSDERMAL SYSTEM 1 MG/1
1 PATCH, EXTENDED RELEASE TRANSDERMAL
Status: DISCONTINUED | OUTPATIENT
Start: 2024-04-15 | End: 2024-04-15 | Stop reason: SDUPTHER

## 2024-04-15 RX ORDER — PROPOFOL 10 MG/ML
INJECTION, EMULSION INTRAVENOUS CONTINUOUS PRN
Status: DISCONTINUED | OUTPATIENT
Start: 2024-04-15 | End: 2024-04-15 | Stop reason: SDUPTHER

## 2024-04-15 RX ORDER — DEXAMETHASONE SODIUM PHOSPHATE 10 MG/ML
8 INJECTION, SOLUTION INTRAMUSCULAR; INTRAVENOUS ONCE
Status: COMPLETED | OUTPATIENT
Start: 2024-04-15 | End: 2024-04-15

## 2024-04-15 RX ORDER — MELOXICAM 7.5 MG/1
3.75 TABLET ORAL DAILY
Status: DISCONTINUED | OUTPATIENT
Start: 2024-04-15 | End: 2024-04-15 | Stop reason: HOSPADM

## 2024-04-15 RX ORDER — HYDRALAZINE HYDROCHLORIDE 20 MG/ML
10 INJECTION INTRAMUSCULAR; INTRAVENOUS
Status: DISCONTINUED | OUTPATIENT
Start: 2024-04-15 | End: 2024-04-15 | Stop reason: HOSPADM

## 2024-04-15 RX ORDER — LOSARTAN POTASSIUM 100 MG/1
100 TABLET ORAL DAILY
Status: CANCELLED | OUTPATIENT
Start: 2024-04-15

## 2024-04-15 RX ORDER — SODIUM CHLORIDE, SODIUM LACTATE, POTASSIUM CHLORIDE, CALCIUM CHLORIDE 600; 310; 30; 20 MG/100ML; MG/100ML; MG/100ML; MG/100ML
INJECTION, SOLUTION INTRAVENOUS CONTINUOUS PRN
Status: DISCONTINUED | OUTPATIENT
Start: 2024-04-15 | End: 2024-04-15 | Stop reason: SDUPTHER

## 2024-04-15 RX ORDER — DIPHENHYDRAMINE HYDROCHLORIDE 50 MG/ML
12.5 INJECTION INTRAMUSCULAR; INTRAVENOUS
Status: DISCONTINUED | OUTPATIENT
Start: 2024-04-15 | End: 2024-04-15 | Stop reason: HOSPADM

## 2024-04-15 RX ORDER — ONDANSETRON 2 MG/ML
4 INJECTION INTRAMUSCULAR; INTRAVENOUS EVERY 6 HOURS PRN
Status: DISCONTINUED | OUTPATIENT
Start: 2024-04-15 | End: 2024-04-15 | Stop reason: HOSPADM

## 2024-04-15 RX ORDER — CELECOXIB 100 MG/1
100 CAPSULE ORAL ONCE
Status: COMPLETED | OUTPATIENT
Start: 2024-04-15 | End: 2024-04-15

## 2024-04-15 RX ORDER — MECOBALAMIN 5000 MCG
30 TABLET,DISINTEGRATING ORAL DAILY
Status: CANCELLED | OUTPATIENT
Start: 2024-04-15

## 2024-04-15 RX ORDER — DEXAMETHASONE SODIUM PHOSPHATE 10 MG/ML
INJECTION, SOLUTION INTRAMUSCULAR; INTRAVENOUS
Status: COMPLETED
Start: 2024-04-15 | End: 2024-04-15

## 2024-04-15 RX ORDER — OXYCODONE HYDROCHLORIDE AND ACETAMINOPHEN 5; 325 MG/1; MG/1
1 TABLET ORAL EVERY 6 HOURS PRN
Qty: 28 TABLET | Refills: 0 | Status: SHIPPED | OUTPATIENT
Start: 2024-04-15 | End: 2024-04-22

## 2024-04-15 RX ORDER — FENTANYL CITRATE 50 UG/ML
100 INJECTION, SOLUTION INTRAMUSCULAR; INTRAVENOUS ONCE
Status: COMPLETED | OUTPATIENT
Start: 2024-04-15 | End: 2024-04-15

## 2024-04-15 RX ORDER — METHOCARBAMOL 100 MG/ML
1000 INJECTION, SOLUTION INTRAMUSCULAR; INTRAVENOUS ONCE
Status: COMPLETED | OUTPATIENT
Start: 2024-04-15 | End: 2024-04-15

## 2024-04-15 RX ORDER — EZETIMIBE 10 MG/1
10 TABLET ORAL DAILY
Status: CANCELLED | OUTPATIENT
Start: 2024-04-15

## 2024-04-15 RX ORDER — LANOLIN ALCOHOL/MO/W.PET/CERES
400 CREAM (GRAM) TOPICAL 4 TIMES DAILY
Status: CANCELLED | OUTPATIENT
Start: 2024-04-15

## 2024-04-15 RX ORDER — GABAPENTIN 100 MG/1
100 CAPSULE ORAL ONCE AS NEEDED
Status: COMPLETED | OUTPATIENT
Start: 2024-04-15 | End: 2024-04-15

## 2024-04-15 RX ORDER — SODIUM CHLORIDE, SODIUM LACTATE, POTASSIUM CHLORIDE, CALCIUM CHLORIDE 600; 310; 30; 20 MG/100ML; MG/100ML; MG/100ML; MG/100ML
INJECTION, SOLUTION INTRAVENOUS CONTINUOUS
Status: DISCONTINUED | OUTPATIENT
Start: 2024-04-15 | End: 2024-04-15 | Stop reason: HOSPADM

## 2024-04-15 RX ORDER — MORPHINE SULFATE 10 MG/ML
2 INJECTION, SOLUTION INTRAMUSCULAR; INTRAVENOUS
Status: DISCONTINUED | OUTPATIENT
Start: 2024-04-15 | End: 2024-04-15 | Stop reason: HOSPADM

## 2024-04-15 RX ORDER — NALOXONE HYDROCHLORIDE 0.4 MG/ML
INJECTION, SOLUTION INTRAMUSCULAR; INTRAVENOUS; SUBCUTANEOUS PRN
Status: DISCONTINUED | OUTPATIENT
Start: 2024-04-15 | End: 2024-04-15 | Stop reason: HOSPADM

## 2024-04-15 RX ORDER — HALOPERIDOL 5 MG/ML
1 INJECTION INTRAMUSCULAR
Status: DISCONTINUED | OUTPATIENT
Start: 2024-04-15 | End: 2024-04-15 | Stop reason: HOSPADM

## 2024-04-15 RX ADMIN — METHOCARBAMOL 1000 MG: 100 INJECTION INTRAMUSCULAR; INTRAVENOUS at 10:15

## 2024-04-15 RX ADMIN — HYDROMORPHONE HYDROCHLORIDE 0.25 MG: 0.5 INJECTION, SOLUTION INTRAMUSCULAR; INTRAVENOUS; SUBCUTANEOUS at 10:33

## 2024-04-15 RX ADMIN — SODIUM CHLORIDE, POTASSIUM CHLORIDE, SODIUM LACTATE AND CALCIUM CHLORIDE: 600; 310; 30; 20 INJECTION, SOLUTION INTRAVENOUS at 07:00

## 2024-04-15 RX ADMIN — ROPIVACAINE HYDROCHLORIDE 30 ML: 5 INJECTION, SOLUTION EPIDURAL; INFILTRATION; PERINEURAL at 06:52

## 2024-04-15 RX ADMIN — DEXAMETHASONE SODIUM PHOSPHATE 10 MG: 10 INJECTION, SOLUTION INTRAMUSCULAR; INTRAVENOUS at 06:51

## 2024-04-15 RX ADMIN — MEPERIDINE HYDROCHLORIDE 12.5 MG: 25 INJECTION INTRAMUSCULAR; INTRAVENOUS; SUBCUTANEOUS at 10:17

## 2024-04-15 RX ADMIN — FENTANYL CITRATE 50 MCG: 50 INJECTION, SOLUTION INTRAMUSCULAR; INTRAVENOUS at 07:02

## 2024-04-15 RX ADMIN — SODIUM CHLORIDE, POTASSIUM CHLORIDE, SODIUM LACTATE AND CALCIUM CHLORIDE: 600; 310; 30; 20 INJECTION, SOLUTION INTRAVENOUS at 05:38

## 2024-04-15 RX ADMIN — FENTANYL CITRATE 100 MCG: 50 INJECTION INTRAMUSCULAR; INTRAVENOUS at 06:49

## 2024-04-15 RX ADMIN — SODIUM CHLORIDE, POTASSIUM CHLORIDE, SODIUM LACTATE AND CALCIUM CHLORIDE: 600; 310; 30; 20 INJECTION, SOLUTION INTRAVENOUS at 08:04

## 2024-04-15 RX ADMIN — ACETAMINOPHEN 1000 MG: 500 TABLET ORAL at 05:39

## 2024-04-15 RX ADMIN — GABAPENTIN 100 MG: 100 CAPSULE ORAL at 15:00

## 2024-04-15 RX ADMIN — PROPOFOL INJECTABLE EMULSION 75 MCG/KG/MIN: 10 INJECTION, EMULSION INTRAVENOUS at 07:11

## 2024-04-15 RX ADMIN — HYDROMORPHONE HYDROCHLORIDE 0.25 MG: 0.5 INJECTION, SOLUTION INTRAMUSCULAR; INTRAVENOUS; SUBCUTANEOUS at 10:20

## 2024-04-15 RX ADMIN — MEPERIDINE HYDROCHLORIDE 12.5 MG: 25 INJECTION INTRAMUSCULAR; INTRAVENOUS; SUBCUTANEOUS at 10:50

## 2024-04-15 RX ADMIN — CEFAZOLIN 2000 MG: 2 INJECTION, POWDER, FOR SOLUTION INTRAMUSCULAR; INTRAVENOUS at 10:28

## 2024-04-15 RX ADMIN — CELECOXIB 100 MG: 100 CAPSULE ORAL at 05:39

## 2024-04-15 RX ADMIN — PROPOFOL INJECTABLE EMULSION 100 MG: 10 INJECTION, EMULSION INTRAVENOUS at 07:10

## 2024-04-15 RX ADMIN — METHOCARBAMOL 1000 MG: 100 INJECTION, SOLUTION INTRAMUSCULAR; INTRAVENOUS at 10:15

## 2024-04-15 RX ADMIN — FENTANYL CITRATE 25 MCG: 50 INJECTION, SOLUTION INTRAMUSCULAR; INTRAVENOUS at 07:05

## 2024-04-15 RX ADMIN — PROPOFOL 75 MCG/KG/MIN: 10 INJECTION, EMULSION INTRAVENOUS at 08:13

## 2024-04-15 RX ADMIN — HYDROMORPHONE HYDROCHLORIDE 0.5 MG: 0.5 INJECTION, SOLUTION INTRAMUSCULAR; INTRAVENOUS; SUBCUTANEOUS at 09:39

## 2024-04-15 RX ADMIN — MEPERIDINE HYDROCHLORIDE 12.5 MG: 25 INJECTION INTRAMUSCULAR; INTRAVENOUS; SUBCUTANEOUS at 10:10

## 2024-04-15 RX ADMIN — HYDROMORPHONE HYDROCHLORIDE 0.25 MG: 0.5 INJECTION, SOLUTION INTRAMUSCULAR; INTRAVENOUS; SUBCUTANEOUS at 10:28

## 2024-04-15 RX ADMIN — MEPERIDINE HYDROCHLORIDE 12.5 MG: 25 INJECTION INTRAMUSCULAR; INTRAVENOUS; SUBCUTANEOUS at 10:55

## 2024-04-15 RX ADMIN — DEXAMETHASONE SODIUM PHOSPHATE 8 MG: 10 INJECTION, SOLUTION INTRAMUSCULAR; INTRAVENOUS at 05:39

## 2024-04-15 RX ADMIN — MIDAZOLAM 2 MG: 1 INJECTION INTRAMUSCULAR; INTRAVENOUS at 06:49

## 2024-04-15 RX ADMIN — IPRATROPIUM BROMIDE AND ALBUTEROL SULFATE 1 DOSE: 2.5; .5 SOLUTION RESPIRATORY (INHALATION) at 10:40

## 2024-04-15 RX ADMIN — KETOROLAC TROMETHAMINE 15 MG: 15 INJECTION, SOLUTION INTRAMUSCULAR; INTRAVENOUS at 09:46

## 2024-04-15 RX ADMIN — CEFAZOLIN 2000 MG: 2 INJECTION, POWDER, FOR SOLUTION INTRAMUSCULAR; INTRAVENOUS at 07:00

## 2024-04-15 RX ADMIN — PROPOFOL INJECTABLE EMULSION 30 MG: 10 INJECTION, EMULSION INTRAVENOUS at 08:05

## 2024-04-15 RX ADMIN — ROPIVACAINE HYDROCHLORIDE 20 ML: 5 INJECTION, SOLUTION EPIDURAL; INFILTRATION; PERINEURAL at 06:51

## 2024-04-15 RX ADMIN — BUPIVACAINE HYDROCHLORIDE IN DEXTROSE 2 ML: 7.5 INJECTION, SOLUTION SUBARACHNOID at 07:08

## 2024-04-15 RX ADMIN — MELOXICAM 3.75 MG: 7.5 TABLET ORAL at 13:11

## 2024-04-15 RX ADMIN — FENTANYL CITRATE 25 MCG: 50 INJECTION, SOLUTION INTRAMUSCULAR; INTRAVENOUS at 07:08

## 2024-04-15 RX ADMIN — OXYCODONE 10 MG: 5 TABLET ORAL at 11:51

## 2024-04-15 RX ADMIN — HYDROMORPHONE HYDROCHLORIDE 0.5 MG: 0.5 INJECTION, SOLUTION INTRAMUSCULAR; INTRAVENOUS; SUBCUTANEOUS at 09:50

## 2024-04-15 ASSESSMENT — PAIN SCALES - GENERAL
PAINLEVEL_OUTOF10: 10
PAINLEVEL_OUTOF10: 9
PAINLEVEL_OUTOF10: 6
PAINLEVEL_OUTOF10: 4
PAINLEVEL_OUTOF10: 5
PAINLEVEL_OUTOF10: 10
PAINLEVEL_OUTOF10: 7
PAINLEVEL_OUTOF10: 7
PAINLEVEL_OUTOF10: 6
PAINLEVEL_OUTOF10: 7
PAINLEVEL_OUTOF10: 5
PAINLEVEL_OUTOF10: 10
PAINLEVEL_OUTOF10: 5

## 2024-04-15 ASSESSMENT — PAIN DESCRIPTION - FREQUENCY
FREQUENCY: CONTINUOUS

## 2024-04-15 ASSESSMENT — PAIN DESCRIPTION - ONSET
ONSET: ON-GOING

## 2024-04-15 ASSESSMENT — PAIN DESCRIPTION - ORIENTATION
ORIENTATION: LEFT

## 2024-04-15 ASSESSMENT — PAIN - FUNCTIONAL ASSESSMENT
PAIN_FUNCTIONAL_ASSESSMENT: ACTIVITIES ARE NOT PREVENTED
PAIN_FUNCTIONAL_ASSESSMENT: 0-10
PAIN_FUNCTIONAL_ASSESSMENT: ACTIVITIES ARE NOT PREVENTED
PAIN_FUNCTIONAL_ASSESSMENT: ACTIVITIES ARE NOT PREVENTED
PAIN_FUNCTIONAL_ASSESSMENT: 0-10
PAIN_FUNCTIONAL_ASSESSMENT: ACTIVITIES ARE NOT PREVENTED
PAIN_FUNCTIONAL_ASSESSMENT: ACTIVITIES ARE NOT PREVENTED
PAIN_FUNCTIONAL_ASSESSMENT: 0-10

## 2024-04-15 ASSESSMENT — PAIN DESCRIPTION - DESCRIPTORS
DESCRIPTORS: SORE
DESCRIPTORS: ACHING;DISCOMFORT;DULL
DESCRIPTORS: SORE
DESCRIPTORS: DISCOMFORT;SORE
DESCRIPTORS: ACHING;DISCOMFORT
DESCRIPTORS: SORE
DESCRIPTORS: DULL;SORE
DESCRIPTORS: SORE
DESCRIPTORS: ACHING;DISCOMFORT
DESCRIPTORS: ACHING;DISCOMFORT
DESCRIPTORS: ACHING;SORE

## 2024-04-15 ASSESSMENT — PAIN DESCRIPTION - PAIN TYPE
TYPE: SURGICAL PAIN

## 2024-04-15 ASSESSMENT — PAIN DESCRIPTION - LOCATION
LOCATION: KNEE

## 2024-04-15 NOTE — ANESTHESIA POSTPROCEDURE EVALUATION
Department of Anesthesiology  Postprocedure Note    Patient: Per Stroud  MRN: [id number]  YOB: 1960  Date of evaluation: 4/15/2024    Procedure Summary       Date: 04/15/24 Room / Location: [address]    Anesthesia Start: 0659 Anesthesia Stop: 0931    Procedure: LEFT KNEE TOTAL KNEE ARTHROPLASTY-SMITH AND NEPHEW (Left: Knee) Diagnosis:       Left knee DJD      (Left knee DJD [M17.12])    Surgeons: Dhaval Molina DO Responsible Provider: Viraj White MD    Anesthesia Type: spinal, regional ASA Status: 3            Anesthesia Type: No value filed.    Nemesio Phase I: Nemesio Score: 10    Nemesio Phase II: Nemesio Score: 10    Anesthesia Post Evaluation    Patient location during evaluation: PACU  Patient participation: complete - patient participated  Level of consciousness: awake  Pain score: 4  Airway patency: patent  Nausea & Vomiting: no vomiting and no nausea  Cardiovascular status: hemodynamically stable  Respiratory status: acceptable  Hydration status: stable  Pain management: adequate        No notable events documented.

## 2024-04-15 NOTE — DISCHARGE INSTRUCTIONS
[facility] Department of Orthopedics  [address]   [place] [address]    MD Grady Gupta DO K Brian Williams, DO    Orthopaedics Discharge Instructions   WBAT on left lower extremity  Pain medication Per Prescriptions  Contact Office for Medication Refill- [phone]  Office can refill pain med every 7 days  If patient discharging to facility then pain control will be continued per facility physician  Ice to operative/injured site for 15-30 minutes of each hour for next 5 days    Recommend that you continue to ice the area 2-3 times per day after this   Elevate operative/injured limb on 2 pillows at home  Goal is to have limb above the heart if able  Wound care -dressings remain clean dry intact.  On postoperative day 7 can be removed and surgical site can be clean with soapy water.  DVT prophylaxis,  mg BID.      Follow Up in Office in 2 weeks.       Call the office at [phone] for directions or with any questions.  Watch for these significant complications.  Call physician if they or any other problems occur:  Fever over 101°, redness, swelling or warmth at the operative site  Unrelieved nausea    Foul smelling or cloudy drainage at the operative site   Unrelieved pain    Blood soaked dressing. (Some oozing may be normal)     Numb, pale, blue, cold or tingling extremity

## 2024-04-15 NOTE — PROGRESS NOTES
640 to pacu for left adductor canal block by dr driver connected to all monitors and O2 applied at 3 liters nasal canula.  648 time out performed and premedicated per orders  651 block started by dr driver using ultrasound  652 30 ml 0.5% ropivacaine with 10 mg decadron PF injected to right adductor marian well

## 2024-04-15 NOTE — CARE COORDINATION
4/15/2024: SS NOTE:  SS Consult for post-op d/c planning and HHC/DME orders noted, pt seen in PAT, d/c plan to return home with [facility] for home PT for start of care on 4/16 & [facility] delivered a bsc to [facility] for pt to take home prior to discharge, [facility] notified.Electronically signed by JOCELINE Raza on 4/15/2024 at 12:34 PM

## 2024-04-15 NOTE — H&P
Updated H&P    Chief Complaint   Patient presents with    Knee Pain       Left Knee F/U, pending surgery on 04/15/2024         Subjective:     Patient ID: Per Stroud is a 64 y.o..  male     Knee Pain  Patient presented for follow up left knee pain. He is about the same as when I saw him last. He has trouble going up and down stairs. He has had prior CSI, knee arthroscopy without relief.     Past Medical History        Past Medical History:   Diagnosis Date    Acid reflux      GERD (gastroesophageal reflux disease)      Hyperlipidemia      Hypertension      Hypokalemia      Hypomagnesemia      Low back pain           Past Surgical History         Past Surgical History:   Procedure Laterality Date    ANESTHESIA NERVE BLOCK Right 01/14/2020     RIGHT L2,3,4 MEDIAL BRANCH BLOCK WITHOUT SEDATION (CPT 04215,32879) performed by Sean Cervantes MD at [facility] OR    ANESTHESIA NERVE BLOCK Right 02/20/2020     RIGHT L2,3,4 MEDIAL BRANCH BLOCK (CPT 51122,29571) performed by Sean Cervantes MD at [facility] OR    ANESTHESIA NERVE BLOCK Left 06/29/2020     LEFT L2 L3 L4 MEDIAL BRANCH BLOCK (CPT 16563) (WANTS AFTERNOON APPOINTMENT) performed by Sean Cervantes MD at [facility] OR    BACK SURGERY         May 4, 2018 decompression with Dr. Daily.     CARPAL TUNNEL RELEASE Left 01/24/2014    CARPAL TUNNEL RELEASE         left    CERVICAL FUSION   11/01/2021    COLONOSCOPY        FRACTURE SURGERY         arm    FRACTURE SURGERY         left arm    HERNIA REPAIR        HERNIA REPAIR         x2 child    KNEE ARTHROSCOPY Left 02/20/2015    NERVE BLOCK Right 01/14/2020    NERVE BLOCK Right 02/20/2020     L2,3,4 medial     NERVE BLOCK Right 05/04/2020     L2,3,4,5 medial radiofrequency     NERVE BLOCK Left 06/29/2020     medial branch block    PAIN MANAGEMENT PROCEDURE Right 05/04/2020     RIGHT L2, 3, 4 MEDIAL BRANCH L5 DORSALRAMUS RADIOFREQUENCY ABLATION performed by Sean Cervantes MD at [facility] OR

## 2024-04-15 NOTE — ANESTHESIA PROCEDURE NOTES
Spinal Block    Patient location during procedure: OB  Reason for block: primary anesthetic and at surgeon's request  Staffing  Performed: resident/CRNA   Anesthesiologist: Viraj White MD  Resident/CRNA: Jayme Griffiths APRN - CRNA  Performed by: Jayme Griffiths APRN - CRNA  Authorized by: Viraj White MD    Spinal Block  Patient position: sitting  Prep: Betadine  Patient monitoring: cardiac monitor, continuous pulse ox, continuous capnometry and frequent blood pressure checks  Approach: midline  Location: L2/L3  Provider prep: mask, sterile gloves and sterile gown  Needle  Needle type: Quincke   Needle gauge: 25 G  Needle length: 3.5 in  Assessment  Sensory level: T6  Swirl obtained: Yes  CSF: clear  Attempts: 1  Hemodynamics: stable  Preanesthetic Checklist  Completed: patient identified, IV checked, site marked, risks and benefits discussed, surgical/procedural consents, equipment checked, pre-op evaluation, timeout performed, anesthesia consent given, oxygen available, monitors applied/VS acknowledged, fire risk safety assessment completed and verbalized and blood product R/B/A discussed and consented

## 2024-04-15 NOTE — ANESTHESIA PROCEDURE NOTES
Peripheral Block    Patient location during procedure: procedure area  Reason for block: post-op pain management and at surgeon's request  Start time: 4/15/2024 6:51 AM  End time: 4/15/2024 6:54 AM  Staffing  Performed: anesthesiologist   Anesthesiologist: Viraj White MD  Performed by: Viraj White MD  Authorized by: Viraj White MD    Preanesthetic Checklist  Completed: patient identified, IV checked, site marked, risks and benefits discussed, surgical/procedural consents, equipment checked, pre-op evaluation, timeout performed, anesthesia consent given, oxygen available, monitors applied/VS acknowledged, fire risk safety assessment completed and verbalized and blood product R/B/A discussed and consented  Peripheral Block   Patient position: supine  Prep: ChloraPrep  Provider prep: mask and sterile gloves  Patient monitoring: cardiac monitor, continuous pulse ox, frequent blood pressure checks, IV access, oxygen and responsive to questions  Block type: Femoral  Adductor canal  Laterality: left  Injection technique: single-shot  Guidance: ultrasound guided    Needle   Needle type: insulated echogenic nerve stimulator needle   Needle gauge: 20 G  Needle localization: ultrasound guidance  Needle length: 10 cm  Assessment   Injection assessment: negative aspiration for heme, no paresthesia on injection, local visualized surrounding nerve on ultrasound and no intravascular symptoms  Paresthesia pain: none  Slow fractionated injection: yes  Hemodynamics: stable  Outcomes: uncomplicated and patient tolerated procedure well    Medications Administered  dexAMETHasone (DECADRON) (PF) 10 mg/mL injection - Other   10 mg - 4/15/2024 6:51:00 AM  ropivacaine (NAROPIN) injection 0.5% - Perineural   20 mL - 4/15/2024 6:51:00 AM

## 2024-04-15 NOTE — PROGRESS NOTES
CLINICAL PHARMACY NOTE: MEDS TO BEDS    Total # of Prescriptions Filled: 4   The following medications were delivered to the patient:  Percocet 5/325 mg  Gabapentin 100 mg  Aspirin 325 mg  Celecoxib 100 mg    Additional Documentation:

## 2024-04-15 NOTE — PROGRESS NOTES
Physical Therapy  Physical Therapy Initial Evaluation/Plan of Care    Room #:  OR POOL/NONE  Patient Name: Per Stroud  YOB: 1960  MRN: [id number]    Date of Service: 4/15/2024     Tentative placement recommendation: Home with Home Health Physical Therapy  Equipment recommendation: Patient has needed equipment       Evaluating Physical Therapist: Neto Linda, PT, DPT #[id number]      Specific Provider Orders/Date/Referring Provider :     04/15/24 0945    PT eval and treat  Start:  04/15/24 0945,   End:  04/15/24 0945,   ONE TIME,   Standing Count:  1 Occurrences,   R       Dhaval Molina DO Acknowledge New    Admitting Diagnosis:   Left knee DJD [M17.12]      Surgery:  L TKA on 4/15/24  Visit Diagnoses         Codes    Pre-op evaluation    -  Primary Z01.818    Post-op pain     G89.18            Patient Active Problem List   Diagnosis    Carpal tunnel syndrome    Wrist pain    Localized osteoarthrosis, lower leg    Medial meniscus tear    Synovitis    DJD (degenerative joint disease) of knee    Cellulitis    Hypertension    Hyperlipidemia    Hypokalemia    Hypomagnesemia    Left arm cellulitis    Chronic pain syndrome    Lumbar facet arthropathy    Lumbar radiculopathy    Lumbar disc disorder    Lumbar postlaminectomy syndrome    Shoulder impingement, left    Biceps tendon tear    Impingement syndrome of left shoulder    Cervical stenosis of spinal canal    Left knee DJD        ASSESSMENT of Current Deficits Patient exhibits decreased strength, balance, and endurance impairing functional mobility, transfers, gait , gait distance, and tolerance to activity. Pt required increased time for mobility and was mildly unsteady with ambulation and stairs in hallway with VC for safety, WW approximation and sequencing. Pt agreeable to seated exercises sitting EOB with VC for technique.        PHYSICAL THERAPY  PLAN OF CARE       Physical therapy plan of care is established based on physician order,

## 2024-04-15 NOTE — OP NOTE
Operative Note      Patient: Per Stroud  YOB: 1960  MRN: [id number]    Date of Procedure: 4/15/2024    Pre-Op Diagnosis Codes:     * Left knee DJD [M17.12]    Post-Op Diagnosis: Same       Procedure(s):  LEFT KNEE TOTAL KNEE ARTHROPLASTY-DUNN AND NEPH    Surgeon(s):  Dhaval Molina DO    Assistant:   First Assistant: Yuly Wheat  Resident: Nathan Michael DO; Anton Workman DO    Anesthesia: General    Estimated Blood Loss (mL): less than 100     Complications: None    Specimens:   ID Type Source Tests Collected by Time Destination   A : BONE LEFT KNEE Bone Bone SURGICAL PATHOLOGY Dhaval Molina DO 4/15/2024 0753        Implants:  Implant Name Type Inv. Item Serial No.  Lot No. LRB No. Used Action   COMPONENT PAT [id number] KNEE POLYETH RESURF GEN II - [id number]  COMPONENT PAT [id number] KNEE POLYETH RESURF GEN II  [employer]- [id number] Left 1 Implanted   COMPONENT FEM SZ 7 L KNEE OXINIUM POST STBL AMANDA LEGION - [id number]  COMPONENT FEM SZ 7 L KNEE OXINIUM POST STBL AMANDA LEGION  [employer]- [id number] Left 1 Implanted   INSERT TIB SZ 7-8 [id number] XLPE POST STBL HI FLX LEGION - [id number]  INSERT TIB SZ 7-8 [id number] XLPE POST STBL HI FLX LEGION  [employer]- [id number] Left 1 Implanted   BASEPLATE TIB SZ 7 AP56MM ML81MM THK2.3MM L KNEE TI ALLY NP - [id number]  BASEPLATE TIB SZ 7 AP56MM ML81MM THK2.3MM L KNEE TI ALLY NP  [employer]- [id number] Left 1 Implanted   CEMENT BNE 40 GM RADIOPAQUE BA SIMPLEX P - [id number]  CEMENT BNE 40 GM RADIOPAQUE BA SIMPLEX P  [employer] [facility]- [id number] Left 1 Implanted         Drains: * No LDAs found *    Findings:  Infection Present At Time Of Surgery (PATOS) (choose all levels that have infection present):  No infection present  Other Findings: as above    Detailed Description of Procedure:   below      Department of Orthopedic

## 2024-04-15 NOTE — PROGRESS NOTES
OCCUPATIONAL THERAPY INITIAL EVALUATION    [facility]  [address] [place]        Date:4/15/2024                                                  Patient Name: Per Stroud    MRN: [id number]    : 1960    Room: OR POOL/NONE      Evaluating OT: Justyn Gómez OTR/L; #[id number]     Referring Provider and Specific Provider Orders/Date:      04/15/24 0945  OT eval and treat  Start:  04/15/24 0945,   End:  04/15/24 0945,   ONE TIME,   Standing Count:  1 Occurrences,   R         Dhaval Molina DO      Placement Recommendation: Home no occupational therapy       Diagnosis:   1. Pre-op evaluation    2. Post-op pain    3. Left knee DJD         Surgery: LEFT KNEE TOTAL KNEE ARTHROPLASTY-4/15/24 DUNN AND NEPHEW       Pertinent Medical History:       Past Medical History:   Diagnosis Date    Acid reflux     GERD (gastroesophageal reflux disease)     Hyperlipidemia     Hypertension     Hypokalemia     Hypomagnesemia     Low back pain          Past Surgical History:   Procedure Laterality Date    ANESTHESIA NERVE BLOCK Right 2020    RIGHT L2,3,4 MEDIAL BRANCH BLOCK WITHOUT SEDATION (CPT 79442,42120) performed by Sean Cervantes MD at [facility] OR    ANESTHESIA NERVE BLOCK Right 2020    RIGHT L2,3,4 MEDIAL BRANCH BLOCK (CPT 61787,93850) performed by Sean Cervantes MD at [facility] OR    ANESTHESIA NERVE BLOCK Left 2020    LEFT L2 L3 L4 MEDIAL BRANCH BLOCK (CPT 97276) (WANTS AFTERNOON APPOINTMENT) performed by Sean Cervantes MD at [facility] OR    BACK SURGERY      May 4, 2018 decompression with Dr. Daily.     CARPAL TUNNEL RELEASE Left 2014    CARPAL TUNNEL RELEASE      left    CERVICAL FUSION  2021    COLONOSCOPY      FRACTURE SURGERY      arm    FRACTURE SURGERY      left arm    HERNIA REPAIR      HERNIA REPAIR      x2 child    KNEE ARTHROSCOPY Left 2015    NERVE BLOCK Right 2020    NERVE BLOCK Right

## 2024-04-18 LAB — SURGICAL PATHOLOGY REPORT: NORMAL

## 2024-04-22 DIAGNOSIS — G89.18 POST-OP PAIN: ICD-10-CM

## 2024-04-22 RX ORDER — OXYCODONE HYDROCHLORIDE AND ACETAMINOPHEN 5; 325 MG/1; MG/1
1 TABLET ORAL EVERY 6 HOURS PRN
Qty: 28 TABLET | Refills: 0 | Status: SHIPPED | OUTPATIENT
Start: 2024-04-22 | End: 2024-04-29

## 2024-04-29 ENCOUNTER — OFFICE VISIT (OUTPATIENT)
Dept: ORTHOPEDIC SURGERY | Age: 64
End: 2024-04-29

## 2024-04-29 VITALS — HEIGHT: 74 IN | BODY MASS INDEX: 31.57 KG/M2 | TEMPERATURE: 98 F | WEIGHT: 246 LBS

## 2024-04-29 DIAGNOSIS — Z96.652 S/P TOTAL KNEE ARTHROPLASTY, LEFT: Primary | ICD-10-CM

## 2024-04-29 DIAGNOSIS — G89.18 POST-OP PAIN: ICD-10-CM

## 2024-04-29 DIAGNOSIS — M17.12 PRIMARY OSTEOARTHRITIS OF LEFT KNEE: Primary | ICD-10-CM

## 2024-04-29 PROCEDURE — 99024 POSTOP FOLLOW-UP VISIT: CPT | Performed by: ORTHOPAEDIC SURGERY

## 2024-04-29 RX ORDER — OXYCODONE HYDROCHLORIDE AND ACETAMINOPHEN 5; 325 MG/1; MG/1
1 TABLET ORAL EVERY 6 HOURS PRN
Qty: 28 TABLET | Refills: 0 | Status: SHIPPED | OUTPATIENT
Start: 2024-04-29 | End: 2024-05-06

## 2024-04-29 RX ORDER — CEPHALEXIN 500 MG/1
500 CAPSULE ORAL 3 TIMES DAILY
Qty: 21 CAPSULE | Refills: 0 | Status: SHIPPED | OUTPATIENT
Start: 2024-04-29 | End: 2024-05-06

## 2024-04-29 NOTE — PROGRESS NOTES
Subjective:     Post-Operative week: 2 Status Post left Total Knee Arthroplasty, surgery date 4/15/24. Systemic or Specific Complaints: swelling with some drainage.    Objective:     General: alert, appears stated age and cooperative   Wound: Wound clean and dry no evidence of infection., No Erythema, No Edema and No Drainage   Motion: Flexion: 0 to 90 Degrees   DVT Exam: No evidence of DVT seen on physical exam.  Negative Ryan's sign.  No cords or calf tenderness.  No significant calf/ankle edema.     Imaging:  Xrays:  No signs of fracture, there is good alignment, and no signs of aseptic loosening.   Radiographic findings reviewed with patient    Assessment:     Encounter Diagnoses   Name Primary?    Post-op pain     S/P total knee arthroplasty, left Yes      Doing well postoperatively.     Plan:     Continues current post-op course, staples were removed at today's appointment  Patient is to continue taking enteric coated aspirin 81 mg, 1 tablet twice daily.    Patient is to continue wearing DB hose, on during the day and off at night.    Outpatient physical therapy is to begin immediately.    No bathing/swimming/hot tub for two weeks or until incision is completely closed.    We will see the patient back in 4 weeks for repeat xray and evaluation.  Please call office with any questions or concerns at [phone]

## 2024-05-02 ENCOUNTER — EVALUATION (OUTPATIENT)
Dept: PHYSICAL THERAPY | Age: 64
End: 2024-05-02
Payer: MEDICARE

## 2024-05-02 DIAGNOSIS — Z96.652 S/P TOTAL KNEE ARTHROPLASTY, LEFT: ICD-10-CM

## 2024-05-02 DIAGNOSIS — G89.18 POST-OP PAIN: Primary | ICD-10-CM

## 2024-05-02 PROCEDURE — 97163 PT EVAL HIGH COMPLEX 45 MIN: CPT | Performed by: PHYSICAL THERAPIST

## 2024-05-02 NOTE — PROGRESS NOTES
[facility] OUTPATIENT REHABILITATION  PHYSICAL THERAPY INITIAL EVALUATION         Date:  2024   Patient: Per Stroud  : 1960  MRN: [id number]  Referring Provider: Dhav[place] [address]     Medical Diagnosis:     G89.18 (ICD-10-CM) - Post-op pain  Z96.652 (ICD-10-CM) - S/P total knee arthroplasty, left    Physician Order: Eval and Treat     SUBJECTIVE:     Surgical procedure: L TKA    Date of surgery: 4/15/2024    Services provided following surgery: home care    History: TKA due to DJD.    Chief complaint: pain, difficulty walking, can't do what I want to do    Pain:   Current: 6/10       Symptom Type / Quality: aching  Location:: Knee: medial, anterior , mid thigh    Imaging results: XR KNEE LEFT (1-2 VIEWS)    Result Date: 2024  EXAMINATION: TWO XRAY VIEWS OF THE LEFT KNEE 2024 1:17 pm COMPARISON: 04/15/2024 HISTORY: ORDERING SYSTEM PROVIDED HISTORY: Primary osteoarthritis of left knee FINDINGS: Two-view left knee reveal patient to be status post replacement of the left knee.  Satisfactory alignment of the prosthesis.  No hardware complication. Moderate-sized joint effusion.  Surgical staples overlying the left knee.     Status post total left knee arthroplasty.  Satisfactory alignment of the prosthesis with no hardware complication.  Moderate-sized joint effusion.     XR KNEE LEFT (1-2 VIEWS)    Result Date: 2024  EXAMINATION: TWO XRAY VIEWS OF THE LEFT KNEE 4/15/2024 9:34 am COMPARISON: 2024. HISTORY: ORDERING SYSTEM PROVIDED HISTORY: postop TECHNOLOGIST PROVIDED HISTORY: Of operative side while in recovery room. Reason for exam:->postop FINDINGS: Patient is now status post left total knee arthroplasty with patellar resurfacing.  Orthopedic hardware appears intact.  No fractures or dislocations.  No suspicious focal bony lesions. Soft tissue swelling and soft tissue gas about the knee predominantly

## 2024-05-06 ENCOUNTER — TREATMENT (OUTPATIENT)
Dept: PHYSICAL THERAPY | Age: 64
End: 2024-05-06
Payer: MEDICARE

## 2024-05-06 DIAGNOSIS — M17.12 PRIMARY OSTEOARTHRITIS OF LEFT KNEE: ICD-10-CM

## 2024-05-06 DIAGNOSIS — G89.18 POST-OP PAIN: Primary | ICD-10-CM

## 2024-05-06 PROBLEM — Z96.652 S/P TOTAL KNEE ARTHROPLASTY, LEFT: Status: ACTIVE | Noted: 2024-05-06

## 2024-05-06 PROCEDURE — 97112 NEUROMUSCULAR REEDUCATION: CPT

## 2024-05-06 RX ORDER — CEPHALEXIN 500 MG/1
500 CAPSULE ORAL 3 TIMES DAILY
Qty: 21 CAPSULE | Refills: 0 | Status: SHIPPED | OUTPATIENT
Start: 2024-05-06 | End: 2024-05-13

## 2024-05-06 NOTE — PROGRESS NOTES
Physical Therapy Daily Treatment Note    Date: 2024  Patient Name: Per Stroud  : 1960   MRN: [id number]  DOInjury: -  DOSx: 4/15/2024  Referring Provider: [place] [address]     Medical Diagnosis:     G89.18 (ICD-10-CM) - Post-op pain  Z96.652 (ICD-10-CM) - S/P total knee arthroplasty, left    Outcome Measure:  LEFS  65%    X = TO BE PERFORMED NEXT VISIT  > = PROGRESS TO THIS    S: See eval  O: Discussed anatomy, physiology, body mechanics, principles of loading, and progressive loading/activity.  Reviewed home exercise program extensively along with instructions for ice and elevation; written copy provided.   Time  8787-8930       Visit  1 Repeat outcome measure at mid point and end.    Pain    Pain with activity 6/10     ROM  115/-14     Modalities          MO   Manual      Stretching knee flexion   MT   Stretching       Patella mobs   TE   Prone hangs   TE   Heel props   TE   Knee flex stretch-seated   TE   Prone self flexion stretch   TE   Exercise       Nustep  x  TE   QSAQ x  TE   Heel slides x  TE   SLR x  TE   LAQ x  TE   Marching x  TA   Squat    TA   Step-ups - FWD    TA   Step-ups - LAT   TA   Step-ups - BWD    TA   Step up and over reciprocally    TA   [x] TG  [] Leg Press 2-leg x  TE   [] TG  [] Leg Press 1-leg   TE   CR    TE   Knee Extension Machine   TE   Marching gait >  NR   Side stepping >  NR               A: Tolerated well.    P: Continue with rehab plan  Heriberto Alexander PT    Treatment Charges: Mins Units   Initial Evaluation 45 1   Re-Evaluation     Ther Exercise         TE     Manual Therapy     MT     Ther Activities        TA     Gait Training          GT     Neuro Re-education NR     Modalities     Non-Billable Service Time     Other     Total Time/Units 45 1

## 2024-05-06 NOTE — PROGRESS NOTES
Physical Therapy Daily Treatment Note    Date: 2024  Patient Name: Per Stroud  : 1960   MRN: [id number]  DOInjury: -  DOSx: 4/15/2024  Referring Provider: [place] [address]     Medical Diagnosis:     G89.18 (ICD-10-CM) - Post-op pain  Z96.652 (ICD-10-CM) - S/P total knee arthroplasty, left    Outcome Measure:  LEFS  65%    X = TO BE PERFORMED NEXT VISIT  > = PROGRESS TO THIS    S: pt reports very sore and swelled today.    O: Discussed anatomy, physiology, body mechanics, principles of loading, and progressive loading/activity.  Reviewed home exercise program extensively along with instructions for ice and elevation; written copy provided.   Time  3340-7693      Visit  2 Repeat outcome measure at mid point and end.    Pain    Pain with activity 6/10     ROM  118/-14     Modalities          MO   Manual      Stretching knee flexion   MT   Stretching       Patella mobs   TE   Prone hangs   TE   Heel props 2.3 min  TE   Knee flex stretch-seated   TE   Prone self flexion stretch   TE   Exercise       Nustep  L5/  10 min  TE   QS x  TE   Heel slides     sitting 2 x 20  TE   SLR hold  TE   LAQ 2 x 15  TE   Marching 25x  TA   CR 25x     Squat    TA   Step-ups - FWD    TA   Step-ups - LAT   TA   Step-ups - BWD    TA   Step up and over reciprocally    TA   [x] TG  [] Leg Press 2-leg L14 2 x 20  TE   [] TG  [] Leg Press 1-leg   TE   CR    TE   Knee Extension Machine   TE   Marching gait >  NR   Side stepping >  NR               A: Tolerated well.    P: Continue with rehab plan  Viktoria Bass PTA    Treatment Charges: Mins Units   Initial Evaluation     Re-Evaluation     Ther Exercise         TE     Manual Therapy     MT     Ther Activities        TA     Gait Training          GT     Neuro Re-education NR 30 2   Modalities     Non-Billable Service Time 15 0   Other     Total Time/Units 45 2

## 2024-05-08 ENCOUNTER — TREATMENT (OUTPATIENT)
Dept: PHYSICAL THERAPY | Age: 64
End: 2024-05-08
Payer: MEDICARE

## 2024-05-08 DIAGNOSIS — G89.18 POST-OP PAIN: Primary | ICD-10-CM

## 2024-05-08 DIAGNOSIS — Z96.652 S/P TOTAL KNEE ARTHROPLASTY, LEFT: ICD-10-CM

## 2024-05-08 PROCEDURE — 97016 VASOPNEUMATIC DEVICE THERAPY: CPT

## 2024-05-08 PROCEDURE — 97112 NEUROMUSCULAR REEDUCATION: CPT

## 2024-05-08 NOTE — PROGRESS NOTES
Physical Therapy Daily Treatment Note    Date: 2024  Patient Name: Per Stroud  : 1960   MRN: [id number]  DOInjury: -  DOSx: 4/15/2024  Referring Provider: [place] [address]     Medical Diagnosis:     G89.18 (ICD-10-CM) - Post-op pain  Z96.652 (ICD-10-CM) - S/P total knee arthroplasty, left    Outcome Measure:  LEFS  65%    X = TO BE PERFORMED NEXT VISIT  > = PROGRESS TO THIS    S: pt reports very sore and swelled today.    O: Discussed anatomy, physiology, body mechanics, principles of loading, and progressive loading/activity.  Reviewed home exercise program extensively along with instructions for ice and elevation; written copy provided.   Time  9407-5614      Visit  3 Repeat outcome measure at mid point and end.    Pain    Pain with activity 6/10     ROM  118/-14     Modalities       Comp/josette ice 15 min  MO   Manual      Stretching knee flexion   MT   Stretching       Patella mobs   TE   Prone hangs   TE   Heel props 8 min  TE   Knee flex stretch-seated   TE   Prone self flexion stretch   TE   Exercise       Nustep  L5/  10 min  TE   QS x  TE   Heel slides     sitting 2 x 20  TE   SLR hold  TE   LAQ 4# 2 x 15  TE   Marching 25x  TA   CR 25x     Squat    TA   Step-ups - FWD    TA   Step-ups - LAT   TA   Step-ups - BWD    TA   Step up and over reciprocally    TA   [x] TG  [] Leg Press 2-leg L14  2 x 20  TE   [] TG  [] Leg Press 1-leg   TE   CR    TE   Knee Extension Machine   TE   Marching gait >  NR   Side stepping >  NR               A: Tolerated well.  Due to increased swelling used compression/ice machince  P: Continue with rehab plan  Viktoria Bass PTA    Treatment Charges: Mins Units   Initial Evaluation     Re-Evaluation     Ther Exercise         TE     Manual Therapy     MT     Ther Activities        TA     Gait Training          GT     Neuro Re-education NR 30 2   Modalities 15 1   Non-Billable Service Time     Other     Total

## 2024-05-13 DIAGNOSIS — G89.18 POST-OP PAIN: ICD-10-CM

## 2024-05-13 RX ORDER — OXYCODONE HYDROCHLORIDE AND ACETAMINOPHEN 5; 325 MG/1; MG/1
1 TABLET ORAL EVERY 6 HOURS PRN
Qty: 28 TABLET | Refills: 0 | Status: SHIPPED | OUTPATIENT
Start: 2024-05-13 | End: 2024-05-20

## 2024-05-14 ENCOUNTER — TREATMENT (OUTPATIENT)
Dept: PHYSICAL THERAPY | Age: 64
End: 2024-05-14
Payer: MEDICARE

## 2024-05-14 DIAGNOSIS — G89.18 POST-OP PAIN: Primary | ICD-10-CM

## 2024-05-14 DIAGNOSIS — Z96.652 S/P TOTAL KNEE ARTHROPLASTY, LEFT: ICD-10-CM

## 2024-05-14 PROCEDURE — 97016 VASOPNEUMATIC DEVICE THERAPY: CPT

## 2024-05-14 PROCEDURE — 97112 NEUROMUSCULAR REEDUCATION: CPT

## 2024-05-14 NOTE — PROGRESS NOTES
Physical Therapy Daily Treatment Note    Date: 2024  Patient Name: Per Stroud  : 1960   MRN: [id number]  DOInjury: -  DOSx: 4/15/2024  Referring Provider: [place] [address]     Medical Diagnosis:     G89.18 (ICD-10-CM) - Post-op pain  Z96.652 (ICD-10-CM) - S/P total knee arthroplasty, left    Outcome Measure:  LEFS  65%    X = TO BE PERFORMED NEXT VISIT  > = PROGRESS TO THIS    S: pt reports very sore and swelled today. Pt. Mentioned that he did a lot of work and was sore yesterday.    O: Discussed anatomy, physiology, body mechanics, principles of loading, and progressive loading/activity.  Reviewed home exercise program extensively along with instructions for ice and elevation; written copy provided.   Time  2790-6924      Visit  4 Repeat outcome measure at mid point and end.    Pain    Pain with activity 6/10     ROM  124/-2     Modalities       Comp/josette ice 15 min  MO   Manual      Stretching knee flexion   MT   Stretching       Patella mobs 20-30x  TE   Prone hangs   TE   Heel props 2#  6 min  TE   Knee flex stretch-seated   TE   Prone self flexion stretch   TE   Exercise       Nustep  L5/  10 min  TE   QS x  TE   Heel slides     sitting 2 x 20  TE   SLR hold  TE   LAQ 5# 2 x 15  TE   Marching 25x  TA   CR 25x     Squat    TA   Step-ups - FWD  6# 10x  TA   Step-ups - LAT   TA   Step-ups - BWD    TA   Step up and over reciprocally    TA   [x] TG  [] Leg Press 2-leg L14  2 x 20  TE   [] TG  [] Leg Press 1-leg   TE   CR    TE   Knee Extension Machine   TE   Marching gait >  NR   Side stepping >  NR               A: Tolerated well.  Due to increased swelling used compression/ice machince  P: Continue with rehab plan  Viktoria Bass PTA    Treatment Charges: Mins Units   Initial Evaluation     Re-Evaluation     Ther Exercise         TE     Manual Therapy     MT     Ther Activities        TA     Gait Training          GT     Neuro Re-education NR

## 2024-05-17 ENCOUNTER — TREATMENT (OUTPATIENT)
Dept: PHYSICAL THERAPY | Age: 64
End: 2024-05-17

## 2024-05-17 DIAGNOSIS — G89.18 POST-OP PAIN: Primary | ICD-10-CM

## 2024-05-17 DIAGNOSIS — Z96.652 S/P TOTAL KNEE ARTHROPLASTY, LEFT: ICD-10-CM

## 2024-05-17 NOTE — PROGRESS NOTES
Physical Therapy Daily Treatment Note    Date: 2024  Patient Name: Per Stroud  : 1960   MRN: [id number]  DOInjury: -  DOSx: 4/15/2024  Referring Provider: [place] [address]     Medical Diagnosis:     G89.18 (ICD-10-CM) - Post-op pain  Z96.652 (ICD-10-CM) - S/P total knee arthroplasty, left    Outcome Measure:  LEFS  65%    X = TO BE PERFORMED NEXT VISIT  > = PROGRESS TO THIS    S: pt reports 5/10 pain before treatment. Pt. Reports varying tightness in the groin.   O: entered in clinic  with SC  Discussed anatomy, physiology, body mechanics, principles of loading, and progressive loading/activity.  Reviewed home exercise program extensively along with instructions for ice and elevation; written copy provided.   Time  9715-0390      Visit  5 approved 10 visits  [id number], [id number], 68438, 35206  2024 through 2024     Pain    Pain 5/10 at rest     ROM  124/-2     Modalities       Comp/josette ice 15 min  MO   Manual      Stretching knee flexion   MT   Stretching       Patella mobs  TE   Prone hangs   TE   Heel props  TE   Knee flex stretch-seated   TE   Prone self flexion stretch   TE   Exercise       Nustep  L6/ 10 min  TE   QS x  TE   Heel slides     sitting  TE   SLR  TE   LAQ 5# 2 x 15 Machine next? TE   Marching 30 x  TA   CR 30 x     Squat    TA   Step-ups - FWD  6 inch 20 x  8 inch next TA   Step-ups - LAT 4 inch 10 x   TA   Step-ups - BWD    TA   Step up and over reciprocally  Next?  TA   [x] TG  [] Leg Press 2-leg L14  2 x 20 Leg press next TE   [] TG  [] Leg Press 1-leg   TE   CR    TE   Knee Extension Machine Next  TE   Marching gait >  NR   Side stepping >  NR               A: Tolerated well.  Due to increased swelling used compression/ice machine. Pt. Demonstrated muscular improvement due to ability to perform lateral stepping exercises toward the left.   P: Continue with rehab plan  Viktoria Bass PTA and Rolando Baez SPTA     Treatment

## 2024-05-21 ENCOUNTER — TREATMENT (OUTPATIENT)
Dept: PHYSICAL THERAPY | Age: 64
End: 2024-05-21
Payer: MEDICARE

## 2024-05-21 DIAGNOSIS — G89.18 POST-OP PAIN: Primary | ICD-10-CM

## 2024-05-21 DIAGNOSIS — Z96.652 S/P TOTAL KNEE ARTHROPLASTY, LEFT: ICD-10-CM

## 2024-05-21 PROCEDURE — 97016 VASOPNEUMATIC DEVICE THERAPY: CPT

## 2024-05-21 PROCEDURE — 97110 THERAPEUTIC EXERCISES: CPT

## 2024-05-21 PROCEDURE — 97112 NEUROMUSCULAR REEDUCATION: CPT

## 2024-05-21 NOTE — PROGRESS NOTES
Physical Therapy Daily Treatment Note    Date: 2024  Patient Name: Per Stroud  : 1960   MRN: [id number]  DOInjury: -  DOSx: 4/15/2024  Referring Provider: [place] [address]     Medical Diagnosis:     G89.18 (ICD-10-CM) - Post-op pain  Z96.652 (ICD-10-CM) - S/P total knee arthroplasty, left    Outcome Measure:  LEFS  65%    X = TO BE PERFORMED NEXT VISIT  > = PROGRESS TO THIS    S: pt reports fell down on   while doing yard picking up sticks due to slipping in the mud. Pain in the knee is the same as it was last treatment  O: entered in clinic  with SC and scratches on knee. Discussed anatomy, physiology, body mechanics, principles of loading, and progressive loading/activity.  Reviewed home exercise program extensively along with instructions for ice and elevation; written copy provided.   Time  7738-7974      Visit  6 approved 10 visits  [id number], 35632, [id number], [id number]  2024 through 2024     Pain    Pain 5/10 at rest     ROM  124/-2     Modalities       Comp/josette ice 15 min  MO   Manual      Stretching knee flexion   MT   Stretching       Patella mobs  TE   Prone hangs   TE   Heel props  TE   Knee flex stretch-seated   TE   Prone self flexion stretch   TE   Exercise       Nustep  L6/ 10 min  TE   QS x  TE   Heel slides     sitting  TE   SLR  TE   LAQ 5# 2 x 15 Machine next? TE   Marching 30 x  TA   CR 30 x     Squat    TA   Step-ups - FWD  8 inch 10 x and 6 inch 15 x   TA   Step-ups - LAT 8 inch 2 x10    TA   Step-ups - BWD    TA   Step up and over reciprocally  Next?  TA   [x] TG  [] Leg Press 2-leg L 17 3 x 20  Leg press next TE   [] TG  [] Leg Press 1-leg   TE   CR    TE   Knee Extension Machine Next  TE   Marching gait >  NR   Side stepping >  NR               A: Tolerated well.  Pt. Appears to have increased swelling compared to last treatment possibly due to the recent fall.  Pt. Demonstrated muscular improvement due to ability to

## 2024-05-23 ENCOUNTER — OFFICE VISIT (OUTPATIENT)
Dept: ORTHOPEDIC SURGERY | Age: 64
End: 2024-05-23

## 2024-05-23 ENCOUNTER — TREATMENT (OUTPATIENT)
Dept: PHYSICAL THERAPY | Age: 64
End: 2024-05-23

## 2024-05-23 VITALS — HEIGHT: 74 IN | BODY MASS INDEX: 31.57 KG/M2 | WEIGHT: 246 LBS

## 2024-05-23 DIAGNOSIS — Z96.652 S/P TOTAL KNEE ARTHROPLASTY, LEFT: Primary | ICD-10-CM

## 2024-05-23 DIAGNOSIS — Z96.652 S/P TOTAL KNEE ARTHROPLASTY, LEFT: ICD-10-CM

## 2024-05-23 DIAGNOSIS — G89.18 POST-OP PAIN: Primary | ICD-10-CM

## 2024-05-23 PROCEDURE — 99024 POSTOP FOLLOW-UP VISIT: CPT

## 2024-05-23 RX ORDER — OXYCODONE HYDROCHLORIDE AND ACETAMINOPHEN 5; 325 MG/1; MG/1
1 TABLET ORAL EVERY 6 HOURS PRN
Qty: 28 TABLET | Refills: 0 | Status: SHIPPED | OUTPATIENT
Start: 2024-05-23 | End: 2024-05-30

## 2024-05-23 NOTE — PROGRESS NOTES
Post-Operative week: 6 Status Post left Total Knee Arthroplasty, DOS: 4/15/24  Systemic or Specific Complaints:No Complaints. He is ambulating with cane. He stated he slipped and fell on the knee on Sunday. He denied increased pain or trouble walking. He has been attending PT without issues. He takes Percocet 5mg as needed for pain.     Objective:     General: alert, appears stated age and cooperative   Wound: Wound clean and dry no evidence of infection., No Erythema, No Edema and No Drainage. A few scabbed over scratches around the incision.   Motion: Flexion: 0 to 125 Degrees   DVT Exam: No evidence of DVT seen on physical exam.  Negative Ryan's sign.  No cords or calf tenderness.  No significant calf/ankle edema.       Xrays:  None today.       Assessment:     Encounter Diagnosis   Name Primary?    S/P total knee arthroplasty, left Yes      Doing well postoperatively.     Plan:     Continues current post-op course  Patient is to discontinue taking enteric coated aspirin 325mg.    Patient is to discontinue wearing DB hose.    Continue with outpatient physical therapy.    Follow up 2 months.   Percocet 5mg

## 2024-05-23 NOTE — PROGRESS NOTES
Physical Therapy Daily Treatment Note    Date: 2024  Patient Name: Per Stroud  : 1960   MRN: [id number]  DOInjury: -  DOSx: 4/15/2024  Referring Provider: [place] [address]     Medical Diagnosis:     G89.18 (ICD-10-CM) - Post-op pain  Z96.652 (ICD-10-CM) - S/P total knee arthroplasty, left    Outcome Measure:  LEFS  65%    X = TO BE PERFORMED NEXT VISIT  > = PROGRESS TO THIS    S: pt reports that knee has healed from the fall on . Pain is the same as last treatment in the left knee. pain is more intense on the thigh just proximal to the knee.   O: entered in clinic  with SC and scratches on knee. Discussed anatomy, physiology, body mechanics, principles of loading, and progressive loading/activity.  Reviewed home exercise program extensively along with instructions for ice and elevation; written copy provided.   Time  6664-8207      Visit  7 approved 10 visits  43283, 42690, 11569, 44997  2024 through 2024     Pain    Pain 5/10 at rest     ROM  124/-2     Modalities       Comp/josette ice  MO   Manual      Stretching knee flexion   MT   Stretching       Patella mobs  TE   Prone hangs   TE   Heel props  TE   Knee flex stretch-seated   TE   Prone self flexion stretch   TE   Exercise       Nustep  L6/ 10 min  TE   QS x  TE   Heel slides     sitting  TE   SLR  TE   LAQ 5# 2 x 20 Machine next? TE   Marching 30 x with 1# bilaterally   TA   CR 30 x with 1# bilaterally      Squat    TA   Step-ups - FWD  8 inch 25 x   TA   Step-ups - LAT 8 inch 25 x     TA   Step-ups - BWD  8 inch 2 x and 20 x on 6 inch   TA   Step up and over reciprocally  Next?  TA   [x] TG  [] Leg Press 2-leg Leg press next TE   [] TG  [] Leg Press 1-leg   TE   CR    TE   Knee Extension Machine Next  TE   Marching gait >  NR   Side stepping >  NR               A: Tolerated well.   Pt.  Appears to have less swelling compared to last treatment. The wound of the left knee

## 2024-05-29 ENCOUNTER — TREATMENT (OUTPATIENT)
Dept: PHYSICAL THERAPY | Age: 64
End: 2024-05-29
Payer: MEDICARE

## 2024-05-29 DIAGNOSIS — G89.18 POST-OP PAIN: Primary | ICD-10-CM

## 2024-05-29 DIAGNOSIS — Z96.652 S/P TOTAL KNEE ARTHROPLASTY, LEFT: ICD-10-CM

## 2024-05-29 PROCEDURE — 97530 THERAPEUTIC ACTIVITIES: CPT

## 2024-05-29 PROCEDURE — 97016 VASOPNEUMATIC DEVICE THERAPY: CPT

## 2024-05-29 NOTE — PROGRESS NOTES
were taken frequently during treatment due to muscular fatigue and pain. Knee range of motion appears to be limited mostly due to swelling.   P: Continue with plan of care.   Viktoria Bass PTA and Rolando Baez SPTA     Treatment Charges: Mins Units   Initial Evaluation     Re-Evaluation     Ther Exercise         TE     Manual Therapy     MT     Ther Activities        TA 30 2   Gait Training          GT     Neuro Re-education NR     Modalities 15 1   Non-Billable Service Time     Other     Total Time/Units 45 3

## 2024-06-04 ENCOUNTER — TREATMENT (OUTPATIENT)
Dept: PHYSICAL THERAPY | Age: 64
End: 2024-06-04
Payer: MEDICARE

## 2024-06-04 DIAGNOSIS — Z96.652 S/P TOTAL KNEE ARTHROPLASTY, LEFT: ICD-10-CM

## 2024-06-04 DIAGNOSIS — G89.18 POST-OP PAIN: Primary | ICD-10-CM

## 2024-06-04 PROCEDURE — 97110 THERAPEUTIC EXERCISES: CPT

## 2024-06-04 PROCEDURE — 97530 THERAPEUTIC ACTIVITIES: CPT

## 2024-06-04 PROCEDURE — 97016 VASOPNEUMATIC DEVICE THERAPY: CPT

## 2024-06-04 NOTE — PROGRESS NOTES
Physical Therapy Daily Treatment Note    Date: 2024  Patient Name: Per Stroud  : 1960   MRN: [id number]  DOInjury: -  DOSx: 4/15/2024  Referring Provider: Dhaval Molina DO   [place] [id number]     Medical Diagnosis:     G89.18 (ICD-10-CM) - Post-op pain  Z96.652 (ICD-10-CM) - S/P total knee arthroplasty, left    Outcome Measure:  LEFS  65%    X = TO BE PERFORMED NEXT VISIT  > = PROGRESS TO THIS    S: pt reports cutting grass 2 days ago took about an hour had no issues. Pain is the same as last treatment in the left knee. pain is more intense on the thigh just proximal to the knee.   O: entered in clinic  with SC and scratches on knee. Discussed anatomy, physiology, body mechanics, principles of loading, and progressive loading/activity.  Reviewed home exercise program extensively along with instructions for ice and elevation; written copy provided.   Time  7092-9731      Visit  9 approved 10 visits  78792, 95974, 67951, 02747  2024 through 2024     Pain    Pain 5/10 at rest     ROM  124/-2     Modalities       Comp/josette ice 15 min  MO   Manual      Stretching knee flexion   MT   Stretching       Patella mobs  TE   Prone hangs   TE   Heel props 2#  6 min  TE   Knee flex stretch-seated   TE   Prone self flexion stretch   TE   Exercise       Nustep  L6/ 10 min  TE   QS x  TE   Heel slides     sitting  TE   SLR  TE   LAQ TE   Marching 30 x with 1# bilaterally   TA   CR 30 x with 1# bilaterally      Squat    TA   Step-ups - FWD   1# 8 inch 20x   TA   Step-ups - LAT 1#  8 inch 20x     TA   Step-ups - BWD     TA   Step up and over reciprocally  Next?  TA   [] TG  [x] Leg Press 2-leg 45#  20 x   TE   [] TG  [x] Leg Press 1-leg 25# 5 x   TE   CR    TE   Knee Extension Machine 15# 25 x and 25# one time   TE   Marching gait >  NR   Side stepping >  NR               A: Tolerated well.   Pt.  Appears to have less swelling compared to last treatment. The wound of the left

## 2024-06-06 ENCOUNTER — TREATMENT (OUTPATIENT)
Dept: PHYSICAL THERAPY | Age: 64
End: 2024-06-06
Payer: MEDICARE

## 2024-06-06 DIAGNOSIS — Z96.652 S/P TOTAL KNEE ARTHROPLASTY, LEFT: ICD-10-CM

## 2024-06-06 DIAGNOSIS — G89.18 POST-OP PAIN: Primary | ICD-10-CM

## 2024-06-06 PROCEDURE — 97110 THERAPEUTIC EXERCISES: CPT

## 2024-06-06 PROCEDURE — 97530 THERAPEUTIC ACTIVITIES: CPT

## 2024-06-06 PROCEDURE — 97016 VASOPNEUMATIC DEVICE THERAPY: CPT

## 2024-06-06 NOTE — PROGRESS NOTES
Physical Therapy Daily Treatment Note    Date: 2024  Patient Name: Per Stroud  : 1960   MRN: [id number]  DOInjury: -  DOSx: 4/15/2024  Referring Provider: [place] [address]     Medical Diagnosis:     G89.18 (ICD-10-CM) - Post-op pain  Z96.652 (ICD-10-CM) - S/P total knee arthroplasty, left    Outcome Measure:  LEFS  65%    X = TO BE PERFORMED NEXT VISIT  > = PROGRESS TO THIS    S: pt reports cutting grass 2 days ago took about an hour had no issues. Pain is the same as last treatment in the left knee. pain is more intense on the thigh just proximal to the knee.   O: entered in clinic  with SC and scratches on knee. Discussed anatomy, physiology, body mechanics, principles of loading, and progressive loading/activity.  Reviewed home exercise program extensively along with instructions for ice and elevation; written copy provided.   Time  0218-7289      Visit  10 approved 10 visits  03523, 16091, 66700, 93505  2024 through 2024     Pain    Pain 5/10 at rest     ROM  124/-2     Modalities       Comp/josette ice 15 min  MO   Manual      Stretching knee flexion   MT   Stretching       Patella mobs  TE   Prone hangs   TE   Heel props  TE   Knee flex stretch-seated   TE   Prone self flexion stretch   TE   Exercise       Nustep  L6/ 10 min  TE   QS x  TE   Heel slides     sitting  TE   SLR  TE   LAQ TE   Marching  TA   CR     Squat    TA   Step-ups - FWD   1# 8 inch 25x   TA   Step-ups - LAT 1#  8 inch 25x     TA   Step-ups - BWD     TA   Step up and over reciprocally    TA   [] TG  [x] Leg Press 2-leg 45#  20 x   TE   [] TG  [x] Leg Press 1-leg 25# 6 x   TE   CR    TE   Knee Extension Machine 15# 25 x and 25# 10 x   TE   Marching gait >  NR   Side stepping >  NR               A: Tolerated well.   Pt.  Appears to have less swelling compared to last treatment. The wound of the left knee appears to be healed. . Rest breaks were taken frequently during

## 2024-07-08 LAB
ALBUMIN SERPL-MCNC: 4.5 G/DL (ref 3.5–5.2)
ALP SERPL-CCNC: 43 U/L (ref 40–129)
ALT SERPL-CCNC: 13 U/L (ref 0–40)
ANION GAP SERPL CALCULATED.3IONS-SCNC: 16 MMOL/L (ref 7–16)
AST SERPL-CCNC: 21 U/L (ref 0–39)
BILIRUB SERPL-MCNC: 0.2 MG/DL (ref 0–1.2)
BUN SERPL-MCNC: 26 MG/DL (ref 6–23)
CALCIUM SERPL-MCNC: 10 MG/DL (ref 8.6–10.2)
CHLORIDE SERPL-SCNC: 106 MMOL/L (ref 98–107)
CHOLEST SERPL-MCNC: 151 MG/DL
CO2 SERPL-SCNC: 20 MMOL/L (ref 22–29)
CREAT SERPL-MCNC: 0.9 MG/DL (ref 0.7–1.2)
ERYTHROCYTE [DISTWIDTH] IN BLOOD BY AUTOMATED COUNT: 12.9 % (ref 11.5–15)
GFR, ESTIMATED: >90 ML/MIN/1.73M2
GLUCOSE SERPL-MCNC: 101 MG/DL (ref 74–99)
HBA1C MFR BLD: 5.8 % (ref 4–5.6)
HCT VFR BLD AUTO: 43.7 % (ref 37–54)
HDLC SERPL-MCNC: 43 MG/DL
HGB BLD-MCNC: 14.7 G/DL (ref 12.5–16.5)
LDLC SERPL CALC-MCNC: 87 MG/DL
MCH RBC QN AUTO: 32.8 PG (ref 26–35)
MCHC RBC AUTO-ENTMCNC: 33.6 G/DL (ref 32–34.5)
MCV RBC AUTO: 97.5 FL (ref 80–99.9)
PLATELET # BLD AUTO: 398 K/UL (ref 130–450)
PMV BLD AUTO: 9.8 FL (ref 7–12)
POTASSIUM SERPL-SCNC: 4.9 MMOL/L (ref 3.5–5)
PROT SERPL-MCNC: 7.2 G/DL (ref 6.4–8.3)
PSA SERPL-MCNC: 0.94 NG/ML (ref 0–4)
RBC # BLD AUTO: 4.48 M/UL (ref 3.8–5.8)
SODIUM SERPL-SCNC: 142 MMOL/L (ref 132–146)
TRIGL SERPL-MCNC: 105 MG/DL
TSH SERPL DL<=0.05 MIU/L-ACNC: 1.85 UIU/ML (ref 0.27–4.2)
VLDLC SERPL CALC-MCNC: 21 MG/DL
WBC OTHER # BLD: 7.3 K/UL (ref 4.5–11.5)

## 2024-07-17 DIAGNOSIS — Z96.652 S/P TOTAL KNEE ARTHROPLASTY, LEFT: Primary | ICD-10-CM

## 2024-07-23 ENCOUNTER — OFFICE VISIT (OUTPATIENT)
Dept: ORTHOPEDIC SURGERY | Age: 64
End: 2024-07-23
Payer: MEDICARE

## 2024-07-23 VITALS — TEMPERATURE: 98 F | WEIGHT: 246 LBS | HEIGHT: 74 IN | BODY MASS INDEX: 31.57 KG/M2

## 2024-07-23 DIAGNOSIS — Z96.652 S/P TOTAL KNEE ARTHROPLASTY, LEFT: Primary | ICD-10-CM

## 2024-07-23 PROCEDURE — G8427 DOCREV CUR MEDS BY ELIG CLIN: HCPCS | Performed by: ORTHOPAEDIC SURGERY

## 2024-07-23 PROCEDURE — 3017F COLORECTAL CA SCREEN DOC REV: CPT | Performed by: ORTHOPAEDIC SURGERY

## 2024-07-23 PROCEDURE — 1036F TOBACCO NON-USER: CPT | Performed by: ORTHOPAEDIC SURGERY

## 2024-07-23 PROCEDURE — 99213 OFFICE O/P EST LOW 20 MIN: CPT | Performed by: ORTHOPAEDIC SURGERY

## 2024-07-23 PROCEDURE — G8417 CALC BMI ABV UP PARAM F/U: HCPCS | Performed by: ORTHOPAEDIC SURGERY

## 2024-07-23 PROCEDURE — 1123F ACP DISCUSS/DSCN MKR DOCD: CPT | Performed by: ORTHOPAEDIC SURGERY

## 2024-07-23 RX ORDER — METHYLPREDNISOLONE 4 MG/1
TABLET ORAL
Qty: 1 KIT | Refills: 0 | Status: SHIPPED | OUTPATIENT
Start: 2024-07-23 | End: 2024-07-29

## 2024-07-23 NOTE — PROGRESS NOTES
Encounter Diagnosis   Name Primary?    S/P total knee arthroplasty, left Yes       Plan:    Follow up 3 months  Medrol dose pack

## 2024-10-22 ENCOUNTER — OFFICE VISIT (OUTPATIENT)
Dept: ORTHOPEDIC SURGERY | Age: 64
End: 2024-10-22
Payer: MEDICARE

## 2024-10-22 VITALS — HEIGHT: 74 IN | TEMPERATURE: 98.6 F | WEIGHT: 246 LBS | BODY MASS INDEX: 31.57 KG/M2

## 2024-10-22 DIAGNOSIS — Z96.652 S/P TOTAL KNEE ARTHROPLASTY, LEFT: Primary | ICD-10-CM

## 2024-10-22 DIAGNOSIS — M51.369 DEGENERATION OF INTERVERTEBRAL DISC OF LUMBAR REGION, UNSPECIFIED WHETHER PAIN PRESENT: ICD-10-CM

## 2024-10-22 PROCEDURE — 99213 OFFICE O/P EST LOW 20 MIN: CPT | Performed by: ORTHOPAEDIC SURGERY

## 2024-10-22 PROCEDURE — 1123F ACP DISCUSS/DSCN MKR DOCD: CPT | Performed by: ORTHOPAEDIC SURGERY

## 2024-10-22 PROCEDURE — G8427 DOCREV CUR MEDS BY ELIG CLIN: HCPCS | Performed by: ORTHOPAEDIC SURGERY

## 2024-10-22 PROCEDURE — G8484 FLU IMMUNIZE NO ADMIN: HCPCS | Performed by: ORTHOPAEDIC SURGERY

## 2024-10-22 PROCEDURE — 1036F TOBACCO NON-USER: CPT | Performed by: ORTHOPAEDIC SURGERY

## 2024-10-22 PROCEDURE — G8417 CALC BMI ABV UP PARAM F/U: HCPCS | Performed by: ORTHOPAEDIC SURGERY

## 2024-10-22 PROCEDURE — 3017F COLORECTAL CA SCREEN DOC REV: CPT | Performed by: ORTHOPAEDIC SURGERY

## 2024-10-22 RX ORDER — OXYCODONE AND ACETAMINOPHEN 5; 325 MG/1; MG/1
1 TABLET ORAL EVERY 6 HOURS PRN
Qty: 28 TABLET | Refills: 0 | Status: SHIPPED | OUTPATIENT
Start: 2024-10-22 | End: 2024-10-29

## 2024-10-22 NOTE — PROGRESS NOTES
Chief Complaint   Patient presents with    Follow-up     Left TKA f/u. Patient states he is feeling good. Pain today 4/10.        Per Stroud returns today for follow-up of his left TKA. DOS: 4/15/24. He reports this is better than when I saw the patient last.  He stated he is having quad pain and c/o lower leg swelling. He has trouble going down stairs. He is ambulating without aid.     Past Medical History:   Diagnosis Date    Acid reflux     GERD (gastroesophageal reflux disease)     Hyperlipidemia     Hypertension     Hypokalemia     Hypomagnesemia     Low back pain      Past Surgical History:   Procedure Laterality Date    ANESTHESIA NERVE BLOCK Right 01/14/2020    RIGHT L2,3,4 MEDIAL BRANCH BLOCK WITHOUT SEDATION (CPT 37836,93467) performed by Sean Cervantes MD at [facility] OR    ANESTHESIA NERVE BLOCK Right 02/20/2020    RIGHT L2,3,4 MEDIAL BRANCH BLOCK (CPT 00443,45607) performed by Sean Cervantes MD at [facility] OR    ANESTHESIA NERVE BLOCK Left 06/29/2020    LEFT L2 L3 L4 MEDIAL BRANCH BLOCK (CPT 38549) (WANTS AFTERNOON APPOINTMENT) performed by Sean Cervantes MD at [facility] OR    BACK SURGERY      May 4, 2018 decompression with Dr. Daily.     CARPAL TUNNEL RELEASE Left 01/24/2014    CARPAL TUNNEL RELEASE      left    CERVICAL FUSION  11/01/2021    COLONOSCOPY      FRACTURE SURGERY      arm    FRACTURE SURGERY      left arm    HERNIA REPAIR      HERNIA REPAIR      x2 child    KNEE ARTHROSCOPY Left 02/20/2015    NERVE BLOCK Right 01/14/2020    NERVE BLOCK Right 02/20/2020    L2,3,4 medial     NERVE BLOCK Right 05/04/2020    L2,3,4,5 medial radiofrequency     NERVE BLOCK Left 06/29/2020    medial branch block    PAIN MANAGEMENT PROCEDURE Right 05/04/2020    RIGHT L2, 3, 4 MEDIAL BRANCH L5 DORSALRAMUS RADIOFREQUENCY ABLATION performed by Sean Cervantes MD at [facility] OR    SHOULDER ARTHROSCOPY Left 4/14/2023    LEFT SHOULDER ARTHROSCOPY, TENDOESIS, SUBACROMIAL DECOMPRESSION AND DEBRIDEMENT

## 2024-10-23 ENCOUNTER — TELEPHONE (OUTPATIENT)
Dept: ORTHOPEDIC SURGERY | Age: 64
End: 2024-10-23

## 2024-10-23 DIAGNOSIS — Z96.652 S/P TOTAL KNEE ARTHROPLASTY, LEFT: Primary | ICD-10-CM

## 2024-10-23 RX ORDER — CEPHALEXIN 500 MG/1
2000 CAPSULE ORAL DAILY PRN
Qty: 4 CAPSULE | Refills: 3 | Status: SHIPPED | OUTPATIENT
Start: 2024-10-23

## 2024-10-25 ENCOUNTER — OFFICE VISIT (OUTPATIENT)
Age: 64
End: 2024-10-25

## 2024-10-25 VITALS
DIASTOLIC BLOOD PRESSURE: 85 MMHG | HEART RATE: 88 BPM | HEIGHT: 74 IN | WEIGHT: 247 LBS | BODY MASS INDEX: 31.7 KG/M2 | SYSTOLIC BLOOD PRESSURE: 135 MMHG

## 2024-10-25 DIAGNOSIS — M48.061 SPINAL STENOSIS OF LUMBAR REGION WITH RADICULOPATHY: ICD-10-CM

## 2024-10-25 DIAGNOSIS — G56.23 ULNAR NEUROPATHY OF BOTH UPPER EXTREMITIES: ICD-10-CM

## 2024-10-25 DIAGNOSIS — M54.16 SPINAL STENOSIS OF LUMBAR REGION WITH RADICULOPATHY: ICD-10-CM

## 2024-10-25 DIAGNOSIS — G95.9 CERVICAL MYELOPATHY (HCC): Primary | ICD-10-CM

## 2024-10-25 RX ORDER — GABAPENTIN 300 MG/1
300 CAPSULE ORAL 3 TIMES DAILY
Qty: 90 CAPSULE | Refills: 3 | Status: SHIPPED | OUTPATIENT
Start: 2024-10-25 | End: 2025-02-22

## 2024-10-25 RX ORDER — BACLOFEN 10 MG/1
5 TABLET ORAL 3 TIMES DAILY
Qty: 45 TABLET | Refills: 3 | Status: SHIPPED | OUTPATIENT
Start: 2024-10-25

## 2024-10-25 NOTE — PROGRESS NOTES
L4-5, there are postoperative changes. There is a right foraminal   disc osteophyte complex and/or epidural fibrosis abutting the right   exiting L4 nerve. Correlation with contrast-enhanced study may be helpful   as clinically warranted.       Em2023:  Electrodiagnosis: There is electrodiagnostic evidence of a cervical radiculopathy.    Location: left C6.    Nature: [ X ] Axonal [ ] Demyelinating [ ] Mixed axonal and demyelinating     [ ] Sensory [ X ] Motor [ ] Mixed sensorimotor   [ X ] with active denervation [ ] without active denervation    Duration: Chronic    Severity: moderate    Prognosis: Poor     Electrodiagnosis: There is electrodiagnostic evidence of an ulnar neuropathy.    Location: left at the elbow.    Nature: [ ] Axonal [ ] Demyelinating [ X ] Mixed axonal and demyelinating     [ ] Sensory [ ] Motor [X ] Mixed sensorimotor   [ X ] with active denervation [ ] without active denervation    Duration: Subacute    Severity: severe    Prognosis: Fair. The prognosis for recovery of demyelinating lesions is good if the cause is alleviated. The prognosis for recovery of axonal lesions is poor and dependant on collateral sprouting and reinnervation.     Previous Study: There is a prior study for comparison. Date: . Provider: Dr. Alcantar. Compared to prior study, today's examination shows no further evidence of C8 radiculopathy. Continue to see ulnar neuropathy at the elbow with some progression of sensory abnormality including the loss of the left dorsal ulnar cutaneous response which was previously present. The right C6 radiculopathy which now appears more chronic in nature but is of similar severity.           /  Tyler subacute ulnar neurtopathy  Right c5, left c8 radiculopathy- active on chronic         Now:  Weakness: - only in arms.   Left > right  Right handed.   Left arm- limited lifting, lifting shoulder, dexterity affected  Right arm- lifting is ok, dexterity is poor in right hand.

## 2024-11-19 ENCOUNTER — OFFICE VISIT (OUTPATIENT)
Dept: PAIN MANAGEMENT | Age: 64
End: 2024-11-19
Payer: MEDICARE

## 2024-11-19 VITALS
OXYGEN SATURATION: 93 % | SYSTOLIC BLOOD PRESSURE: 116 MMHG | TEMPERATURE: 97 F | BODY MASS INDEX: 31.7 KG/M2 | RESPIRATION RATE: 18 BRPM | DIASTOLIC BLOOD PRESSURE: 72 MMHG | HEIGHT: 74 IN | WEIGHT: 247 LBS | HEART RATE: 93 BPM

## 2024-11-19 DIAGNOSIS — M47.816 LUMBAR FACET ARTHROPATHY: Primary | ICD-10-CM

## 2024-11-19 DIAGNOSIS — M54.16 LUMBAR RADICULOPATHY: ICD-10-CM

## 2024-11-19 DIAGNOSIS — M96.1 LUMBAR POSTLAMINECTOMY SYNDROME: ICD-10-CM

## 2024-11-19 DIAGNOSIS — M47.816 LUMBAR SPONDYLOSIS: ICD-10-CM

## 2024-11-19 DIAGNOSIS — M53.3 DISORDER OF SACRUM: ICD-10-CM

## 2024-11-19 DIAGNOSIS — M51.9 LUMBAR DISC DISORDER: ICD-10-CM

## 2024-11-19 PROCEDURE — 1036F TOBACCO NON-USER: CPT | Performed by: PAIN MEDICINE

## 2024-11-19 PROCEDURE — 99204 OFFICE O/P NEW MOD 45 MIN: CPT | Performed by: PAIN MEDICINE

## 2024-11-19 PROCEDURE — G8417 CALC BMI ABV UP PARAM F/U: HCPCS | Performed by: PAIN MEDICINE

## 2024-11-19 PROCEDURE — 3017F COLORECTAL CA SCREEN DOC REV: CPT | Performed by: PAIN MEDICINE

## 2024-11-19 PROCEDURE — G8427 DOCREV CUR MEDS BY ELIG CLIN: HCPCS | Performed by: PAIN MEDICINE

## 2024-11-19 PROCEDURE — G8484 FLU IMMUNIZE NO ADMIN: HCPCS | Performed by: PAIN MEDICINE

## 2024-11-19 PROCEDURE — 3078F DIAST BP <80 MM HG: CPT | Performed by: PAIN MEDICINE

## 2024-11-19 PROCEDURE — 1123F ACP DISCUSS/DSCN MKR DOCD: CPT | Performed by: PAIN MEDICINE

## 2024-11-19 PROCEDURE — 3074F SYST BP LT 130 MM HG: CPT | Performed by: PAIN MEDICINE

## 2024-11-19 RX ORDER — SODIUM CHLORIDE 0.9 % (FLUSH) 0.9 %
5-40 SYRINGE (ML) INJECTION EVERY 12 HOURS SCHEDULED
OUTPATIENT
Start: 2024-11-19

## 2024-11-19 RX ORDER — TERBINAFINE HYDROCHLORIDE 250 MG/1
250 TABLET ORAL DAILY
COMMUNITY
Start: 2024-10-18

## 2024-11-19 RX ORDER — CYCLOBENZAPRINE HCL 5 MG
5 TABLET ORAL 2 TIMES DAILY PRN
Qty: 30 TABLET | Refills: 0 | Status: SHIPPED | OUTPATIENT
Start: 2024-11-19 | End: 2024-11-29

## 2024-11-19 RX ORDER — SODIUM CHLORIDE 9 MG/ML
INJECTION, SOLUTION INTRAVENOUS PRN
OUTPATIENT
Start: 2024-11-19

## 2024-11-19 RX ORDER — SODIUM CHLORIDE 0.9 % (FLUSH) 0.9 %
5-40 SYRINGE (ML) INJECTION PRN
OUTPATIENT
Start: 2024-11-19

## 2024-11-19 NOTE — PROGRESS NOTES
Per Stroud presents to the [facility] on 11/19/2024. Per is complaining of pain lower back, radiates to right hip and RLE. The pain is constant. The pain is described as aching, stabbing, dull, sharp, and penetrating. Pain is rated on his best day at a 7, on his worst day at a 9, and on average at a 7 on the VAS scale. He took his last dose of Neurontin and 800 mg Ibuprofen, Baclofen  today.      Any procedures since your last visit: No  He is  on NSAIDS and  is not on anticoagulation medications  Pacemaker or defibrillator: No   Do you want someone present when the provider examines you? No    Medication Contract and Consent for Opioid Use Documents Filed        No documents found                       Resp 18   Ht 1.88 m (6' 2\")   Wt 112 kg (247 lb)   BMI 31.71 kg/m²      No LMP for male patient.

## 2024-11-19 NOTE — PROGRESS NOTES
0           [facility]         [address], [place] [address]      [phone]          Consult Note      Patient:  Per Stroud,  1960    Date of Service:  24    Requesting Physician:  Dhaval Molina DO    Reason for Consult:      Patient presents with complaints of low back pain that started a long time ago and has been progressively getting worse.  Pain is described as stabbing/aching/constant.  Pain is aggravated by sitting/standing/walking. Pain is relieved by ice/heat.    HISTORY OF PRESENT ILLNESS:      Pain does radiate to right lower extremity down to the leg. He  has numbness of the right lower extremity and does not have bladder or bowel dysfunction.    He has not been on anticoagulation medications to include none. The patient  has not been on herbal supplements.  The patient is not diabetic.    Imaging:   Lumbar spine MRI   T12-L1: The previously described cystic space occupying lesion in the   left lateral epidural space has essentially resolved. There is persistent   mild right and mild to moderate left facet hypertrophy. Previously noted   canal stenosis as essentially resolved. There is a persistent disc bulge   extending into the inferior neural foramina. There is stable mild to   moderate left-sided facet hypertrophy. There is persistent moderate right   and mild to moderate left foraminal stenosis.     L1-2: There is mild facet hypertrophy.  No significant central canal or   foraminal stenosis.  Findings are stable.     L2-3: There is a left paracentral and foraminal disc osteophyte complex.    It abuts both the left exiting L2 nerve and traversing L3 nerve root.    There is moderate facet and ligamentum flavum hypertrophy.  There is   moderate central canal stenosis.  There is moderate to severe right   foraminal stenosis. Findings are stable.     L3-4: There is a laminectomy defect. There is a left foraminal disc

## 2024-12-10 ENCOUNTER — TELEPHONE (OUTPATIENT)
Dept: PAIN MANAGEMENT | Age: 64
End: 2024-12-10

## 2024-12-10 NOTE — TELEPHONE ENCOUNTER
Per called stating that you had discussed switching him to Lyrica. Dr. Romero is currently prescribing his Gabapentin. Pt states Gabapentin has been ineffective. He would like to know if you will prescribe the Lyrica. Please advise.

## 2024-12-10 NOTE — TELEPHONE ENCOUNTER
Call to Per Stroud that procedure was scheduled for 12/19/2024 and that [facility] should call him a few days before for the pre op call and after 3:00 PM the business day before with the arrival time. Instructed Per to hold ibuprofen for 24 hours, Celebrex, Mobic, and naprosyn for 4 days and any aspirin containing products, CoQ 10, or fish oil for 7 days. Instructed to call office back if any questions. Per verbalized understanding.    Electronically signed by Michaela Bryson RN on 12/10/2024 at 11:20 AM

## 2024-12-11 DIAGNOSIS — M54.16 LUMBAR RADICULOPATHY: Primary | ICD-10-CM

## 2024-12-11 RX ORDER — CYCLOBENZAPRINE HCL 10 MG
10 TABLET ORAL 3 TIMES DAILY PRN
COMMUNITY

## 2024-12-11 RX ORDER — PREGABALIN 100 MG/1
100 CAPSULE ORAL 2 TIMES DAILY
Qty: 60 CAPSULE | Refills: 0 | Status: SHIPPED | OUTPATIENT
Start: 2024-12-11 | End: 2025-01-10

## 2024-12-11 NOTE — TELEPHONE ENCOUNTER
Pt notified. Given instructions on stopping Gabapentin and starting Lyrica. Pt verbalized understanding

## 2024-12-17 ENCOUNTER — HOSPITAL ENCOUNTER (OUTPATIENT)
Dept: NEUROLOGY | Age: 64
Discharge: HOME | End: 2024-12-17

## 2024-12-17 VITALS — WEIGHT: 240 LBS | HEIGHT: 74 IN | BODY MASS INDEX: 30.8 KG/M2

## 2024-12-17 NOTE — PROCEDURES
PROCEDURE NOTE  Date: 12/17/2024   Name: Per Stroud  YOB: 1960    Procedures:      [facility]  Electrodiagnostic Laboratory   Jenn         Full Name: ePr Stroud Gender: Male  MRN: [id number] YOB: 1960  Location:: [facility]      Visit Date: 12/17/2024 11:35  Age: 64 Years 10 Months Old  Examining Physician: Dr. MIKE Romero   Referring Physician: Dr. Romero   Technician: Charo Álvarez   Height: 6 feet 2 inch  Weight: 240 lbs  Notes: DM:No  BMI:  Blood thinner:NO  Pacemaker/defib-No  BUE/RLE: Ulnar neuroapthy/cevical radic/sciatica       Motor NCS      Nerve / Sites Lat. Amplitude Distance Velocity Temp.    ms mV cm m/s °C   R Median - APB      Wrist 4.38 8.9 8  32      Elbow 9.32 8.0 23 46 32   L Median - APB      Wrist 3.49 7.3 8  32      Elbow 7.86 6.9 24 55 32   R Ulnar - ADM      Wrist 3.80 3.3 8  32      B.Elbow 7.24 2.8 22 64 32      A.Elbow 11.09 2.0 10 26 32   L Ulnar - ADM      Wrist 3.59 7.1 8  32      B.Elbow 7.40 5.2 22 58 32      A.Elbow 10.47 4.0 10 33 32   R Peroneal - EDB      Ankle 4.32 2.8 8  31      Fib head 11.51 2.5 31 43 31      Pop fossa 13.91 2.5 10 42 31   R Tibial - AH      Ankle 3.96 5.6 8  31      Pop fossa 13.28 5.3 39 42 31       Sensory NCS      Nerve / Sites Onset Lat Peak Lat PP Amp Distance Peak Diff Velocity Temp.    ms ms µV cm ms m/s °C   R Median - Digit II (Antidromic)      Mid Palm 1.25 1.88 8.2 7  56 32      Wrist 3.49 4.27 7.9 14  40 32   L Median - Digit II (Antidromic)      Mid Palm 1.15 1.88 26.3 7  61 32      Wrist 2.86 3.59 25.7 14  49 32   R Ulnar - Digit V (Antidromic)      Wrist 3.33 4.06 6.7 14  42 32   L Ulnar - Digit V (Antidromic)      Wrist 2.76 3.44 2.9 14  51 32   R Radial - Anatomical snuff box (Forearm)      Forearm 1.30 2.19 9.9 10  77 32   L Radial - Anatomical snuff box (Forearm)      Forearm 1.82 2.34 12.7 10  55 32   R Dorsal ulnar cutaneous - Hand dorsum (Forearm)      Forearm

## 2024-12-19 ENCOUNTER — HOSPITAL ENCOUNTER (OUTPATIENT)
Dept: OPERATING ROOM | Age: 64
Setting detail: OUTPATIENT SURGERY
Discharge: HOME | End: 2024-12-19
Attending: PAIN MEDICINE
Payer: MEDICARE

## 2024-12-19 ENCOUNTER — HOSPITAL ENCOUNTER (OUTPATIENT)
Age: 64
Setting detail: OUTPATIENT SURGERY
Discharge: HOME | End: 2024-12-19
Attending: PAIN MEDICINE | Admitting: PAIN MEDICINE
Payer: MEDICARE

## 2024-12-19 VITALS
RESPIRATION RATE: 14 BRPM | WEIGHT: 238 LBS | SYSTOLIC BLOOD PRESSURE: 124 MMHG | TEMPERATURE: 97.2 F | BODY MASS INDEX: 30.54 KG/M2 | DIASTOLIC BLOOD PRESSURE: 76 MMHG | OXYGEN SATURATION: 95 % | HEART RATE: 84 BPM | HEIGHT: 74 IN

## 2024-12-19 DIAGNOSIS — M51.9 LUMBAR DISC DISEASE: ICD-10-CM

## 2024-12-19 PROCEDURE — 64483 NJX AA&/STRD TFRM EPI L/S 1: CPT | Performed by: PAIN MEDICINE

## 2024-12-19 PROCEDURE — 6360000002 HC RX W HCPCS: Performed by: PAIN MEDICINE

## 2024-12-19 PROCEDURE — 6360000004 HC RX CONTRAST MEDICATION: Performed by: PAIN MEDICINE

## 2024-12-19 PROCEDURE — 3600000005 HC SURGERY LEVEL 5 BASE: Performed by: PAIN MEDICINE

## 2024-12-19 PROCEDURE — 7100000010 HC PHASE II RECOVERY - FIRST 15 MIN: Performed by: PAIN MEDICINE

## 2024-12-19 PROCEDURE — 7100000011 HC PHASE II RECOVERY - ADDTL 15 MIN: Performed by: PAIN MEDICINE

## 2024-12-19 PROCEDURE — 64484 NJX AA&/STRD TFRM EPI L/S EA: CPT | Performed by: PAIN MEDICINE

## 2024-12-19 PROCEDURE — 2709999900 HC NON-CHARGEABLE SUPPLY: Performed by: PAIN MEDICINE

## 2024-12-19 RX ORDER — SODIUM CHLORIDE 0.9 % (FLUSH) 0.9 %
5-40 SYRINGE (ML) INJECTION PRN
Status: DISCONTINUED | OUTPATIENT
Start: 2024-12-19 | End: 2024-12-19 | Stop reason: HOSPADM

## 2024-12-19 RX ORDER — SODIUM CHLORIDE 9 MG/ML
INJECTION, SOLUTION INTRAVENOUS PRN
Status: DISCONTINUED | OUTPATIENT
Start: 2024-12-19 | End: 2024-12-19 | Stop reason: HOSPADM

## 2024-12-19 RX ORDER — LIDOCAINE HYDROCHLORIDE 5 MG/ML
INJECTION, SOLUTION INFILTRATION; INTRAVENOUS PRN
Status: DISCONTINUED | OUTPATIENT
Start: 2024-12-19 | End: 2024-12-19 | Stop reason: ALTCHOICE

## 2024-12-19 RX ORDER — SODIUM CHLORIDE 0.9 % (FLUSH) 0.9 %
5-40 SYRINGE (ML) INJECTION EVERY 12 HOURS SCHEDULED
Status: DISCONTINUED | OUTPATIENT
Start: 2024-12-19 | End: 2024-12-19 | Stop reason: HOSPADM

## 2024-12-19 ASSESSMENT — PAIN - FUNCTIONAL ASSESSMENT
PAIN_FUNCTIONAL_ASSESSMENT: NONE - DENIES PAIN
PAIN_FUNCTIONAL_ASSESSMENT: 0-10
PAIN_FUNCTIONAL_ASSESSMENT: NONE - DENIES PAIN

## 2024-12-19 NOTE — H&P
[facility]  [address], [place] [address]  [phone]    Procedure History & Physical      Per Stroud     HPI:    Patient  is here for low back and right lower extremity pain for Right L4 and L5 TFESI.  Labs/imaging studies reviewed   All question and concerns addressed including R/B/A associated with the procedure    Past Medical History:   Diagnosis Date    Acid reflux     GERD (gastroesophageal reflux disease)     Hyperlipidemia     Hypertension     Hypokalemia     Hypomagnesemia     Low back pain     Neuropathy     Years ago       Past Surgical History:   Procedure Laterality Date    ANESTHESIA NERVE BLOCK Right 01/14/2020    RIGHT L2,3,4 MEDIAL BRANCH BLOCK WITHOUT SEDATION (CPT 64483,40817) performed by Sean Cervantes MD at [facility] OR    ANESTHESIA NERVE BLOCK Right 02/20/2020    RIGHT L2,3,4 MEDIAL BRANCH BLOCK (CPT 07662,53938) performed by Sean Cervantes MD at [facility] OR    ANESTHESIA NERVE BLOCK Left 06/29/2020    LEFT L2 L3 L4 MEDIAL BRANCH BLOCK (CPT 43587) (WANTS AFTERNOON APPOINTMENT) performed by Sean Cervantes MD at [facility] OR    BACK SURGERY      May 4, 2018 decompression with Dr. Daily.     CARPAL TUNNEL RELEASE Left 01/24/2014    CARPAL TUNNEL RELEASE      left    CERVICAL FUSION  11/01/2021    COLONOSCOPY      FRACTURE SURGERY      arm    FRACTURE SURGERY      left arm    HERNIA REPAIR      HERNIA REPAIR      x2 child    KNEE ARTHROSCOPY Left 02/20/2015    NERVE BLOCK Right 01/14/2020    NERVE BLOCK Right 02/20/2020    L2,3,4 medial     NERVE BLOCK Right 05/04/2020    L2,3,4,5 medial radiofrequency     NERVE BLOCK Left 06/29/2020    medial branch block    PAIN MANAGEMENT PROCEDURE Right 05/04/2020    RIGHT L2, 3, 4 MEDIAL BRANCH L5 DORSALRAMUS RADIOFREQUENCY ABLATION performed by Sean Cervantes MD at [facility] OR    SHOULDER ARTHROSCOPY Left 4/14/2023    LEFT SHOULDER ARTHROSCOPY, TENDOESIS, SUBACROMIAL DECOMPRESSION AND

## 2024-12-19 NOTE — OP NOTE
entered . A lateral fluoroscopic view was then used to place the needle tip in the middle of the foramen. Negative aspiration was confirmed for blood and CSF and 0.5 cc of Omnipaque 240 contrast was injected at each level under live fluoroscopy. Appropriate neurograms were observed under AP fluoroscopy. Then after negative aspiration, a solution of the 2 cc of 0.5% lidocaine and 40 mg DepoMedrol was easily injected at each level. The needles were removed with constant aspiration technique. The patient back was cleaned and a bandage was placed over the needle insertion points    Disposition the patient tolerated the procedure well and there were no complications . Vital signs remained stable throughout the procedure. The patient was escorted to the recovery area where they remained until discharge and written discharge instructions for the procedure were given.    Plan: Per will return to our pain management center as scheduled.     RICHARD DAVIS MD

## 2024-12-19 NOTE — DISCHARGE INSTRUCTIONS
swelling, warmth, or redness in the area.  Red streaks leading from the area.  Pus draining from the wound.  A new or higher fever.   Watch closely for changes in your health, and be sure to contact your doctor if you have any problems.  Where can you learn more?  Go to https://www.[employer]iu[employer].net/patientEd and enter C340 to learn more about \"Infection After Surgery: Care Instructions.\"  Current as of: July 26, 2023  Content Version: 14.2  © 2024 [employer].   Care instructions adapted under license by [employer]. If you have questions about a medical condition or this instruction, always ask your healthcare professional. [employer] disclaims any warranty or liability for your use of this information.         Nausea and Vomiting After Surgery: Care Instructions  Your Care Instructions     After you've had surgery, you may feel sick to your stomach (nauseated) or you may vomit. Sometimes anesthesia can make you feel sick. It's a common side effect and often doesn't last long. Pain also can make you feel sick or vomit. After the anesthesia wears off, you may feel pain from the incision (cut). That pain could then upset your stomach. Taking pain medicine can also make you feel sick to your stomach.  Whatever the cause, you may get medicine that can help. There are also some things you can do at home to prevent nausea and feel better.  The doctor has checked you carefully, but problems can develop later. If you notice any problems or new symptoms, get medical treatment right away.  Follow-up care is a key part of your treatment and safety. Be sure to make and go to all appointments, and call your doctor if you are having problems. It's also a good idea to know your test results and keep a list of the medicines you take.  How can you care for yourself at home?  Be safe with medicines. Read and follow all instructions on the label.  If the doctor gave you a prescription medicine for pain, take

## 2024-12-20 ENCOUNTER — TELEPHONE (OUTPATIENT)
Age: 64
End: 2024-12-20

## 2024-12-20 NOTE — TELEPHONE ENCOUNTER
Pt called in requesting his EMG results, stating he needs them for his ortho appointment on Monday 12/23

## 2024-12-23 ENCOUNTER — OFFICE VISIT (OUTPATIENT)
Dept: ORTHOPEDIC SURGERY | Age: 64
End: 2024-12-23
Payer: MEDICARE

## 2024-12-23 VITALS — TEMPERATURE: 97.5 F | WEIGHT: 240 LBS | HEIGHT: 74 IN | OXYGEN SATURATION: 99 % | BODY MASS INDEX: 30.8 KG/M2

## 2024-12-23 DIAGNOSIS — G56.23 CUBITAL TUNNEL SYNDROME OF BOTH UPPER EXTREMITIES: Primary | ICD-10-CM

## 2024-12-23 PROCEDURE — G8484 FLU IMMUNIZE NO ADMIN: HCPCS | Performed by: ORTHOPAEDIC SURGERY

## 2024-12-23 PROCEDURE — 3017F COLORECTAL CA SCREEN DOC REV: CPT | Performed by: ORTHOPAEDIC SURGERY

## 2024-12-23 PROCEDURE — 1036F TOBACCO NON-USER: CPT | Performed by: ORTHOPAEDIC SURGERY

## 2024-12-23 PROCEDURE — G8417 CALC BMI ABV UP PARAM F/U: HCPCS | Performed by: ORTHOPAEDIC SURGERY

## 2024-12-23 PROCEDURE — 1123F ACP DISCUSS/DSCN MKR DOCD: CPT | Performed by: ORTHOPAEDIC SURGERY

## 2024-12-23 PROCEDURE — 99214 OFFICE O/P EST MOD 30 MIN: CPT | Performed by: ORTHOPAEDIC SURGERY

## 2024-12-23 PROCEDURE — G8427 DOCREV CUR MEDS BY ELIG CLIN: HCPCS | Performed by: ORTHOPAEDIC SURGERY

## 2024-12-23 RX ORDER — BACLOFEN 10 MG/1
10 TABLET ORAL 3 TIMES DAILY
COMMUNITY

## 2024-12-23 NOTE — PROGRESS NOTES
Chief Complaint   Patient presents with    Elbow Pain     Patient is here for bilateral elbow pain. Patient states he had an EMG and was referred here to schedule surgery.       Per Stroud  male  presents today for evaluation of his b/l elbow pain L>R.  The patient states the pain started  2 years ago.  The pain is aching and numbness, tingling  in nature.  He has been having numbness from the elbow down into the entire hand.  It is worse with movement, lifting and better with rest.  The patient does complain of night pain.  The patient is right hand dominant.  The patient is not working at this time.     Past Medical History:   Diagnosis Date    Acid reflux     GERD (gastroesophageal reflux disease)     Hyperlipidemia     Hypertension     Hypokalemia     Hypomagnesemia     Low back pain     Neuropathy     Years ago     Past Surgical History:   Procedure Laterality Date    ANESTHESIA NERVE BLOCK Right 01/14/2020    RIGHT L2,3,4 MEDIAL BRANCH BLOCK WITHOUT SEDATION (CPT 31153,21525) performed by Sean Cervantes MD at [facility] OR    ANESTHESIA NERVE BLOCK Right 02/20/2020    RIGHT L2,3,4 MEDIAL BRANCH BLOCK (CPT 16105,45633) performed by Sean Cervantes MD at [facility] OR    ANESTHESIA NERVE BLOCK Left 06/29/2020    LEFT L2 L3 L4 MEDIAL BRANCH BLOCK (CPT 92641) (WANTS AFTERNOON APPOINTMENT) performed by Sean Cervantes MD at [facility] OR    BACK SURGERY      May 4, 2018 decompression with Dr. Daily.     CARPAL TUNNEL RELEASE Left 01/24/2014    CARPAL TUNNEL RELEASE      left    CERVICAL FUSION  11/01/2021    COLONOSCOPY      FRACTURE SURGERY      arm    FRACTURE SURGERY      left arm    HERNIA REPAIR      HERNIA REPAIR      x2 child    KNEE ARTHROSCOPY Left 02/20/2015    NERVE BLOCK Right 01/14/2020    NERVE BLOCK Right 02/20/2020    L2,3,4 medial     NERVE BLOCK Right 05/04/2020    L2,3,4,5 medial radiofrequency     NERVE BLOCK Left 06/29/2020    medial branch block    PAIN MANAGEMENT PROCEDURE

## 2024-12-30 ENCOUNTER — EVALUATION (OUTPATIENT)
Dept: PHYSICAL THERAPY | Age: 64
End: 2024-12-30

## 2024-12-30 DIAGNOSIS — G56.23 ULNAR NEUROPATHY OF BOTH UPPER EXTREMITIES: ICD-10-CM

## 2024-12-30 DIAGNOSIS — G95.9 CERVICAL MYELOPATHY (HCC): Primary | ICD-10-CM

## 2025-01-07 ENCOUNTER — OFFICE VISIT (OUTPATIENT)
Dept: PAIN MANAGEMENT | Age: 65
End: 2025-01-07
Payer: MEDICARE

## 2025-01-07 VITALS
SYSTOLIC BLOOD PRESSURE: 142 MMHG | OXYGEN SATURATION: 98 % | WEIGHT: 240 LBS | RESPIRATION RATE: 18 BRPM | TEMPERATURE: 96.2 F | BODY MASS INDEX: 30.8 KG/M2 | HEART RATE: 82 BPM | HEIGHT: 74 IN | DIASTOLIC BLOOD PRESSURE: 86 MMHG

## 2025-01-07 DIAGNOSIS — M53.3 DISORDER OF SACRUM: ICD-10-CM

## 2025-01-07 DIAGNOSIS — M47.816 LUMBAR FACET ARTHROPATHY: Primary | ICD-10-CM

## 2025-01-07 DIAGNOSIS — M54.16 LUMBAR RADICULOPATHY: ICD-10-CM

## 2025-01-07 DIAGNOSIS — M51.9 LUMBAR DISC DISORDER: ICD-10-CM

## 2025-01-07 DIAGNOSIS — M47.816 LUMBAR SPONDYLOSIS: ICD-10-CM

## 2025-01-07 DIAGNOSIS — M96.1 LUMBAR POSTLAMINECTOMY SYNDROME: ICD-10-CM

## 2025-01-07 PROCEDURE — 1036F TOBACCO NON-USER: CPT | Performed by: PAIN MEDICINE

## 2025-01-07 PROCEDURE — M1308 PR FLU IMMUNIZE NO ADMIN: HCPCS | Performed by: PAIN MEDICINE

## 2025-01-07 PROCEDURE — 3077F SYST BP >= 140 MM HG: CPT | Performed by: PAIN MEDICINE

## 2025-01-07 PROCEDURE — 99213 OFFICE O/P EST LOW 20 MIN: CPT | Performed by: PAIN MEDICINE

## 2025-01-07 PROCEDURE — G8417 CALC BMI ABV UP PARAM F/U: HCPCS | Performed by: PAIN MEDICINE

## 2025-01-07 PROCEDURE — 1123F ACP DISCUSS/DSCN MKR DOCD: CPT | Performed by: PAIN MEDICINE

## 2025-01-07 PROCEDURE — 3017F COLORECTAL CA SCREEN DOC REV: CPT | Performed by: PAIN MEDICINE

## 2025-01-07 PROCEDURE — G8427 DOCREV CUR MEDS BY ELIG CLIN: HCPCS | Performed by: PAIN MEDICINE

## 2025-01-07 PROCEDURE — 3079F DIAST BP 80-89 MM HG: CPT | Performed by: PAIN MEDICINE

## 2025-01-07 RX ORDER — PREGABALIN 75 MG/1
75 CAPSULE ORAL 2 TIMES DAILY
Qty: 60 CAPSULE | Refills: 1 | Status: SHIPPED | OUTPATIENT
Start: 2025-01-07 | End: 2025-03-08

## 2025-01-07 NOTE — PROGRESS NOTES
[facility]  [address] [place] [address]  [phone]    Follow up Note      Per Juanlandy     Date of Visit:  1/7/2025    CC:  Patient presents for follow up   Chief Complaint   Patient presents with    Follow-up     Right Lumbar 4 and Lumbar 5 transforaminal epidural steroid injection.       HPI:  Pain is better.  Appropriate analgesia with current medications regimen: no.    Change in quality of symptoms:no.    Medication side effects:drowsiness.   Recent diagnostic testing:none.   Recent interventional procedures: Right L4 and L5 TFESI with more than 80% improvement in his radicular symptoms    He has not been on anticoagulation medications to include none. The patient  has not been on herbal supplements.  The patient is not diabetic.     Imaging:   Lumbar spine MRI 2024  T12-L1: The previously described cystic space occupying lesion in the   left lateral epidural space has essentially resolved. There is persistent   mild right and mild to moderate left facet hypertrophy. Previously noted   canal stenosis as essentially resolved. There is a persistent disc bulge   extending into the inferior neural foramina. There is stable mild to   moderate left-sided facet hypertrophy. There is persistent moderate right   and mild to moderate left foraminal stenosis.     L1-2: There is mild facet hypertrophy.  No significant central canal or   foraminal stenosis.  Findings are stable.     L2-3: There is a left paracentral and foraminal disc osteophyte complex.    It abuts both the left exiting L2 nerve and traversing L3 nerve root.    There is moderate facet and ligamentum flavum hypertrophy.  There is   moderate central canal stenosis.  There is moderate to severe right   foraminal stenosis. Findings are stable.     L3-4: There is a laminectomy defect. There is a left foraminal disc   herniation and/or epidural fibrosis abutting the left exiting L3 nerve.   The central

## 2025-01-07 NOTE — PROGRESS NOTES
Per Stroud presents to the [facility] on 1/7/2025. Per is complaining of pain in his lower back, no longer radiates to right hip and RLE. The pain is constant. The pain is described as aching and sore. Pain is rated on his best day at a 6, on his worst day at a 8, and on average at a 6 on the VAS scale. He took his last dose of Lyrica, Motrin, Baclofen today.      Any procedures since your last visit: Yes, with 60% relief    He is  on NSAIDS and  is not on anticoagulation medications to include none and is managed by NA.     Pacemaker or defibrillator: No     Medication Contract and Consent for Opioid Use Documents Filed        No documents found                       Resp 18   Ht 1.88 m (6' 2.02\")   Wt 108.9 kg (240 lb)   BMI 30.80 kg/m²      No LMP for male patient.

## 2025-01-17 ENCOUNTER — OFFICE VISIT (OUTPATIENT)
Age: 65
End: 2025-01-17

## 2025-01-17 VITALS
HEIGHT: 74 IN | BODY MASS INDEX: 31.83 KG/M2 | DIASTOLIC BLOOD PRESSURE: 88 MMHG | SYSTOLIC BLOOD PRESSURE: 158 MMHG | HEART RATE: 84 BPM | WEIGHT: 248 LBS

## 2025-01-17 DIAGNOSIS — M54.16 SPINAL STENOSIS OF LUMBAR REGION WITH RADICULOPATHY: ICD-10-CM

## 2025-01-17 DIAGNOSIS — R79.9 ABNORMAL BLOOD CHEMISTRY: ICD-10-CM

## 2025-01-17 DIAGNOSIS — Z86.39 HISTORY OF VITAMIN D DEFICIENCY: ICD-10-CM

## 2025-01-17 DIAGNOSIS — G60.3 IDIOPATHIC PROGRESSIVE NEUROPATHY: ICD-10-CM

## 2025-01-17 DIAGNOSIS — M48.061 SPINAL STENOSIS OF LUMBAR REGION WITH RADICULOPATHY: ICD-10-CM

## 2025-01-17 DIAGNOSIS — G95.9 CERVICAL MYELOPATHY (HCC): ICD-10-CM

## 2025-01-17 DIAGNOSIS — R42 DIZZINESS: ICD-10-CM

## 2025-01-17 DIAGNOSIS — R53.83 OTHER FATIGUE: ICD-10-CM

## 2025-01-17 DIAGNOSIS — G56.23 ULNAR NEUROPATHY OF BOTH UPPER EXTREMITIES: Primary | ICD-10-CM

## 2025-01-17 NOTE — PROGRESS NOTES
with and without contrast    Recommend blood work for nutritional deficiency, metabolic factors, autoimmune factors, paraneoplastic factors, Lyme disease for now    Recommend seeing a neurosurgeon at [facility] after the MRI of the cervical spine    Continue physical therapy.       - MRI CERVICAL SPINE W WO CONTRAST; Future  - Folate; Future  - Vitamin B1; Future  - Vitamin B12; Future  - Vitamin B6; Future  - Vitamin D 25 Hydroxy; Future  - FABIÁN; Future  - Rheumatoid Factor; Future  - Angiotensin Converting Enzyme; Future  - Sedimentation Rate; Future  - C4 Complement; Future  - C-Reactive Protein; Future  - C3 Complement; Future  - REYNALDO Profile; Future  - LYME DISEASE CHRONIC REFLEXIVE PANEL; Future  - RPR; Future  - Immunofixation urine random profile; Future  - Immunofixation serum profile; Future  - Hemoglobin A1C; Future  - Copper, Serum; Future  - TSH; Future  - T4, Free; Future  - MISCELLANEOUS SENDOUT ANTI HU AB; Future  - Tenisha - Chen Hightower MD, Neurosurgery, [place]    3. Spinal stenosis of lumbar region with radiculopathy  Has moderate L4-L5 lumbar spinal stenosis which can cause the changes in EMG in the lumbar level  Had radicular pain which has since improved with transforaminal epidural injection in December 2024  Continue Lyrica and flexeril  Restrict the usage of ibuprofen    4. Idiopathic progressive neuropathy  Has a severe peripheral neuropathy of axonal loss type in bilateral lower extremities  Is idiopathic, not a known diabetic and no history of alcohol use    Recommend blood workup to find out the etiology of peripheral neuropathy  - MRI CERVICAL SPINE W WO CONTRAST; Future  - MRI BRAIN W WO CONTRAST; Future  - Folate; Future  - Vitamin B1; Future  - Vitamin B12; Future  - Vitamin B6; Future  - Vitamin D 25 Hydroxy; Future  - FABIÁN; Future  - Rheumatoid Factor; Future  - Angiotensin Converting Enzyme; Future  - Sedimentation Rate; Future  - C4 Complement; Future  - C-Reactive Protein;

## 2025-01-20 DIAGNOSIS — R79.9 ABNORMAL BLOOD CHEMISTRY: ICD-10-CM

## 2025-01-20 DIAGNOSIS — Z86.39 HISTORY OF VITAMIN D DEFICIENCY: ICD-10-CM

## 2025-01-20 DIAGNOSIS — G60.3 IDIOPATHIC PROGRESSIVE NEUROPATHY: ICD-10-CM

## 2025-01-20 DIAGNOSIS — G95.9 CERVICAL MYELOPATHY (HCC): ICD-10-CM

## 2025-01-20 DIAGNOSIS — R53.83 OTHER FATIGUE: ICD-10-CM

## 2025-01-20 LAB
C-REACTIVE PROTEIN: <3 MG/L (ref 0–5)
COMPLEMENT C3: 148 MG/DL (ref 90–180)
COMPLEMENT C4: 20 MG/DL (ref 10–40)
FOLATE: >20 NG/ML (ref 4.8–24.2)
HBA1C MFR BLD: 5.9 % (ref 4–5.6)
RHEUMATOID FACTOR: 45 IU/ML (ref 0–13)
SED RATE, AUTOMATED: 7 MM/HR (ref 0–15)
T4 FREE: 1.4 NG/DL (ref 0.9–1.7)
TSH SERPL DL<=0.05 MIU/L-ACNC: 1.94 UIU/ML (ref 0.27–4.2)
VITAMIN B-12: 595 PG/ML (ref 211–946)
VITAMIN D 25-HYDROXY: 51.7 NG/ML (ref 30–100)

## 2025-01-21 LAB
ANTI RNP AB: NEGATIVE
ANTI-NUCLEAR ANTIBODY (ANA): NEGATIVE
ANTI-SMITH: NEGATIVE
JO-1 ANTIBODY: NEGATIVE
RPR: NONREACTIVE
SCLERODERMA (SCL-70) AB: NEGATIVE
SJOGREN'S ANTIBODIES (SSA): NEGATIVE
SJOGREN'S ANTIBODIES (SSB): NEGATIVE

## 2025-01-22 ENCOUNTER — PREP FOR PROCEDURE (OUTPATIENT)
Dept: ORTHOPEDIC SURGERY | Age: 65
End: 2025-01-22

## 2025-01-22 DIAGNOSIS — G56.21 CUBITAL TUNNEL SYNDROME ON RIGHT: ICD-10-CM

## 2025-01-22 PROBLEM — G56.23 ULNAR NEUROPATHY OF BOTH UPPER EXTREMITIES: Status: ACTIVE | Noted: 2025-01-22

## 2025-01-22 PROBLEM — G95.9 CERVICAL MYELOPATHY (HCC): Status: ACTIVE | Noted: 2025-01-22

## 2025-01-22 LAB
ALBUMIN (CALCULATED): 3.8 G/DL (ref 3.5–4.7)
ALPHA-1-GLOBULIN: 0.3 G/DL (ref 0.2–0.4)
ALPHA-2-GLOBULIN: 0.7 G/DL (ref 0.5–1)
ANGIOTENSIN CONVERTING ENZYME: 30 U/L (ref 16–85)
BETA GLOBULIN: 1.2 G/DL (ref 0.8–1.3)
GAMMA GLOBULIN: 1 G/DL (ref 0.7–1.6)
M SPIKE 1 SERUM PROTEIN ELEC: 0.5 G/DL
P E INTERPRETATION, U: NORMAL
PATHOLOGIST REVIEW: NORMAL
PATHOLOGIST: NORMAL
PATHOLOGIST: NORMAL
PROTEIN ELECTROPHORESIS, SERUM: NORMAL
SERUM IFX INTERP: NORMAL
SPECIMEN TYPE: NORMAL
TOTAL PROTEIN: 7 G/DL (ref 6.4–8.3)
URINE IFX INTERP: NORMAL
URINE IFX SPECIMEN: NORMAL

## 2025-01-23 LAB
BORRELIA BURGDORFERI ABS TOTAL: 0.16 IV
COPPER: 82.5 UG/DL (ref 70–140)
VITAMIN B1 WHOLE BLOOD: 238 NMOL/L (ref 70–180)

## 2025-01-24 LAB — VITAMIN B6: 86 NMOL/L (ref 20–125)

## 2025-01-26 LAB
Lab: NEGATIVE

## 2025-01-30 RX ORDER — TAMSULOSIN HYDROCHLORIDE 0.4 MG/1
0.4 CAPSULE ORAL DAILY
COMMUNITY
Start: 2025-01-19

## 2025-02-03 ENCOUNTER — HOSPITAL ENCOUNTER (OUTPATIENT)
Age: 65
Discharge: HOME | End: 2025-02-03

## 2025-02-03 DIAGNOSIS — G56.21 CUBITAL TUNNEL SYNDROME ON RIGHT: ICD-10-CM

## 2025-02-04 ENCOUNTER — TREATMENT (OUTPATIENT)
Dept: PHYSICAL THERAPY | Age: 65
End: 2025-02-04
Payer: MEDICARE

## 2025-02-04 DIAGNOSIS — G56.23 ULNAR NEUROPATHY OF BOTH UPPER EXTREMITIES: ICD-10-CM

## 2025-02-04 DIAGNOSIS — G95.9 CERVICAL MYELOPATHY (HCC): Primary | ICD-10-CM

## 2025-02-04 PROCEDURE — 97110 THERAPEUTIC EXERCISES: CPT | Performed by: PHYSICAL THERAPIST

## 2025-02-04 PROCEDURE — 97530 THERAPEUTIC ACTIVITIES: CPT | Performed by: PHYSICAL THERAPIST

## 2025-02-04 NOTE — PROGRESS NOTES
Physical Therapy Daily Treatment Note    Date: 2025  Patient Name: Per Stroud  : 1960   MRN: [id number]  DOInjury: -  DOSx: -  Referring Provider: Carol Romero MD   Medical Diagnosis:   G95.9 (ICD-10-CM) - Cervical myelopathy (HCC)  G56.23 (ICD-10-CM) - Ulnar neuropathy of both upper extremities    Outcome Measure:  QuickDASH 72% disability      X = TO BE PERFORMED NEXT VISIT  > = PROGRESS TO THIS FIRST  >> = PROGRESS TO THIS SECOND  >>> = PROGRESS TO THIS THIRD    S: See eval  O: Discussed anatomy, physiology, body mechanics, principles of loading, and progressive loading/activity.  Reviewed home exercise program extensively; written copy provided.     Access Code: [email or web address]  URL: [email or web address]  Date: 2025  Prepared by: Heriberto Alexander    Exercises  - Standing Marching  - 2 x daily - 7 x weekly - 3 sets - 15 reps  - Standing Heel Raise  - 2 x daily - 7 x weekly - 2 sets - 15 reps  - Side Kicks  - 2 x daily - 7 x weekly - 2 sets - 10 reps  - Seated Overhead Press with Bar  - 2 x daily - 7 x weekly - 3 sets - 15 reps    Time 3094-3167     Visit 2 Repeat outcome measure at mid point and end.    Pain Pain at rest 0/10     ROM      Modalities            Exercise      Nustep   10 min  TE         Squats   TA   Calf Raises   TA   Toe Raises   TA   Marches 3 x 15  TA   Alt. Sidekicks 3 x 15  TA   Calf raises 3 x 15     Sit/Stands x  TA   Wand shld presses 3 x 15  TA   Gait training   GT         Marching Gait   NR   Sidestepping   NR                                 A:  Tolerated well.    P: Continue with rehab plan  Heriberto Alexander PT    Treatment Charges: Mins Units   Initial Evaluation     Re-Evaluation     Ther Exercise         TE 10 1   Manual Therapy     MT     Ther Activities        TA 30 2   Gait Training          GT     Neuro Re-education NR     Modalities     Non-Billable Service Time     Other     Total Time/Units 40 3

## 2025-02-06 ENCOUNTER — TREATMENT (OUTPATIENT)
Dept: PHYSICAL THERAPY | Age: 65
End: 2025-02-06
Payer: MEDICARE

## 2025-02-06 ENCOUNTER — ANESTHESIA EVENT (OUTPATIENT)
Dept: OPERATING ROOM | Age: 65
End: 2025-02-06
Payer: MEDICARE

## 2025-02-06 DIAGNOSIS — G95.9 CERVICAL MYELOPATHY (HCC): Primary | ICD-10-CM

## 2025-02-06 PROCEDURE — 97110 THERAPEUTIC EXERCISES: CPT | Performed by: PHYSICAL THERAPIST

## 2025-02-06 PROCEDURE — 97530 THERAPEUTIC ACTIVITIES: CPT | Performed by: PHYSICAL THERAPIST

## 2025-02-06 ASSESSMENT — COPD QUESTIONNAIRES: CAT_SEVERITY: MILD

## 2025-02-06 NOTE — ANESTHESIA PRE PROCEDURE
OR    PAIN MANAGEMENT PROCEDURE Right 2024    Right Lumbar 4 and Lumbar 5 transforaminal epidural steroid injection. performed by Sean Cervantes MD at [facility] OR    SHOULDER ARTHROSCOPY Left 2023    LEFT SHOULDER ARTHROSCOPY, TENDOESIS, SUBACROMIAL DECOMPRESSION AND DEBRIDEMENT performed by Dhaval Molina DO at [facility] OR    TONSILLECTOMY      TOTAL KNEE ARTHROPLASTY Left 04/15/2024    LEFT KNEE TOTAL KNEE ARTHROPLASTY-SMITH AND NEPHEW performed by Dhaval Molina DO at [facility] OR       Social History:    Social History     Tobacco Use    Smoking status: Former     Current packs/day: 0.00     Average packs/day: 1 pack/day for 25.0 years (25.0 ttl pk-yrs)     Types: Cigarettes     Start date: 1992     Quit date: 2017     Years since quittin.9    Smokeless tobacco: Never    Tobacco comments:     7+ years amoking/ nicotine free   Substance Use Topics    Alcohol use: Yes     Alcohol/week: 1.0 standard drink of alcohol     Types: 1 Drinks containing 0.5 oz of alcohol per week     Comment: Very rarely                                Counseling given: Not Answered  Tobacco comments: 7+ years amoking/ nicotine free      Vital Signs (Current):   Vitals:    25 1432   Weight: 111.1 kg (245 lb)   Height: 1.854 m (6' 1\")                                              BP Readings from Last 3 Encounters:   25 (!) 158/88   25 (!) 142/86   24 124/76       NPO Status:                                                                                 BMI:   Wt Readings from Last 3 Encounters:   25 112.5 kg (248 lb)   25 108.9 kg (240 lb)   24 108.9 kg (240 lb)     Body mass index is 32.32 kg/m².    CBC:   Lab Results   Component Value Date/Time    WBC 7.3 2024 09:00 AM    RBC 4.48 2024 09:00 AM    HGB 14.7 2024 09:00 AM    HCT 43.7 2024 09:00 AM    MCV 97.5 2024 09:00 AM    RDW 12.9 2024 09:00 AM     2024 09:00 AM

## 2025-02-06 NOTE — PROGRESS NOTES
Physical Therapy Daily Treatment Note    Date: 2025  Patient Name: Per Stroud  : 1960   MRN: [id number]  DOInjury: -  DOSx: -  Referring Provider:   Carol Romero MD   Medical Diagnosis:   G95.9 (ICD-10-CM) - Cervical myelopathy (HCC)  G56.23 (ICD-10-CM) - Ulnar neuropathy of both upper extremities    Outcome Measure:  QuickDASH 72% disability      X = TO BE PERFORMED NEXT VISIT  > = PROGRESS TO THIS FIRST  >> = PROGRESS TO THIS SECOND  >>> = PROGRESS TO THIS THIRD    S: See eval  O: Discussed anatomy, physiology, body mechanics, principles of loading, and progressive loading/activity.  Reviewed home exercise program extensively; written copy provided.     Access Code: [email or web address]  URL: [email or web address]  Date: 2025  Prepared by: Heriberto Alexander    Exercises  - Standing Marching  - 2 x daily - 7 x weekly - 3 sets - 15 reps  - Standing Heel Raise  - 2 x daily - 7 x weekly - 2 sets - 15 reps  - Side Kicks  - 2 x daily - 7 x weekly - 2 sets - 10 reps  - Seated Overhead Press with Bar  - 2 x daily - 7 x weekly - 3 sets - 15 reps    Time 0123-8390     Visit 2 Repeat outcome measure at mid point and end.    Pain      ROM      Modalities            Exercise      Nustep   15 min To increase work capacity TA         Squats   TA   Calf Raises   TA   Toe Raises   TA   Marches 3 x 10  TA   Alt. Sidekicks 3 x 10  TA         Sit/Stands x  TA         Gait training   GT         Marching Gait   NR   Sidestepping   NR   Wand flex 3 x 15  TE                           A:  Tolerated well.    P: Continue with rehab plan  Heriberto Alexander PT    Treatment Charges: Mins Units   Initial Evaluation     Re-Evaluation     Ther Exercise         TE 10 1   Manual Therapy     MT     Ther Activities        TA 30 2   Gait Training          GT     Neuro Re-education NR     Modalities     Non-Billable Service Time     Other     Total Time/Units 40 3

## 2025-02-07 ENCOUNTER — HOSPITAL ENCOUNTER (OUTPATIENT)
Age: 65
Setting detail: OUTPATIENT SURGERY
Discharge: HOME | End: 2025-02-07
Attending: ORTHOPAEDIC SURGERY | Admitting: ORTHOPAEDIC SURGERY
Payer: MEDICARE

## 2025-02-07 ENCOUNTER — ANESTHESIA (OUTPATIENT)
Dept: OPERATING ROOM | Age: 65
End: 2025-02-07
Payer: MEDICARE

## 2025-02-07 VITALS
DIASTOLIC BLOOD PRESSURE: 65 MMHG | TEMPERATURE: 97.5 F | SYSTOLIC BLOOD PRESSURE: 107 MMHG | BODY MASS INDEX: 32.2 KG/M2 | HEART RATE: 75 BPM | RESPIRATION RATE: 16 BRPM | OXYGEN SATURATION: 95 % | HEIGHT: 73 IN | WEIGHT: 243 LBS

## 2025-02-07 DIAGNOSIS — G56.23 ULNAR NEUROPATHY OF BOTH UPPER EXTREMITIES: ICD-10-CM

## 2025-02-07 DIAGNOSIS — G56.21 CUBITAL TUNNEL SYNDROME ON RIGHT: Primary | ICD-10-CM

## 2025-02-07 LAB
EKG ATRIAL RATE: 66 BPM
EKG P AXIS: 2 DEGREES
EKG P-R INTERVAL: 176 MS
EKG Q-T INTERVAL: 342 MS
EKG QRS DURATION: 82 MS
EKG QTC CALCULATION (BAZETT): 358 MS
EKG R AXIS: 20 DEGREES
EKG T AXIS: 32 DEGREES
EKG VENTRICULAR RATE: 66 BPM

## 2025-02-07 PROCEDURE — 3600000012 HC SURGERY LEVEL 2 ADDTL 15MIN: Performed by: ORTHOPAEDIC SURGERY

## 2025-02-07 PROCEDURE — 2580000003 HC RX 258

## 2025-02-07 PROCEDURE — 6360000002 HC RX W HCPCS

## 2025-02-07 PROCEDURE — 7100000010 HC PHASE II RECOVERY - FIRST 15 MIN: Performed by: ORTHOPAEDIC SURGERY

## 2025-02-07 PROCEDURE — 7100000000 HC PACU RECOVERY - FIRST 15 MIN: Performed by: ORTHOPAEDIC SURGERY

## 2025-02-07 PROCEDURE — 3600000002 HC SURGERY LEVEL 2 BASE: Performed by: ORTHOPAEDIC SURGERY

## 2025-02-07 PROCEDURE — 2500000003 HC RX 250 WO HCPCS

## 2025-02-07 PROCEDURE — 7100000001 HC PACU RECOVERY - ADDTL 15 MIN: Performed by: ORTHOPAEDIC SURGERY

## 2025-02-07 PROCEDURE — 2709999900 HC NON-CHARGEABLE SUPPLY: Performed by: ORTHOPAEDIC SURGERY

## 2025-02-07 PROCEDURE — 7100000011 HC PHASE II RECOVERY - ADDTL 15 MIN: Performed by: ORTHOPAEDIC SURGERY

## 2025-02-07 PROCEDURE — 2580000003 HC RX 258: Performed by: ANESTHESIOLOGY

## 2025-02-07 PROCEDURE — 3700000000 HC ANESTHESIA ATTENDED CARE: Performed by: ORTHOPAEDIC SURGERY

## 2025-02-07 PROCEDURE — 6360000002 HC RX W HCPCS: Performed by: ORTHOPAEDIC SURGERY

## 2025-02-07 PROCEDURE — 6370000000 HC RX 637 (ALT 250 FOR IP): Performed by: ANESTHESIOLOGY

## 2025-02-07 PROCEDURE — 3700000001 HC ADD 15 MINUTES (ANESTHESIA): Performed by: ORTHOPAEDIC SURGERY

## 2025-02-07 PROCEDURE — 64718 REVISE ULNAR NERVE AT ELBOW: CPT | Performed by: ORTHOPAEDIC SURGERY

## 2025-02-07 RX ORDER — PHENYLEPHRINE HYDROCHLORIDE 10 MG/ML
INJECTION INTRAVENOUS
Status: DISCONTINUED | OUTPATIENT
Start: 2025-02-07 | End: 2025-02-07 | Stop reason: SDUPTHER

## 2025-02-07 RX ORDER — OXYCODONE HYDROCHLORIDE 5 MG/1
5 TABLET ORAL PRN
Status: COMPLETED | OUTPATIENT
Start: 2025-02-07 | End: 2025-02-07

## 2025-02-07 RX ORDER — ONDANSETRON 2 MG/ML
4 INJECTION INTRAMUSCULAR; INTRAVENOUS
Status: DISCONTINUED | OUTPATIENT
Start: 2025-02-07 | End: 2025-02-07 | Stop reason: HOSPADM

## 2025-02-07 RX ORDER — NALOXONE HYDROCHLORIDE 0.4 MG/ML
INJECTION, SOLUTION INTRAMUSCULAR; INTRAVENOUS; SUBCUTANEOUS PRN
Status: DISCONTINUED | OUTPATIENT
Start: 2025-02-07 | End: 2025-02-07 | Stop reason: HOSPADM

## 2025-02-07 RX ORDER — SODIUM CHLORIDE, SODIUM LACTATE, POTASSIUM CHLORIDE, CALCIUM CHLORIDE 600; 310; 30; 20 MG/100ML; MG/100ML; MG/100ML; MG/100ML
INJECTION, SOLUTION INTRAVENOUS CONTINUOUS
Status: DISCONTINUED | OUTPATIENT
Start: 2025-02-07 | End: 2025-02-07 | Stop reason: HOSPADM

## 2025-02-07 RX ORDER — SODIUM CHLORIDE 9 MG/ML
INJECTION, SOLUTION INTRAVENOUS
Status: DISCONTINUED | OUTPATIENT
Start: 2025-02-07 | End: 2025-02-07 | Stop reason: SDUPTHER

## 2025-02-07 RX ORDER — MIDAZOLAM HYDROCHLORIDE 1 MG/ML
INJECTION, SOLUTION INTRAMUSCULAR; INTRAVENOUS
Status: DISCONTINUED | OUTPATIENT
Start: 2025-02-07 | End: 2025-02-07 | Stop reason: SDUPTHER

## 2025-02-07 RX ORDER — DEXAMETHASONE SODIUM PHOSPHATE 4 MG/ML
INJECTION, SOLUTION INTRA-ARTICULAR; INTRALESIONAL; INTRAMUSCULAR; INTRAVENOUS; SOFT TISSUE
Status: DISCONTINUED | OUTPATIENT
Start: 2025-02-07 | End: 2025-02-07 | Stop reason: SDUPTHER

## 2025-02-07 RX ORDER — MEPERIDINE HYDROCHLORIDE 25 MG/ML
12.5 INJECTION INTRAMUSCULAR; INTRAVENOUS; SUBCUTANEOUS EVERY 5 MIN PRN
Status: DISCONTINUED | OUTPATIENT
Start: 2025-02-07 | End: 2025-02-07 | Stop reason: HOSPADM

## 2025-02-07 RX ORDER — BUPIVACAINE HYDROCHLORIDE 2.5 MG/ML
INJECTION, SOLUTION EPIDURAL; INFILTRATION; INTRACAUDAL PRN
Status: DISCONTINUED | OUTPATIENT
Start: 2025-02-07 | End: 2025-02-07 | Stop reason: ALTCHOICE

## 2025-02-07 RX ORDER — HYDROCODONE BITARTRATE AND ACETAMINOPHEN 5; 325 MG/1; MG/1
1 TABLET ORAL EVERY 6 HOURS PRN
Qty: 28 TABLET | Refills: 0 | Status: SHIPPED | OUTPATIENT
Start: 2025-02-07 | End: 2025-02-14

## 2025-02-07 RX ORDER — SODIUM CHLORIDE 9 MG/ML
INJECTION, SOLUTION INTRAVENOUS PRN
Status: DISCONTINUED | OUTPATIENT
Start: 2025-02-07 | End: 2025-02-07 | Stop reason: HOSPADM

## 2025-02-07 RX ORDER — PROPOFOL 10 MG/ML
INJECTION, EMULSION INTRAVENOUS
Status: DISCONTINUED | OUTPATIENT
Start: 2025-02-07 | End: 2025-02-07 | Stop reason: SDUPTHER

## 2025-02-07 RX ORDER — SODIUM CHLORIDE 0.9 % (FLUSH) 0.9 %
5-40 SYRINGE (ML) INJECTION PRN
Status: DISCONTINUED | OUTPATIENT
Start: 2025-02-07 | End: 2025-02-07 | Stop reason: HOSPADM

## 2025-02-07 RX ORDER — SODIUM CHLORIDE 0.9 % (FLUSH) 0.9 %
5-40 SYRINGE (ML) INJECTION EVERY 12 HOURS SCHEDULED
Status: DISCONTINUED | OUTPATIENT
Start: 2025-02-07 | End: 2025-02-07 | Stop reason: HOSPADM

## 2025-02-07 RX ORDER — FENTANYL CITRATE 50 UG/ML
INJECTION, SOLUTION INTRAMUSCULAR; INTRAVENOUS
Status: DISCONTINUED | OUTPATIENT
Start: 2025-02-07 | End: 2025-02-07 | Stop reason: SDUPTHER

## 2025-02-07 RX ORDER — ONDANSETRON 2 MG/ML
INJECTION INTRAMUSCULAR; INTRAVENOUS
Status: DISCONTINUED | OUTPATIENT
Start: 2025-02-07 | End: 2025-02-07 | Stop reason: SDUPTHER

## 2025-02-07 RX ORDER — OXYCODONE HYDROCHLORIDE 5 MG/1
10 TABLET ORAL PRN
Status: COMPLETED | OUTPATIENT
Start: 2025-02-07 | End: 2025-02-07

## 2025-02-07 RX ADMIN — ONDANSETRON 4 MG: 2 INJECTION, SOLUTION INTRAMUSCULAR; INTRAVENOUS at 06:40

## 2025-02-07 RX ADMIN — FENTANYL CITRATE 50 MCG: 50 INJECTION, SOLUTION INTRAMUSCULAR; INTRAVENOUS at 06:37

## 2025-02-07 RX ADMIN — WATER 2000 MG: 1 INJECTION INTRAMUSCULAR; INTRAVENOUS; SUBCUTANEOUS at 06:30

## 2025-02-07 RX ADMIN — PHENYLEPHRINE HYDROCHLORIDE 200 MCG: 10 INJECTION INTRAVENOUS at 06:54

## 2025-02-07 RX ADMIN — PHENYLEPHRINE HYDROCHLORIDE 200 MCG: 10 INJECTION INTRAVENOUS at 06:58

## 2025-02-07 RX ADMIN — SODIUM CHLORIDE, POTASSIUM CHLORIDE, SODIUM LACTATE AND CALCIUM CHLORIDE: 600; 310; 30; 20 INJECTION, SOLUTION INTRAVENOUS at 06:05

## 2025-02-07 RX ADMIN — OXYCODONE 5 MG: 5 TABLET ORAL at 08:12

## 2025-02-07 RX ADMIN — MIDAZOLAM 2 MG: 1 INJECTION INTRAMUSCULAR; INTRAVENOUS at 06:30

## 2025-02-07 RX ADMIN — PHENYLEPHRINE HYDROCHLORIDE 100 MCG: 10 INJECTION INTRAVENOUS at 06:47

## 2025-02-07 RX ADMIN — PROPOFOL 200 MG: 10 INJECTION, EMULSION INTRAVENOUS at 06:34

## 2025-02-07 RX ADMIN — SODIUM CHLORIDE: 9 INJECTION, SOLUTION INTRAVENOUS at 06:29

## 2025-02-07 RX ADMIN — FENTANYL CITRATE 50 MCG: 50 INJECTION, SOLUTION INTRAMUSCULAR; INTRAVENOUS at 06:34

## 2025-02-07 RX ADMIN — DEXAMETHASONE SODIUM PHOSPHATE 4 MG: 4 INJECTION INTRA-ARTICULAR; INTRALESIONAL; INTRAMUSCULAR; INTRAVENOUS; SOFT TISSUE at 06:40

## 2025-02-07 ASSESSMENT — PAIN - FUNCTIONAL ASSESSMENT
PAIN_FUNCTIONAL_ASSESSMENT: 0-10

## 2025-02-07 ASSESSMENT — PAIN DESCRIPTION - ORIENTATION
ORIENTATION: RIGHT
ORIENTATION: RIGHT

## 2025-02-07 ASSESSMENT — PAIN DESCRIPTION - LOCATION
LOCATION: ELBOW
LOCATION: ELBOW

## 2025-02-07 ASSESSMENT — PAIN DESCRIPTION - DESCRIPTORS
DESCRIPTORS: ACHING
DESCRIPTORS: ACHING

## 2025-02-07 ASSESSMENT — PAIN SCALES - GENERAL
PAINLEVEL_OUTOF10: 5
PAINLEVEL_OUTOF10: 6

## 2025-02-07 NOTE — DISCHARGE INSTRUCTIONS
Cubital Tunnel Post-op Instructions    [facility]  [phone]  Dhaval Molina D.O.        Change your operative dressing on post-op day #5 (Wednesday)    Use bandaid for dressing. May remove sling when comfortable    You may shower with soap and water on post-op day #1 but keep your incision clean and dry.    Non-weight bearing activities with affected hand until post-op appointment.    No lifting over 5 pounds.     May begin moving fingers immediately.  No heavy lifting.  No pushing or pulling.    Take pain medications as prescribed.  Take with food.  If you anticipate the need for a refill on your medication and the weekend is approaching, please call the office by noon on Friday.  No refills will be called in over the weekend.  DO NOT take Tylenol products while taking prescribed pain medicine.    Resume regular diet and medications (unless otherwise directed by your doctor).    You should have a responsible adult with you for 24 hours.    If you have any questions or concerns, please call the office.       If any problems occur or if you have any further questions, please call your doctor as soon as possible.  If you find that you cannot reach your doctor but feel that your condition needs a doctor's attention go to the emergency room.         Infection After Surgery: Care Instructions  Overview  After surgery, an infection is always possible. It doesn't mean that the surgery didn't go well.  Because an infection can be serious, your doctor has taken steps to manage it.  Your doctor checked the infection and cleaned it if necessary. Your doctor may have made an opening in the area so that the pus can drain out. You may have gauze in the cut so that the area will stay open and keep draining. You may need antibiotics.  You will need to follow up with your doctor to make sure the infection has gone away.  Follow-up care is a key part of your treatment and safety. Be sure to make and  hours. Other good choices include dry toast, crackers, cooked cereal, and gelatin dessert, such as Jell-O.  Do not smoke. Smoking and being around smoke can make nausea worse. If you need help quitting, talk to your doctor about stop-smoking programs and medicines. These can increase your chances of quitting for good.  When should you call for help?   Call 911  anytime you think you may need emergency care. For example, call if:    You passed out (lost consciousness).   Call your doctor now or seek immediate medical care if:    You have new or worse nausea or vomiting.     You are too sick to your stomach to drink any fluids.     You cannot keep down fluids.     You have symptoms of dehydration, such as:  Dry eyes and a dry mouth.  Passing only a little urine.  Feeling thirstier than usual.     Your pain medicine is not helping.     You are dizzy or lightheaded, or you feel like you may faint.   Watch closely for changes in your health, and be sure to contact your doctor if:    You do not get better as expected.

## 2025-02-07 NOTE — ANESTHESIA POSTPROCEDURE EVALUATION
Department of Anesthesiology  Postprocedure Note    Patient: Per Stroud  MRN: [id number]  YOB: 1960  Date of evaluation: 2/7/2025    Procedure Summary       Date: 02/07/25 Room / Location: [address]    Anesthesia Start: 0629 Anesthesia Stop: 0718    Procedure: RIGHT ELBOW CUBITAL TUNNEL RELEASE-2/7/25 (Right: Elbow) Diagnosis:       Cubital tunnel syndrome on right      (Cubital tunnel syndrome on right [G56.21])    Surgeons: Dhaval Molina DO Responsible Provider: Regan Monroe DO    Anesthesia Type: General ASA Status: 3            Anesthesia Type: General    Nemesio Phase I: Nemesio Score: 10    Nemesio Phase II: Nemesio Score: 10    Anesthesia Post Evaluation    Patient location during evaluation: PACU  Patient participation: complete - patient participated  Level of consciousness: awake and alert  Pain score: 1  Airway patency: patent  Nausea & Vomiting: no nausea and no vomiting  Cardiovascular status: hemodynamically stable  Respiratory status: acceptable  Hydration status: euvolemic  Pain management: adequate    No notable events documented.

## 2025-02-07 NOTE — H&P
Updated H&P    Chief Complaint   Patient presents with    Elbow Pain       Patient is here for bilateral elbow pain. Patient states he had an EMG and was referred here to schedule surgery.         Per Stroud  male  presents today for evaluation of his b/l elbow pain L>R.  The patient states the pain started  2 years ago.  The pain is aching and numbness, tingling  in nature.  He has been having numbness from the elbow down into the entire hand.  It is worse with movement, lifting and better with rest.  The patient does complain of night pain.  The patient is right hand dominant.  The patient is not working at this time.      Past Medical History        Past Medical History:   Diagnosis Date    Acid reflux      GERD (gastroesophageal reflux disease)      Hyperlipidemia      Hypertension      Hypokalemia      Hypomagnesemia      Low back pain      Neuropathy       Years ago         Past Surgical History         Past Surgical History:   Procedure Laterality Date    ANESTHESIA NERVE BLOCK Right 01/14/2020     RIGHT L2,3,4 MEDIAL BRANCH BLOCK WITHOUT SEDATION (CPT 75012,91534) performed by Sean Cervantes MD at [facility] OR    ANESTHESIA NERVE BLOCK Right 02/20/2020     RIGHT L2,3,4 MEDIAL BRANCH BLOCK (CPT 99166,50556) performed by Sean Cervantes MD at [facility] OR    ANESTHESIA NERVE BLOCK Left 06/29/2020     LEFT L2 L3 L4 MEDIAL BRANCH BLOCK (CPT 84332) (WANTS AFTERNOON APPOINTMENT) performed by Sean Cervantes MD at [facility] OR    BACK SURGERY         May 4, 2018 decompression with Dr. Daily.     CARPAL TUNNEL RELEASE Left 01/24/2014    CARPAL TUNNEL RELEASE         left    CERVICAL FUSION   11/01/2021    COLONOSCOPY        FRACTURE SURGERY         arm    FRACTURE SURGERY         left arm    HERNIA REPAIR        HERNIA REPAIR         x2 child    KNEE ARTHROSCOPY Left 02/20/2015    NERVE BLOCK Right 01/14/2020    NERVE BLOCK Right 02/20/2020     L2,3,4 medial     NERVE BLOCK Right 05/04/2020      blood vessels and nerves, non relief of symptoms, continued pain, worsening of symptoms.

## 2025-02-07 NOTE — OP NOTE
Operative Note      Patient: Per Stroud  YOB: 1960  MRN: [id number]    Date of Procedure: 2/7/2025    Pre-Op Diagnosis Codes:      * Cubital tunnel syndrome on right [G56.21]    Post-Op Diagnosis: Same       Procedure(s):  RIGHT ELBOW CUBITAL TUNNEL RELEASE-2/7/25    Surgeon(s):  Dhaval Long DO    Assistant:   * No surgical staff found *    Anesthesia: General    Estimated Blood Loss (mL): less than 100     Complications: None    Specimens:   * No specimens in log *    Implants:  * No implants in log *      Drains: * No LDAs found *    Findings:  Infection Present At Time Of Surgery (PATOS) (choose all levels that have infection present):  No infection present  Other Findings: as above    Detailed Description of Procedure:   below      SURGEON: DHAVAL LONG D.O.   ASSISTANT: as above  PREOPERATIVE DIAGNOSIS: right  elbow cubital tunnel syndrome.   POSTOPERATIVE DIAGNOSIS:  rightelbow cubital tunnel syndrome.   PROCEDURE: right Cubital tunnel release.   ANESTHESIA: local  ESTIMATED BLOOD LOSS: Minimal.   COMPLICATIONS: None.   OPERATIVE PROCEDURE: The patient was brought to the operative suite and was   given a general anesthesia. The right arm was identified with a preoperative   time out, placed a tourniquet around the right arm, prepped and draped the arm   in a sterile fashion.   I outlined an incision along the medial elbow, along the area of the cubital   tunnel. I made an incision with a #15 blade through skin and subcutaneous   tissue. I dissected down to the level of the medial epicondyle and medial   elbow. I exposed the underlying nerve and felt it. I then dove down into the   cubital tunnel, right through Sanders ligament, released the ligament   proximally and distally to full extent, proximally to arcade of Brandy. I   released the complete extent of the Sanders ligament, dissected down through   into the flexor carpi ulnaris muscle belly and fascia. The nerve was

## 2025-02-13 ENCOUNTER — HOSPITAL ENCOUNTER (OUTPATIENT)
Dept: ULTRASOUND IMAGING | Age: 65
Discharge: HOME | End: 2025-02-15
Payer: MEDICARE

## 2025-02-13 DIAGNOSIS — R42 DIZZINESS AND GIDDINESS: ICD-10-CM

## 2025-02-13 PROCEDURE — 93880 EXTRACRANIAL BILAT STUDY: CPT

## 2025-02-19 ENCOUNTER — TELEPHONE (OUTPATIENT)
Age: 65
End: 2025-02-19

## 2025-02-19 DIAGNOSIS — G56.23 ULNAR NEUROPATHY OF BOTH UPPER EXTREMITIES: Primary | ICD-10-CM

## 2025-02-19 DIAGNOSIS — G60.3 IDIOPATHIC PROGRESSIVE NEUROPATHY: ICD-10-CM

## 2025-02-19 DIAGNOSIS — G56.23 ULNAR NEUROPATHY OF BOTH UPPER EXTREMITIES: ICD-10-CM

## 2025-02-19 LAB
BUN BLDV-MCNC: 22 MG/DL (ref 6–23)
CREAT SERPL-MCNC: 0.9 MG/DL (ref 0.7–1.2)
GFR, ESTIMATED: >90 ML/MIN/1.73M2

## 2025-02-20 ENCOUNTER — HOSPITAL ENCOUNTER (OUTPATIENT)
Dept: MRI IMAGING | Age: 65
Discharge: HOME | End: 2025-02-22
Attending: PSYCHIATRY & NEUROLOGY
Payer: MEDICARE

## 2025-02-20 DIAGNOSIS — G60.3 IDIOPATHIC PROGRESSIVE NEUROPATHY: ICD-10-CM

## 2025-02-20 DIAGNOSIS — G95.9 CERVICAL MYELOPATHY (HCC): ICD-10-CM

## 2025-02-20 DIAGNOSIS — R42 DIZZINESS: ICD-10-CM

## 2025-02-20 PROCEDURE — 70553 MRI BRAIN STEM W/O & W/DYE: CPT

## 2025-02-20 PROCEDURE — 72156 MRI NECK SPINE W/O & W/DYE: CPT

## 2025-02-20 PROCEDURE — A9579 GAD-BASE MR CONTRAST NOS,1ML: HCPCS | Performed by: RADIOLOGY

## 2025-02-20 PROCEDURE — 6360000004 HC RX CONTRAST MEDICATION: Performed by: RADIOLOGY

## 2025-02-20 RX ADMIN — GADOTERIDOL 20 ML: 279.3 INJECTION, SOLUTION INTRAVENOUS at 13:30

## 2025-02-24 ENCOUNTER — OFFICE VISIT (OUTPATIENT)
Dept: ORTHOPEDIC SURGERY | Age: 65
End: 2025-02-24

## 2025-02-24 VITALS — WEIGHT: 245 LBS | BODY MASS INDEX: 32.47 KG/M2 | HEIGHT: 73 IN | OXYGEN SATURATION: 98 % | RESPIRATION RATE: 18 BRPM

## 2025-02-24 DIAGNOSIS — Z98.890 S/P CUBITAL TUNNEL RELEASE: Primary | ICD-10-CM

## 2025-02-24 PROCEDURE — 99024 POSTOP FOLLOW-UP VISIT: CPT

## 2025-02-24 RX ORDER — HYDROCODONE BITARTRATE AND ACETAMINOPHEN 5; 325 MG/1; MG/1
1 TABLET ORAL EVERY 6 HOURS PRN
Qty: 28 TABLET | Refills: 0 | Status: SHIPPED | OUTPATIENT
Start: 2025-02-24 | End: 2025-03-03

## 2025-02-24 NOTE — PROGRESS NOTES
Per Leonarda Stroud is here for followup after right cubital tunnel surgery. Pain is controlled with current analgesics.  Medication(s) being used: Norco 5mg.. The patient notes improvement in the following symptoms:strength, numbness, pain, sensation.  The patient denies fever, wound drainage, increasing redness, pus, increasing pain, increasing swelling. Post op problems reported: none.         Objective:         General :    alert, appears stated age, and cooperative   Sutures:   Sutures out.   Incision:  healing well, no significant drainage, no dehiscence, no significant erythema   Tenderness:  none   Flexion ROM:  full range of motion   Extension ROM:  full range of motion   Effusion:  no         Assessment:        Status post right cubital tunnel surgery. Doing well postoperatively.        Plan:     Sutures removed today.  HEP  Follow up: prn  No heavy lifting, pushing, pulling for 3-4 weeks  Can get incision wet, do not submerge until closed  ROM elbow, wrist, hand

## 2025-02-28 ENCOUNTER — TREATMENT (OUTPATIENT)
Dept: PHYSICAL THERAPY | Age: 65
End: 2025-02-28

## 2025-02-28 DIAGNOSIS — G56.23 ULNAR NEUROPATHY OF BOTH UPPER EXTREMITIES: ICD-10-CM

## 2025-02-28 DIAGNOSIS — G95.9 CERVICAL MYELOPATHY (HCC): Primary | ICD-10-CM

## 2025-03-03 ENCOUNTER — OFFICE VISIT (OUTPATIENT)
Age: 65
End: 2025-03-03
Payer: MEDICARE

## 2025-03-03 VITALS
HEART RATE: 84 BPM | BODY MASS INDEX: 33.28 KG/M2 | SYSTOLIC BLOOD PRESSURE: 117 MMHG | DIASTOLIC BLOOD PRESSURE: 79 MMHG | WEIGHT: 251.1 LBS | HEIGHT: 73 IN

## 2025-03-03 DIAGNOSIS — G95.9 CERVICAL MYELOPATHY (HCC): ICD-10-CM

## 2025-03-03 DIAGNOSIS — G63 NEUROPATHY ASSOCIATED WITH MGUS: ICD-10-CM

## 2025-03-03 DIAGNOSIS — D47.2 NEUROPATHY ASSOCIATED WITH MGUS: ICD-10-CM

## 2025-03-03 DIAGNOSIS — G62.9 NEUROPATHY WITH IMPAIRED GLUCOSE TOLERANCE: ICD-10-CM

## 2025-03-03 DIAGNOSIS — R73.02 NEUROPATHY WITH IMPAIRED GLUCOSE TOLERANCE: ICD-10-CM

## 2025-03-03 DIAGNOSIS — M54.12 CERVICAL RADICULOPATHY: ICD-10-CM

## 2025-03-03 DIAGNOSIS — G56.23 ULNAR NEUROPATHY OF BOTH UPPER EXTREMITIES: Primary | ICD-10-CM

## 2025-03-03 PROCEDURE — G8427 DOCREV CUR MEDS BY ELIG CLIN: HCPCS | Performed by: PSYCHIATRY & NEUROLOGY

## 2025-03-03 PROCEDURE — 3078F DIAST BP <80 MM HG: CPT | Performed by: PSYCHIATRY & NEUROLOGY

## 2025-03-03 PROCEDURE — G8417 CALC BMI ABV UP PARAM F/U: HCPCS | Performed by: PSYCHIATRY & NEUROLOGY

## 2025-03-03 PROCEDURE — 3017F COLORECTAL CA SCREEN DOC REV: CPT | Performed by: PSYCHIATRY & NEUROLOGY

## 2025-03-03 PROCEDURE — 99214 OFFICE O/P EST MOD 30 MIN: CPT | Performed by: PSYCHIATRY & NEUROLOGY

## 2025-03-03 PROCEDURE — 1036F TOBACCO NON-USER: CPT | Performed by: PSYCHIATRY & NEUROLOGY

## 2025-03-03 PROCEDURE — 1159F MED LIST DOCD IN RCRD: CPT | Performed by: PSYCHIATRY & NEUROLOGY

## 2025-03-03 PROCEDURE — 1123F ACP DISCUSS/DSCN MKR DOCD: CPT | Performed by: PSYCHIATRY & NEUROLOGY

## 2025-03-03 PROCEDURE — 3074F SYST BP LT 130 MM HG: CPT | Performed by: PSYCHIATRY & NEUROLOGY

## 2025-03-03 RX ORDER — BACLOFEN 10 MG/1
10 TABLET ORAL 3 TIMES DAILY
Qty: 90 TABLET | Refills: 3 | Status: SHIPPED | OUTPATIENT
Start: 2025-03-03

## 2025-03-03 NOTE — PROGRESS NOTES
Carol Romero MD       Per Stroud is a 65 y.o. male presenting as a follow patient for a   Chief Complaint   Patient presents with    Follow-up    ULNAR NEUROPATHY    Back Pain    MYELOPATHY      Chronicity: neuropathy symptoms for 7 + years.  Affected his arms.      Had cervical spine decompression in 2021  Affected left > right arm, numbness > pain  s/p cervical spine decompression and fusion in   Dr. Daily     Had laminectomy in lumbar area in 2017     Had right ulnar nerve surgery in feb 7th 2025  Had cubital tunnel release surgery  Right hand strength improved   Can open bottles, writing is better       Had mri's done in between  Strength in shoulder in stable, may be slightly better on left    Seen Dr. Cervantes, and had 1 inj in right l4/5 transforaminal epidural inj.in dec 2024  Relief in right leg better 80% better.   Localized pain in back        Mri c spine: 2024:   IMPRESSION:  1. Status post ACDF from C4-C6 with corpectomy changes at C5.  2. Redemonstration of myelomalacia from C3-4 to C6.  3. Moderate central canal stenosis at C3-4. Mild to moderate central canal  stenosis at C4-5 and C5-6. Mild central canal stenosis at C6-7.  4. Multilevel neural foraminal stenoses, worst (severe) at bilateral C3-4,  bilateral C4-5, bilateral C5-6 and bilateral C6-7.  5. Enhancement along the supraspinous ligaments from the level of C6 into the  partially visualized thoracic spine is of an unclear etiology.  It may be the  result of injury, degenerative changes or an infectious/inflammatory process.         Mri brain: nml      Mri lumbar spine:  2024:   IMPRESSION:     1. At L4-5, there is a progressing right foraminal disc herniation and/or   epidural fibrosis compressing the exiting L4 nerve. Correlation with   contrast-enhanced MRI study may be helpful as clinically warranted. The   central canal remains well decompressed. There is persistent moderate to   severe left foraminal stenosis.     2.

## 2025-03-04 ENCOUNTER — TREATMENT (OUTPATIENT)
Dept: PHYSICAL THERAPY | Age: 65
End: 2025-03-04

## 2025-03-04 DIAGNOSIS — G56.23 ULNAR NEUROPATHY OF BOTH UPPER EXTREMITIES: ICD-10-CM

## 2025-03-04 DIAGNOSIS — G95.9 CERVICAL MYELOPATHY (HCC): Primary | ICD-10-CM

## 2025-03-04 NOTE — PROGRESS NOTES
Physical Therapy Daily Treatment Note    Date: 3/4/2025  Patient Name: Per Stroud  : 1960   MRN: [id number]  DOInjury: -  DOSx: -  Referring Provider:   Carol Romero MD   Medical Diagnosis:   G95.9 (ICD-10-CM) - Cervical myelopathy (HCC)  G56.23 (ICD-10-CM) - Ulnar neuropathy of both upper extremities    Outcome Measure:  QuickDASH 72% disability      X = TO BE PERFORMED NEXT VISIT  > = PROGRESS TO THIS FIRST  >> = PROGRESS TO THIS SECOND  >>> = PROGRESS TO THIS THIRD    S: See eval  O: Discussed anatomy, physiology, body mechanics, principles of loading, and progressive loading/activity.  Reviewed home exercise program extensively; written copy provided.     Access Code: [email or web address]  URL: [email or web address]  Date: 2025  Prepared by: Heriberto Alexander    Exercises  - Standing Marching  - 2 x daily - 7 x weekly - 2 sets - 15 reps  - Standing Heel Raise  - 2 x daily - 7 x weekly - 2 sets - 15 reps  - Side Kicks  - 2 x daily - 7 x weekly - 2 sets - 15 reps  - Seated Overhead Press with Bar  - 2 x daily - 7 x weekly - 3 sets - 15 reps  - Sit to Stand Without Arm Support  - 2 x daily - 7 x weekly - 2 sets - 10 reps    Time 6464-6405     Visit 2 Repeat outcome measure at mid point and end.    Pain      ROM      Modalities            Exercise      Nustep   15 min To increase work capacity TA         Squats   TA   Calf Raises   TA   Toe Raises   TA   Marches 2 x 15  TA   Alt. Sidekicks 2 x 15  TA         Sit/Stands 2 x 10  TA         Gait training   GT         Marching Gait   NR   Sidestepping   NR   Wand flex 3 x 15  TE                           A:  Tolerated well.    P: Continue with rehab plan  Heriberto Alexander PT    Treatment Charges: Mins Units   Initial Evaluation     Re-Evaluation     Ther Exercise         TE 10 1   Manual Therapy     MT     Ther Activities        TA 30 2   Gait Training          GT     Neuro Re-education NR     Modalities     Non-Billable Service Time     Other     Total

## 2025-03-07 ENCOUNTER — TELEPHONE (OUTPATIENT)
Dept: ONCOLOGY | Age: 65
End: 2025-03-07

## 2025-03-07 ENCOUNTER — OFFICE VISIT (OUTPATIENT)
Dept: PAIN MANAGEMENT | Age: 65
End: 2025-03-07
Payer: MEDICARE

## 2025-03-07 VITALS
RESPIRATION RATE: 18 BRPM | WEIGHT: 251 LBS | SYSTOLIC BLOOD PRESSURE: 90 MMHG | BODY MASS INDEX: 33.27 KG/M2 | OXYGEN SATURATION: 93 % | TEMPERATURE: 96.9 F | HEIGHT: 73 IN | HEART RATE: 83 BPM | DIASTOLIC BLOOD PRESSURE: 60 MMHG

## 2025-03-07 DIAGNOSIS — M54.16 LUMBAR RADICULOPATHY: ICD-10-CM

## 2025-03-07 DIAGNOSIS — M51.9 LUMBAR DISC DISORDER: ICD-10-CM

## 2025-03-07 DIAGNOSIS — M47.816 LUMBAR FACET ARTHROPATHY: Primary | ICD-10-CM

## 2025-03-07 DIAGNOSIS — M47.812 CERVICAL FACET JOINT SYNDROME: ICD-10-CM

## 2025-03-07 DIAGNOSIS — M47.816 LUMBAR SPONDYLOSIS: ICD-10-CM

## 2025-03-07 DIAGNOSIS — M50.90 CERVICAL DISC DISORDER: ICD-10-CM

## 2025-03-07 DIAGNOSIS — M47.812 CERVICAL SPONDYLOSIS: ICD-10-CM

## 2025-03-07 DIAGNOSIS — M96.1 LUMBAR POSTLAMINECTOMY SYNDROME: ICD-10-CM

## 2025-03-07 DIAGNOSIS — M53.3 DISORDER OF SACRUM: ICD-10-CM

## 2025-03-07 PROCEDURE — G8417 CALC BMI ABV UP PARAM F/U: HCPCS | Performed by: PAIN MEDICINE

## 2025-03-07 PROCEDURE — 3017F COLORECTAL CA SCREEN DOC REV: CPT | Performed by: PAIN MEDICINE

## 2025-03-07 PROCEDURE — 3074F SYST BP LT 130 MM HG: CPT | Performed by: PAIN MEDICINE

## 2025-03-07 PROCEDURE — 1160F RVW MEDS BY RX/DR IN RCRD: CPT | Performed by: PAIN MEDICINE

## 2025-03-07 PROCEDURE — G8427 DOCREV CUR MEDS BY ELIG CLIN: HCPCS | Performed by: PAIN MEDICINE

## 2025-03-07 PROCEDURE — 99214 OFFICE O/P EST MOD 30 MIN: CPT | Performed by: PAIN MEDICINE

## 2025-03-07 PROCEDURE — 1123F ACP DISCUSS/DSCN MKR DOCD: CPT | Performed by: PAIN MEDICINE

## 2025-03-07 PROCEDURE — 3078F DIAST BP <80 MM HG: CPT | Performed by: PAIN MEDICINE

## 2025-03-07 PROCEDURE — 1159F MED LIST DOCD IN RCRD: CPT | Performed by: PAIN MEDICINE

## 2025-03-07 PROCEDURE — 1036F TOBACCO NON-USER: CPT | Performed by: PAIN MEDICINE

## 2025-03-07 RX ORDER — TERBINAFINE HYDROCHLORIDE 250 MG/1
250 TABLET ORAL DAILY
COMMUNITY
Start: 2025-02-14

## 2025-03-07 RX ORDER — SODIUM CHLORIDE 9 MG/ML
INJECTION, SOLUTION INTRAVENOUS PRN
OUTPATIENT
Start: 2025-03-07

## 2025-03-07 RX ORDER — LAMOTRIGINE 25 MG/1
25 TABLET ORAL DAILY
COMMUNITY
Start: 2025-02-12

## 2025-03-07 RX ORDER — SODIUM CHLORIDE 0.9 % (FLUSH) 0.9 %
5-40 SYRINGE (ML) INJECTION EVERY 12 HOURS SCHEDULED
OUTPATIENT
Start: 2025-03-07

## 2025-03-07 RX ORDER — PREGABALIN 75 MG/1
75 CAPSULE ORAL 2 TIMES DAILY
Qty: 60 CAPSULE | Refills: 1 | Status: SHIPPED | OUTPATIENT
Start: 2025-03-07 | End: 2025-05-06

## 2025-03-07 RX ORDER — SODIUM CHLORIDE 0.9 % (FLUSH) 0.9 %
5-40 SYRINGE (ML) INJECTION PRN
OUTPATIENT
Start: 2025-03-07

## 2025-03-07 NOTE — PROGRESS NOTES
Per Stroud presents to the [facility] on 3/7/2025. Per is complaining of pain lower back. The pain is persistent. The pain is described as aching and throbbing. Pain is rated on his best day at a 5, on his worst day at a 8, and on average at a 7 on the VAS scale. He took his last dose of Lyrica, Motrin, and Baclofen  today.      Any procedures since your last visit: No  He is  on NSAIDS and  is not on anticoagulation medications t  Pacemaker or defibrillator: No   Medication Contract and Consent for Opioid Use Documents Filed        No documents found                       Resp 18   Ht 1.854 m (6' 1\")   Wt 113.9 kg (251 lb)   BMI 33.12 kg/m²      No LMP for male patient.

## 2025-03-07 NOTE — PROGRESS NOTES
[facility]  [address], [place] [address]  [phone]    Follow up Note      Per Waynejose     Date of Visit:  3/7/2025    CC:  Patient presents for follow up   Chief Complaint   Patient presents with    Back Pain       HPI:  Right lower extremity pain is better/axial low back pain is unchanged.  Pain is described as aching/constant and is aggravated by twisting and standing.  Appropriate analgesia with current medications regimen: yes fair.    Change in quality of symptoms:no.    Medication side effects:none  Recent diagnostic testing:Cervical spine MRI  Recent interventional procedures: none    He has not been on anticoagulation medications to include none. The patient  has not been on herbal supplements.  The patient is not diabetic.     Imaging:   Cervical spine MRI 2025  1. Status post ACDF from C4-C6 with corpectomy changes at C5.  2. Redemonstration of myelomalacia from C3-4 to C6.  3. Moderate central canal stenosis at C3-4. Mild to moderate central canal  stenosis at C4-5 and C5-6. Mild central canal stenosis at C6-7.  4. Multilevel neural foraminal stenoses, worst (severe) at bilateral C3-4,  bilateral C4-5, bilateral C5-6 and bilateral C6-7.  5. Enhancement along the supraspinous ligaments from the level of C6 into the  partially visualized thoracic spine is of an unclear etiology.  It may be the  result of injury, degenerative changes or an infectious/inflammatory process.    Lumbar spine MRI 2024  T12-L1: The previously described cystic space occupying lesion in the   left lateral epidural space has essentially resolved. There is persistent   mild right and mild to moderate left facet hypertrophy. Previously noted   canal stenosis as essentially resolved. There is a persistent disc bulge   extending into the inferior neural foramina. There is stable mild to   moderate left-sided facet hypertrophy. There is persistent moderate right   and mild to

## 2025-03-12 ENCOUNTER — TREATMENT (OUTPATIENT)
Dept: PHYSICAL THERAPY | Age: 65
End: 2025-03-12
Payer: MEDICARE

## 2025-03-12 DIAGNOSIS — G95.9 CERVICAL MYELOPATHY (HCC): Primary | ICD-10-CM

## 2025-03-12 DIAGNOSIS — G56.23 ULNAR NEUROPATHY OF BOTH UPPER EXTREMITIES: ICD-10-CM

## 2025-03-12 PROCEDURE — 97110 THERAPEUTIC EXERCISES: CPT | Performed by: PHYSICAL THERAPIST

## 2025-03-12 PROCEDURE — 97530 THERAPEUTIC ACTIVITIES: CPT | Performed by: PHYSICAL THERAPIST

## 2025-03-12 NOTE — PROGRESS NOTES
Physical Therapy Daily Treatment Note    Date: 3/12/2025  Patient Name: Per Stroud  : 1960   MRN: [id number]  DOInjury: -  DOSx: -  Referring Provider:   Carol Romero MD   Medical Diagnosis:   G95.9 (ICD-10-CM) - Cervical myelopathy (HCC)  G56.23 (ICD-10-CM) - Ulnar neuropathy of both upper extremities    Outcome Measure:  QuickDASH 72% disability      X = TO BE PERFORMED NEXT VISIT  > = PROGRESS TO THIS FIRST  >> = PROGRESS TO THIS SECOND  >>> = PROGRESS TO THIS THIRD    S: See eval  O: Discussed anatomy, physiology, body mechanics, principles of loading, and progressive loading/activity.  Reviewed home exercise program extensively; written copy provided.     Access Code: [email or web address]  URL: [email or web address]  Date: 2025  Prepared by: Heriberto Alexander    Exercises  - Standing Marching  - 2 x daily - 7 x weekly - 2 sets - 15 reps  - Standing Heel Raise  - 2 x daily - 7 x weekly - 2 sets - 15 reps  - Side Kicks  - 2 x daily - 7 x weekly - 2 sets - 15 reps  - Seated Overhead Press with Bar  - 2 x daily - 7 x weekly - 3 sets - 15 reps  - Sit to Stand Without Arm Support  - 2 x daily - 7 x weekly - 2 sets - 10 reps    Time 1213-8696     Visit 2 Repeat outcome measure at mid point and end.    Pain      ROM      Modalities            Exercise      Nustep   15 min To increase work capacity TA         Squats   TA   Calf Raises 2 x 15  TA   Toe Raises   TA   Marches 2 x 10  TA   Alt. Sidekicks 2 x 15  TA         Sit/Stands 3 x 10  TA         Gait training   GT         Marching Gait   NR   Sidestepping   NR   Wand flex 3 x 15  TE                           A:  Tolerated well.    P: Continue with rehab plan  Heriberto Alexander PT    Treatment Charges: Mins Units   Initial Evaluation     Re-Evaluation     Ther Exercise         TE 10 1   Manual Therapy     MT     Ther Activities        TA 30 2   Gait Training          GT     Neuro Re-education NR     Modalities     Non-Billable Service Time     Other     Total

## 2025-03-14 ENCOUNTER — TREATMENT (OUTPATIENT)
Dept: PHYSICAL THERAPY | Age: 65
End: 2025-03-14
Payer: MEDICARE

## 2025-03-14 ENCOUNTER — TELEPHONE (OUTPATIENT)
Dept: PAIN MANAGEMENT | Age: 65
End: 2025-03-14

## 2025-03-14 DIAGNOSIS — G95.9 CERVICAL MYELOPATHY (HCC): Primary | ICD-10-CM

## 2025-03-14 DIAGNOSIS — G56.23 ULNAR NEUROPATHY OF BOTH UPPER EXTREMITIES: ICD-10-CM

## 2025-03-14 PROCEDURE — 97110 THERAPEUTIC EXERCISES: CPT | Performed by: PHYSICAL THERAPIST

## 2025-03-14 PROCEDURE — 97530 THERAPEUTIC ACTIVITIES: CPT | Performed by: PHYSICAL THERAPIST

## 2025-03-14 NOTE — PROGRESS NOTES
Physical Therapy Daily Treatment Note    Date: 3/14/2025  Patient Name: Per Stroud  : 1960   MRN: [id number]  DOInjury: -  DOSx: -  Referring Provider:   Carol Romero MD   Medical Diagnosis:   G95.9 (ICD-10-CM) - Cervical myelopathy (HCC)  G56.23 (ICD-10-CM) - Ulnar neuropathy of both upper extremities    Outcome Measure:  QuickDASH 72% disability      X = TO BE PERFORMED NEXT VISIT  > = PROGRESS TO THIS FIRST  >> = PROGRESS TO THIS SECOND  >>> = PROGRESS TO THIS THIRD    S: See eval  O: Discussed anatomy, physiology, body mechanics, principles of loading, and progressive loading/activity.  Reviewed home exercise program extensively; written copy provided.     Access Code: [email or web address]  URL: [email or web address]  Date: 2025  Prepared by: Heriberto Alexander    Exercises  - Standing Marching  - 2 x daily - 7 x weekly - 2 sets - 15 reps  - Standing Heel Raise  - 2 x daily - 7 x weekly - 2 sets - 15 reps  - Side Kicks  - 2 x daily - 7 x weekly - 2 sets - 15 reps  - Seated Overhead Press with Bar  - 2 x daily - 7 x weekly - 3 sets - 15 reps  - Sit to Stand Without Arm Support  - 2 x daily - 7 x weekly - 2 sets - 10 reps    Time 1905-3385     Visit 2 Repeat outcome measure at mid point and end.    Pain      ROM      Modalities            Exercise      Nustep   15 min To increase work capacity TA         Squats   TA   Calf Raises 2 x 15  TA   Toe Raises   TA   Marches 2 x 10  TA   Alt. Sidekicks 2 x 15  TA         Sit/Stands 3 x 10  TA         Gait training   GT         Marching Gait   NR   Sidestepping   NR   Wand flex 3 x 15  TE                           A:  Tolerated well.    P: Continue with rehab plan  Heriberto Alexander PT    Treatment Charges: Mins Units   Initial Evaluation     Re-Evaluation     Ther Exercise         TE 10 1   Manual Therapy     MT     Ther Activities        TA 30 2   Gait Training          GT     Neuro Re-education NR     Modalities     Non-Billable Service Time     Other     Total

## 2025-03-14 NOTE — TELEPHONE ENCOUNTER
Call to Per Stroud that procedure was scheduled for 03/24/2025 and that [facility] should call him a few days before for the pre op call and after 3:00 PM the business day before with the arrival time. Instructed to call office back if any questions. Per verbalized understanding.    Electronically signed by Michaela Bryson RN on 3/14/2025 at 5:34 PM

## 2025-03-17 ENCOUNTER — PREP FOR PROCEDURE (OUTPATIENT)
Dept: ORTHOPEDIC SURGERY | Age: 65
End: 2025-03-17

## 2025-03-19 ENCOUNTER — TREATMENT (OUTPATIENT)
Dept: PHYSICAL THERAPY | Age: 65
End: 2025-03-19
Payer: MEDICARE

## 2025-03-19 DIAGNOSIS — G95.9 CERVICAL MYELOPATHY (HCC): Primary | ICD-10-CM

## 2025-03-19 DIAGNOSIS — G56.23 ULNAR NEUROPATHY OF BOTH UPPER EXTREMITIES: ICD-10-CM

## 2025-03-19 PROCEDURE — 97530 THERAPEUTIC ACTIVITIES: CPT

## 2025-03-19 PROCEDURE — 97110 THERAPEUTIC EXERCISES: CPT

## 2025-03-19 NOTE — PROGRESS NOTES
Physical Therapy Daily Treatment Note    Date: 3/19/2025  Patient Name: Per Stroud  : 1960   MRN: [id number]  DOInjury: -  DOSx: -  Referring Provider:   Carol Romero MD   Medical Diagnosis:   G95.9 (ICD-10-CM) - Cervical myelopathy (HCC)  G56.23 (ICD-10-CM) - Ulnar neuropathy of both upper extremities    Outcome Measure:  QuickDASH 72% disability      X = TO BE PERFORMED NEXT VISIT  > = PROGRESS TO THIS FIRST  >> = PROGRESS TO THIS SECOND  >>> = PROGRESS TO THIS THIRD    S: See eval  O: Discussed anatomy, physiology, body mechanics, principles of loading, and progressive loading/activity.  Reviewed home exercise program extensively; written copy provided.     Access Code: [email or web address]  URL: [email or web address]  Date: 2025  Prepared by: Heriberto Alexander    Exercises  - Standing Marching  - 2 x daily - 7 x weekly - 2 sets - 15 reps  - Standing Heel Raise  - 2 x daily - 7 x weekly - 2 sets - 15 reps  - Side Kicks  - 2 x daily - 7 x weekly - 2 sets - 15 reps  - Seated Overhead Press with Bar  - 2 x daily - 7 x weekly - 3 sets - 15 reps  - Sit to Stand Without Arm Support  - 2 x daily - 7 x weekly - 2 sets - 10 reps    Time 3582-4443     Visit 6/  Repeat outcome measure at mid point and end.    Pain      ROM      Modalities            Exercise      Nustep   15 min To increase work capacity TA         Squats  TA   Calf Raises 2 x 15  TA   Toe Raises   TA   Marches 2 x 10  TA   Alt. Sidekicks 2 x 15  TA         Sit/Stands 3 x 10  TA         Gait training   GT         Marching Gait   NR   Sidestepping   NR   Wand flex 3 x 10  TE                           A:  Tolerated well.    P: Continue with rehab plan  Ricco Blandon PTA    Treatment Charges: Mins Units   Initial Evaluation     Re-Evaluation     Ther Exercise         TE 10 1   Manual Therapy     MT     Ther Activities        TA 20 1   Gait Training          GT     Neuro Re-education NR     Modalities     Non-Billable Service Time 10 0   Other

## 2025-03-20 RX ORDER — ROSUVASTATIN CALCIUM 5 MG/1
5 TABLET, COATED ORAL DAILY
COMMUNITY
Start: 2025-03-17

## 2025-03-20 RX ORDER — FUROSEMIDE 20 MG/1
20 TABLET ORAL DAILY
COMMUNITY
Start: 2025-03-19

## 2025-03-21 ENCOUNTER — TREATMENT (OUTPATIENT)
Dept: PHYSICAL THERAPY | Age: 65
End: 2025-03-21

## 2025-03-21 DIAGNOSIS — G56.23 ULNAR NEUROPATHY OF BOTH UPPER EXTREMITIES: ICD-10-CM

## 2025-03-21 DIAGNOSIS — G95.9 CERVICAL MYELOPATHY (HCC): Primary | ICD-10-CM

## 2025-03-24 ENCOUNTER — HOSPITAL ENCOUNTER (OUTPATIENT)
Dept: OPERATING ROOM | Age: 65
Setting detail: OUTPATIENT SURGERY
Discharge: HOME | End: 2025-03-24
Attending: PAIN MEDICINE
Payer: MEDICARE

## 2025-03-24 ENCOUNTER — HOSPITAL ENCOUNTER (OUTPATIENT)
Age: 65
Setting detail: OUTPATIENT SURGERY
Discharge: HOME | End: 2025-03-24
Attending: PAIN MEDICINE | Admitting: PAIN MEDICINE
Payer: MEDICARE

## 2025-03-24 VITALS
WEIGHT: 251 LBS | OXYGEN SATURATION: 94 % | HEIGHT: 73 IN | TEMPERATURE: 97 F | RESPIRATION RATE: 16 BRPM | SYSTOLIC BLOOD PRESSURE: 117 MMHG | DIASTOLIC BLOOD PRESSURE: 71 MMHG | HEART RATE: 76 BPM | BODY MASS INDEX: 33.27 KG/M2

## 2025-03-24 DIAGNOSIS — M47.816 LUMBAR SPONDYLOSIS: ICD-10-CM

## 2025-03-24 PROCEDURE — 2709999900 HC NON-CHARGEABLE SUPPLY: Performed by: PAIN MEDICINE

## 2025-03-24 PROCEDURE — 7100000011 HC PHASE II RECOVERY - ADDTL 15 MIN: Performed by: PAIN MEDICINE

## 2025-03-24 PROCEDURE — 3600000005 HC SURGERY LEVEL 5 BASE: Performed by: PAIN MEDICINE

## 2025-03-24 PROCEDURE — 64494 INJ PARAVERT F JNT L/S 2 LEV: CPT | Performed by: PAIN MEDICINE

## 2025-03-24 PROCEDURE — 64493 INJ PARAVERT F JNT L/S 1 LEV: CPT | Performed by: PAIN MEDICINE

## 2025-03-24 PROCEDURE — 7100000010 HC PHASE II RECOVERY - FIRST 15 MIN: Performed by: PAIN MEDICINE

## 2025-03-24 PROCEDURE — 3600000015 HC SURGERY LEVEL 5 ADDTL 15MIN: Performed by: PAIN MEDICINE

## 2025-03-24 PROCEDURE — 6360000002 HC RX W HCPCS: Performed by: PAIN MEDICINE

## 2025-03-24 RX ORDER — LIDOCAINE HYDROCHLORIDE 5 MG/ML
INJECTION, SOLUTION INFILTRATION; INTRAVENOUS PRN
Status: DISCONTINUED | OUTPATIENT
Start: 2025-03-24 | End: 2025-03-24 | Stop reason: ALTCHOICE

## 2025-03-24 RX ORDER — SODIUM CHLORIDE 0.9 % (FLUSH) 0.9 %
5-40 SYRINGE (ML) INJECTION EVERY 12 HOURS SCHEDULED
Status: DISCONTINUED | OUTPATIENT
Start: 2025-03-24 | End: 2025-03-24 | Stop reason: HOSPADM

## 2025-03-24 RX ORDER — SODIUM CHLORIDE 0.9 % (FLUSH) 0.9 %
5-40 SYRINGE (ML) INJECTION PRN
Status: DISCONTINUED | OUTPATIENT
Start: 2025-03-24 | End: 2025-03-24 | Stop reason: HOSPADM

## 2025-03-24 RX ORDER — SODIUM CHLORIDE 9 MG/ML
INJECTION, SOLUTION INTRAVENOUS PRN
Status: DISCONTINUED | OUTPATIENT
Start: 2025-03-24 | End: 2025-03-24 | Stop reason: HOSPADM

## 2025-03-24 ASSESSMENT — PAIN - FUNCTIONAL ASSESSMENT
PAIN_FUNCTIONAL_ASSESSMENT: 0-10
PAIN_FUNCTIONAL_ASSESSMENT: 0-10

## 2025-03-24 NOTE — H&P
tablet by mouth daily 18  Yes Roscoe Ornelas DO   Lansoprazole (PREVACID PO) Take 30 mg by mouth daily AM   Yes ProviderSusan MD   ezetimibe (ZETIA) 10 MG tablet Take 1 tablet by mouth daily   Yes Susan Morataya MD   fenofibrate (TRICOR) 145 MG tablet Take 1 tablet by mouth daily 14  Yes ProviderSusan MD       No Known Allergies    Social History     Socioeconomic History    Marital status:      Spouse name: Not on file    Number of children: Not on file    Years of education: Not on file    Highest education level: Not on file   Occupational History    Not on file   Tobacco Use    Smoking status: Former     Current packs/day: 0.00     Average packs/day: 1 pack/day for 25.0 years (25.0 ttl pk-yrs)     Types: Cigarettes     Start date: 1992     Quit date: 2017     Years since quittin.0    Smokeless tobacco: Never    Tobacco comments:     7+ years amoking/ nicotine free   Vaping Use    Vaping status: Never Used   Substance and Sexual Activity    Alcohol use: Yes     Alcohol/week: 1.0 standard drink of alcohol     Types: 1 Drinks containing 0.5 oz of alcohol per week     Comment: Very rarely    Drug use: No    Sexual activity: Yes     Partners: Female   Other Topics Concern    Not on file   Social History Narrative    ** Merged History Encounter **          Social Drivers of Health     Financial Resource Strain: Not on file   Food Insecurity: Not on file   Transportation Needs: Not on file   Physical Activity: Unknown (2023)    Exercise Vital Sign     Days of Exercise per Week: 0 days     Minutes of Exercise per Session: Patient declined   Stress: Not on file   Social Connections: Not on file   Intimate Partner Violence: Not At Risk (2023)    Humiliation, Afraid, Rape, and Kick questionnaire     Fear of Current or Ex-Partner: No     Emotionally Abused: No     Physically Abused: No     Sexually Abused: No   Housing Stability: Not on file       Family

## 2025-03-24 NOTE — DISCHARGE INSTRUCTIONS
[facility]  [phone]   Post-Pain Block/ Radiofrequency Home Going Instructions    1-Go home, rest for the remainder of the day    2-Please do not lift over 20 pounds the day of the injection    3-If you received sedation No: alcohol, driving, operating lawn mowers, plows, tractors or other dangerous equipment until next morning. Do not make important decisions or sign legal documents for 24 hours. You may experience light headedness, dizziness, nausea or sleepiness after sedation. Do not stay alone. A responsible adult must be with you for 24 hours. You could be nauseated from the medications you have received. Your IV site may be sore and bruised.    4-No dietary restrictions     5-Resume all medications the same day, blood thinners to be resumed 24 hours after injection    6-Keep the surgical site clean and dry, you may shower the next morning and remove the      dressing.     7- No sitz baths, tub baths or hot tubs/swimming for 24 hours.       8- If you have any pain at the injection site(s), application of an ice pack to the area should be       helpful, 20 minutes on/20 minutes off for next 48 hours.    9- Call [facility] pain management immediately at if you develop.  Fever greater than 100.4 F  Have bleeding or drainage from the puncture site  Have progressive Leg/arm numbness and or weakness  Loss of control of bowel and or bladder (wet/soil yourself)  Severe headache with inability to lift head    10-You may return to work the next day           Infection After Surgery: Care Instructions  Overview  After surgery, an infection is always possible. It doesn't mean that the surgery didn't go well.  Because an infection can be serious, your doctor has taken steps to manage it.  Your doctor checked the infection and cleaned it if necessary. Your doctor may have made an opening in the area so that the pus can drain out. You may have gauze in the cut so that the area will stay open and

## 2025-03-24 NOTE — OP NOTE
3/24/2025    Patient: Per Stroud  :  1960  Age:  65 y.o.  Sex:  male     PRE-OPERATIVE DIAGNOSIS: Bilateral Lumbar spondylosis, lumbar facet syndrome.    POST-OPERATIVE DIAGNOSIS: Same.    PROCEDURE:  # 1 Fluoroscopic guided lumbar medial branch blocks Bilateral at Levels: L2,3,4 MBB    SURGEON: Sandy LLAMAS    ANESTHESIA: Local    ESTIMATED BLOOD LOSS: None.  ______________________________________________________________________  BRIEF HISTORY:  Per Stroud comes in today for 1 fluoroscopic guided lumbar medial branch blocks  Bilateral  at Levels: L2,3,4 MB.  The potential complications of this procedure were discussed with him again today. He has elected to undergo the aforementioned procedure.     Per’s complete History & Physical examination were reviewed in depth, a copy of which is in the chart.    DESCRIPTION OF PROCEDURE:   After confirming written and informed consent, a time-out was performed and Per’s name and date of birth, the procedure to be performed as well as the plan for the location of the needle insertion were confirmed.    The patient was brought into the procedure room and placed in the prone position on the fluoroscopy table. Standard monitors were placed and vital signs were observed throughout the procedure. The area of the lumbar spine was prepped with chloraprep and draped in a sterile manner. AP fluoroscopy was used to identify and jeet bartons point at the targeted levels.The skin and subcutaneous tissues in these identified areas were anesthetized with 0.5%Lidocaine. A 22 # gauge 3 1/2 spinal needle was advanced toward the junction of the superior articular process and the transverse process, along the course of the medial branch. Satisfactory needle placement was confirmed by AP and oblique projections.  At the sacral alar level, the sacral alar region was visualized and the needle tip was positioned on the sacral alar at the base of the superior

## 2025-03-26 ENCOUNTER — TREATMENT (OUTPATIENT)
Dept: PHYSICAL THERAPY | Age: 65
End: 2025-03-26
Payer: MEDICARE

## 2025-03-26 DIAGNOSIS — G95.9 CERVICAL MYELOPATHY (HCC): Primary | ICD-10-CM

## 2025-03-26 DIAGNOSIS — G56.23 ULNAR NEUROPATHY OF BOTH UPPER EXTREMITIES: ICD-10-CM

## 2025-03-26 PROCEDURE — 97110 THERAPEUTIC EXERCISES: CPT

## 2025-03-26 PROCEDURE — 97530 THERAPEUTIC ACTIVITIES: CPT

## 2025-03-26 NOTE — PROGRESS NOTES
Physical Therapy Daily Treatment Note    Date: 3/26/2025  Patient Name: Per Stroud  : 1960   MRN: [id number]  DOInjury: -  DOSx: -  Referring Provider:   Carol Romero MD   Medical Diagnosis:   G95.9 (ICD-10-CM) - Cervical myelopathy (HCC)  G56.23 (ICD-10-CM) - Ulnar neuropathy of both upper extremities    Outcome Measure:  QuickDASH 72% disability      X = TO BE PERFORMED NEXT VISIT  > = PROGRESS TO THIS FIRST  >> = PROGRESS TO THIS SECOND  >>> = PROGRESS TO THIS THIRD    S: good days and bad.  O: Discussed anatomy, physiology, body mechanics, principles of loading, and progressive loading/activity.  Reviewed home exercise program extensively; written copy provided.     Access Code: [email or web address]  URL: [email or web address]  Date: 2025  Prepared by: Heriberto Alexander    Exercises  - Standing Marching  - 2 x daily - 7 x weekly - 2 sets - 15 reps  - Standing Heel Raise  - 2 x daily - 7 x weekly - 2 sets - 15 reps  - Side Kicks  - 2 x daily - 7 x weekly - 2 sets - 15 reps  - Seated Overhead Press with Bar  - 2 x daily - 7 x weekly - 3 sets - 15 reps  - Sit to Stand Without Arm Support  - 2 x daily - 7 x weekly - 2 sets - 10 reps    Time 7091-7979     Visit 10+1 approved 10 visits  56686, 13106, 45914, 29399  2025 through 2025     Pain      ROM      Modalities            Exercise      Nustep   15 min To increase work capacity TA   pulleys 3 min     Squats  TA   Calf Raises 2 x 10  TA   Toe Raises   TA   Marches 2 x 10  TA   Alt. Sidekicks 2 x 10  TA         Sit/Stands 3 x 10  TA         Gait training   GT         Marching Gait   NR   Sidestepping   NR   Wand flex 3 x 10  TE                           A:  Tolerated well.    P:   discharge with pt to continue HEP  Viktoria Bass, PTA    Treatment Charges: Mins Units   Initial Evaluation     Re-Evaluation     Ther Exercise         TE 20 1   Manual Therapy     MT     Ther Activities        TA 30 2   Gait Training          GT     Neuro

## 2025-03-27 ENCOUNTER — RESULTS FOLLOW-UP (OUTPATIENT)
Age: 65
End: 2025-03-27

## 2025-03-28 ENCOUNTER — TELEPHONE (OUTPATIENT)
Dept: PHYSICAL THERAPY | Age: 65
End: 2025-03-28

## 2025-04-02 ENCOUNTER — OFFICE VISIT (OUTPATIENT)
Dept: PAIN MANAGEMENT | Age: 65
End: 2025-04-02
Payer: MEDICARE

## 2025-04-02 VITALS
OXYGEN SATURATION: 94 % | RESPIRATION RATE: 18 BRPM | WEIGHT: 251 LBS | DIASTOLIC BLOOD PRESSURE: 71 MMHG | BODY MASS INDEX: 33.27 KG/M2 | SYSTOLIC BLOOD PRESSURE: 107 MMHG | HEART RATE: 77 BPM | TEMPERATURE: 96.9 F | HEIGHT: 73 IN

## 2025-04-02 DIAGNOSIS — M96.1 LUMBAR POSTLAMINECTOMY SYNDROME: ICD-10-CM

## 2025-04-02 DIAGNOSIS — M53.3 DISORDER OF SACRUM: ICD-10-CM

## 2025-04-02 DIAGNOSIS — M54.16 LUMBAR RADICULOPATHY: ICD-10-CM

## 2025-04-02 DIAGNOSIS — M47.816 LUMBAR FACET ARTHROPATHY: Primary | ICD-10-CM

## 2025-04-02 DIAGNOSIS — M47.816 LUMBAR SPONDYLOSIS: ICD-10-CM

## 2025-04-02 DIAGNOSIS — M47.812 CERVICAL SPONDYLOSIS: ICD-10-CM

## 2025-04-02 DIAGNOSIS — M47.812 CERVICAL FACET JOINT SYNDROME: ICD-10-CM

## 2025-04-02 DIAGNOSIS — M51.9 LUMBAR DISC DISORDER: ICD-10-CM

## 2025-04-02 DIAGNOSIS — M50.90 CERVICAL DISC DISORDER: ICD-10-CM

## 2025-04-02 PROCEDURE — 99213 OFFICE O/P EST LOW 20 MIN: CPT | Performed by: PAIN MEDICINE

## 2025-04-02 PROCEDURE — 3074F SYST BP LT 130 MM HG: CPT | Performed by: PAIN MEDICINE

## 2025-04-02 PROCEDURE — 1123F ACP DISCUSS/DSCN MKR DOCD: CPT | Performed by: PAIN MEDICINE

## 2025-04-02 PROCEDURE — 1160F RVW MEDS BY RX/DR IN RCRD: CPT | Performed by: PAIN MEDICINE

## 2025-04-02 PROCEDURE — 3078F DIAST BP <80 MM HG: CPT | Performed by: PAIN MEDICINE

## 2025-04-02 PROCEDURE — G8417 CALC BMI ABV UP PARAM F/U: HCPCS | Performed by: PAIN MEDICINE

## 2025-04-02 PROCEDURE — 3017F COLORECTAL CA SCREEN DOC REV: CPT | Performed by: PAIN MEDICINE

## 2025-04-02 PROCEDURE — 1036F TOBACCO NON-USER: CPT | Performed by: PAIN MEDICINE

## 2025-04-02 PROCEDURE — G8427 DOCREV CUR MEDS BY ELIG CLIN: HCPCS | Performed by: PAIN MEDICINE

## 2025-04-02 PROCEDURE — 1159F MED LIST DOCD IN RCRD: CPT | Performed by: PAIN MEDICINE

## 2025-04-02 RX ORDER — SODIUM CHLORIDE 0.9 % (FLUSH) 0.9 %
5-40 SYRINGE (ML) INJECTION EVERY 12 HOURS SCHEDULED
OUTPATIENT
Start: 2025-04-02

## 2025-04-02 RX ORDER — SODIUM CHLORIDE 0.9 % (FLUSH) 0.9 %
5-40 SYRINGE (ML) INJECTION PRN
OUTPATIENT
Start: 2025-04-02

## 2025-04-02 RX ORDER — SODIUM CHLORIDE 9 MG/ML
INJECTION, SOLUTION INTRAVENOUS PRN
OUTPATIENT
Start: 2025-04-02

## 2025-04-02 NOTE — PROGRESS NOTES
[facility]  [address], [place] [address]  [phone]    Follow up Note      Per Stroud     Date of Visit:  4/2/2025    CC:  Patient presents for follow up   Chief Complaint   Patient presents with    Follow-up     Bilateral Lumbar 2,3,4 medial       HPI:  Follow up on his low back pain with no acute issues.  Appropriate analgesia with current medications regimen: yes fair.    Change in quality of symptoms:no.    Medication side effects:dizziness with Lyrica  Recent diagnostic testing:none  Recent interventional procedures:Bilateral L3,4,5 MBB with more than 75-80% improvement in his pain for three days.    He has not been on anticoagulation medications to include none. The patient  has not been on herbal supplements.  The patient is not diabetic.     Imaging:   Cervical spine MRI 2025  1. Status post ACDF from C4-C6 with corpectomy changes at C5.  2. Redemonstration of myelomalacia from C3-4 to C6.  3. Moderate central canal stenosis at C3-4. Mild to moderate central canal  stenosis at C4-5 and C5-6. Mild central canal stenosis at C6-7.  4. Multilevel neural foraminal stenoses, worst (severe) at bilateral C3-4,  bilateral C4-5, bilateral C5-6 and bilateral C6-7.  5. Enhancement along the supraspinous ligaments from the level of C6 into the  partially visualized thoracic spine is of an unclear etiology.  It may be the  result of injury, degenerative changes or an infectious/inflammatory process.    Lumbar spine MRI 2024  T12-L1: The previously described cystic space occupying lesion in the   left lateral epidural space has essentially resolved. There is persistent   mild right and mild to moderate left facet hypertrophy. Previously noted   canal stenosis as essentially resolved. There is a persistent disc bulge   extending into the inferior neural foramina. There is stable mild to   moderate left-sided facet hypertrophy. There is persistent moderate right

## 2025-04-02 NOTE — PROGRESS NOTES
Per Stroud presents to the [facility] on 4/2/2025. Per is complaining of pain back. The pain is constant. The pain is described as aching, stabbing, sharp, and penetrating. Pain is rated on his best day at a 5, on his worst day at a 8, and on average at a 7 on the VAS scale. He took his last dose of Baclofen,Lyrica and Ibuprofen 800mg  today.      Any procedures since your last visit: Yes, with 60 % relief 4 days    He is  on NSAIDS and  is not on anticoagulation medications   Pacemaker or defibrillator: No   Medication Contract and Consent for Opioid Use Documents Filed        No documents found                       There were no vitals taken for this visit.     No LMP for male patient.

## 2025-04-07 ENCOUNTER — ANESTHESIA EVENT (OUTPATIENT)
Dept: OPERATING ROOM | Age: 65
End: 2025-04-07
Payer: MEDICARE

## 2025-04-09 DIAGNOSIS — G56.23 ULNAR NEUROPATHY OF BOTH UPPER EXTREMITIES: ICD-10-CM

## 2025-04-09 LAB
ANION GAP SERPL CALCULATED.3IONS-SCNC: 15 MMOL/L (ref 7–16)
BUN SERPL-MCNC: 23 MG/DL (ref 6–23)
CALCIUM SERPL-MCNC: 9.7 MG/DL (ref 8.6–10.2)
CHLORIDE SERPL-SCNC: 104 MMOL/L (ref 98–107)
CO2 SERPL-SCNC: 23 MMOL/L (ref 22–29)
CREAT SERPL-MCNC: 0.8 MG/DL (ref 0.7–1.2)
GFR, ESTIMATED: >90 ML/MIN/1.73M2
GLUCOSE SERPL-MCNC: 94 MG/DL (ref 74–99)
POTASSIUM SERPL-SCNC: 4.4 MMOL/L (ref 3.5–5)
SODIUM SERPL-SCNC: 142 MMOL/L (ref 132–146)

## 2025-04-10 ASSESSMENT — COPD QUESTIONNAIRES: CAT_SEVERITY: MILD

## 2025-04-10 NOTE — ANESTHESIA PRE PROCEDURE
Department of Anesthesiology  Preprocedure Note       Name:  Per Stroud   Age:  65 y.o.  :  1960                                          MRN:  [id number]         Date:  4/10/2025      Surgeon: Surgeon(s):  Dhaval Molina DO    Procedure: Procedure(s):  LEFT ELBOW CUBITAL TUNNEL RELEASE-25    Medications prior to admission:   Prior to Admission medications    Medication Sig Start Date End Date Taking? Authorizing Provider   furosemide (LASIX) 20 MG tablet Take 1 tablet by mouth as needed 3/19/25  Yes Susan Morataya MD   rosuvastatin (CRESTOR) 5 MG tablet Take 1 tablet by mouth daily 3/17/25  Yes Susan Morataya MD   baclofen (LIORESAL) 10 MG tablet Take 1 tablet by mouth 3 times daily 3/3/25  Yes Carol Romero MD   tamsulosin (FLOMAX) 0.4 MG capsule Take 1 capsule by mouth in the morning and 1 capsule in the evening. 25  Yes Susan Morataya MD   magnesium oxide (MAG-OX) 400 (240 Mg) MG tablet Take 1 tablet by mouth in the morning, at noon, in the evening, and at bedtime 23  Yes Susan Morataya MD   vitamin D (CHOLECALCIFEROL) 125 MCG (5000 UT) CAPS capsule Take 1 capsule by mouth daily   Yes Susan Morataya MD   B Complex-C-Folic Acid (SM B SUPER VITAMIN COMPLEX PO) Take by mouth daily   Yes Susan Morataya MD   potassium chloride (KLOR-CON) 20 MEQ packet Take 20 mEq by mouth daily   Yes Susan Morataya MD   losartan (COZAAR) 100 MG tablet Take 1 tablet by mouth daily  Patient taking differently: Take 0.5 tablets by mouth daily 18  Yes Roscoe Ornelas DO   Lansoprazole (PREVACID PO) Take 30 mg by mouth daily AM   Yes Susan Morataya MD   ezetimibe (ZETIA) 10 MG tablet Take 1 tablet by mouth daily   Yes Susan Morataya MD   fenofibrate (TRICOR) 145 MG tablet Take 1 tablet by mouth daily 14  Yes Susan Morataya MD   ibuprofen (ADVIL;MOTRIN) 800 MG tablet Take 1 tablet by mouth every 8 hours as needed for                                                   BMI:   Wt Readings from Last 3 Encounters:   04/02/25 113.9 kg (251 lb)   03/20/25 113.9 kg (251 lb)   03/07/25 113.9 kg (251 lb)     Body mass index is 33.91 kg/m².    CBC:   Lab Results   Component Value Date/Time    WBC 7.3 07/08/2024 09:00 AM    RBC 4.48 07/08/2024 09:00 AM    HGB 14.7 07/08/2024 09:00 AM    HCT 43.7 07/08/2024 09:00 AM    MCV 97.5 07/08/2024 09:00 AM    RDW 12.9 07/08/2024 09:00 AM     07/08/2024 09:00 AM       CMP:   Lab Results   Component Value Date/Time     04/09/2025 01:45 PM    K 4.4 04/09/2025 01:45 PM    K 4.1 11/11/2020 08:30 AM     04/09/2025 01:45 PM    CO2 23 04/09/2025 01:45 PM    BUN 23 04/09/2025 01:45 PM    CREATININE 0.8 04/09/2025 01:45 PM    GFRAA >60 09/19/2022 02:46 PM    LABGLOM >90 04/09/2025 01:45 PM    LABGLOM >90 04/09/2024 10:40 AM    GLUCOSE 94 04/09/2025 01:45 PM    GLUCOSE 94 10/24/2011 12:35 PM    CALCIUM 9.7 04/09/2025 01:45 PM    BILITOT 0.2 07/08/2024 09:00 AM    ALKPHOS 43 07/08/2024 09:00 AM    AST 21 07/08/2024 09:00 AM    ALT 13 07/08/2024 09:00 AM       POC Tests: No results for input(s): \"POCGLU\", \"POCNA\", \"POCK\", \"POCCL\", \"POCBUN\", \"POCHEMO\", \"POCHCT\" in the last 72 hours.    Coags:   Lab Results   Component Value Date/Time    PROTIME 11.2 04/09/2024 10:40 AM    INR 1.0 04/09/2024 10:40 AM    APTT 30.0 04/09/2024 10:40 AM       HCG (If Applicable): No results found for: \"PREGTESTUR\", \"PREGSERUM\", \"HCG\", \"HCGQUANT\"     ABGs: No results found for: \"PHART\", \"PO2ART\", \"KCS4QAN\", \"TCX9BVN\", \"BEART\", \"D1BKEKIV\"     Type & Screen (If Applicable):  No results found for: \"LABABO\"    Drug/Infectious Status (If Applicable):  No results found for: \"HIV\", \"HEPCAB\"    COVID-19 Screening (If Applicable):   Lab Results   Component Value Date/Time    COVID19 Not Detected 06/23/2020 10:07 AM           Anesthesia Evaluation  Patient summary reviewed and Nursing notes reviewed   no history of anesthetic

## 2025-04-11 ENCOUNTER — HOSPITAL ENCOUNTER (OUTPATIENT)
Age: 65
Setting detail: OUTPATIENT SURGERY
Discharge: HOME | End: 2025-04-11
Attending: ORTHOPAEDIC SURGERY | Admitting: ORTHOPAEDIC SURGERY
Payer: MEDICARE

## 2025-04-11 ENCOUNTER — ANESTHESIA (OUTPATIENT)
Dept: OPERATING ROOM | Age: 65
End: 2025-04-11
Payer: MEDICARE

## 2025-04-11 VITALS
HEART RATE: 81 BPM | RESPIRATION RATE: 16 BRPM | OXYGEN SATURATION: 95 % | WEIGHT: 252 LBS | HEIGHT: 72 IN | DIASTOLIC BLOOD PRESSURE: 72 MMHG | TEMPERATURE: 97.6 F | SYSTOLIC BLOOD PRESSURE: 119 MMHG | BODY MASS INDEX: 34.13 KG/M2

## 2025-04-11 DIAGNOSIS — G56.23 ULNAR NEUROPATHY OF BOTH UPPER EXTREMITIES: Primary | ICD-10-CM

## 2025-04-11 PROCEDURE — 3700000000 HC ANESTHESIA ATTENDED CARE: Performed by: ORTHOPAEDIC SURGERY

## 2025-04-11 PROCEDURE — 6370000000 HC RX 637 (ALT 250 FOR IP): Performed by: ANESTHESIOLOGY

## 2025-04-11 PROCEDURE — 6360000002 HC RX W HCPCS: Performed by: NURSE ANESTHETIST, CERTIFIED REGISTERED

## 2025-04-11 PROCEDURE — 64718 REVISE ULNAR NERVE AT ELBOW: CPT | Performed by: ORTHOPAEDIC SURGERY

## 2025-04-11 PROCEDURE — 2500000003 HC RX 250 WO HCPCS

## 2025-04-11 PROCEDURE — 6360000002 HC RX W HCPCS: Performed by: ORTHOPAEDIC SURGERY

## 2025-04-11 PROCEDURE — 7100000001 HC PACU RECOVERY - ADDTL 15 MIN: Performed by: ORTHOPAEDIC SURGERY

## 2025-04-11 PROCEDURE — 3600000002 HC SURGERY LEVEL 2 BASE: Performed by: ORTHOPAEDIC SURGERY

## 2025-04-11 PROCEDURE — 2500000003 HC RX 250 WO HCPCS: Performed by: NURSE ANESTHETIST, CERTIFIED REGISTERED

## 2025-04-11 PROCEDURE — 3600000012 HC SURGERY LEVEL 2 ADDTL 15MIN: Performed by: ORTHOPAEDIC SURGERY

## 2025-04-11 PROCEDURE — 2709999900 HC NON-CHARGEABLE SUPPLY: Performed by: ORTHOPAEDIC SURGERY

## 2025-04-11 PROCEDURE — 7100000011 HC PHASE II RECOVERY - ADDTL 15 MIN: Performed by: ORTHOPAEDIC SURGERY

## 2025-04-11 PROCEDURE — 2580000003 HC RX 258: Performed by: ANESTHESIOLOGY

## 2025-04-11 PROCEDURE — 6360000002 HC RX W HCPCS: Performed by: ANESTHESIOLOGY

## 2025-04-11 PROCEDURE — 6360000002 HC RX W HCPCS

## 2025-04-11 PROCEDURE — 7100000010 HC PHASE II RECOVERY - FIRST 15 MIN: Performed by: ORTHOPAEDIC SURGERY

## 2025-04-11 PROCEDURE — 3700000001 HC ADD 15 MINUTES (ANESTHESIA): Performed by: ORTHOPAEDIC SURGERY

## 2025-04-11 PROCEDURE — 7100000000 HC PACU RECOVERY - FIRST 15 MIN: Performed by: ORTHOPAEDIC SURGERY

## 2025-04-11 RX ORDER — SODIUM CHLORIDE 0.9 % (FLUSH) 0.9 %
5-40 SYRINGE (ML) INJECTION EVERY 12 HOURS SCHEDULED
Status: DISCONTINUED | OUTPATIENT
Start: 2025-04-11 | End: 2025-04-11 | Stop reason: HOSPADM

## 2025-04-11 RX ORDER — ONDANSETRON 2 MG/ML
INJECTION INTRAMUSCULAR; INTRAVENOUS
Status: DISCONTINUED | OUTPATIENT
Start: 2025-04-11 | End: 2025-04-11 | Stop reason: SDUPTHER

## 2025-04-11 RX ORDER — GLYCOPYRROLATE 0.2 MG/ML
INJECTION INTRAMUSCULAR; INTRAVENOUS
Status: DISCONTINUED | OUTPATIENT
Start: 2025-04-11 | End: 2025-04-11 | Stop reason: SDUPTHER

## 2025-04-11 RX ORDER — DIPHENHYDRAMINE HYDROCHLORIDE 50 MG/ML
INJECTION, SOLUTION INTRAMUSCULAR; INTRAVENOUS
Status: DISCONTINUED | OUTPATIENT
Start: 2025-04-11 | End: 2025-04-11 | Stop reason: SDUPTHER

## 2025-04-11 RX ORDER — SODIUM CHLORIDE 9 MG/ML
INJECTION, SOLUTION INTRAVENOUS PRN
Status: DISCONTINUED | OUTPATIENT
Start: 2025-04-11 | End: 2025-04-11 | Stop reason: HOSPADM

## 2025-04-11 RX ORDER — MIDAZOLAM HYDROCHLORIDE 1 MG/ML
INJECTION, SOLUTION INTRAMUSCULAR; INTRAVENOUS
Status: DISCONTINUED | OUTPATIENT
Start: 2025-04-11 | End: 2025-04-11 | Stop reason: SDUPTHER

## 2025-04-11 RX ORDER — KETOROLAC TROMETHAMINE 30 MG/ML
INJECTION, SOLUTION INTRAMUSCULAR; INTRAVENOUS
Status: DISCONTINUED | OUTPATIENT
Start: 2025-04-11 | End: 2025-04-11 | Stop reason: SDUPTHER

## 2025-04-11 RX ORDER — OXYCODONE AND ACETAMINOPHEN 5; 325 MG/1; MG/1
1 TABLET ORAL EVERY 6 HOURS PRN
Qty: 28 TABLET | Refills: 0 | Status: SHIPPED | OUTPATIENT
Start: 2025-04-11 | End: 2025-04-18

## 2025-04-11 RX ORDER — MEPERIDINE HYDROCHLORIDE 25 MG/ML
12.5 INJECTION INTRAMUSCULAR; INTRAVENOUS; SUBCUTANEOUS EVERY 5 MIN PRN
Status: DISCONTINUED | OUTPATIENT
Start: 2025-04-11 | End: 2025-04-11 | Stop reason: HOSPADM

## 2025-04-11 RX ORDER — ONDANSETRON 2 MG/ML
4 INJECTION INTRAMUSCULAR; INTRAVENOUS
Status: DISCONTINUED | OUTPATIENT
Start: 2025-04-11 | End: 2025-04-11 | Stop reason: HOSPADM

## 2025-04-11 RX ORDER — EPHEDRINE SULFATE/0.9% NACL/PF 25 MG/5 ML
SYRINGE (ML) INTRAVENOUS
Status: DISCONTINUED | OUTPATIENT
Start: 2025-04-11 | End: 2025-04-11 | Stop reason: SDUPTHER

## 2025-04-11 RX ORDER — SODIUM CHLORIDE, SODIUM LACTATE, POTASSIUM CHLORIDE, CALCIUM CHLORIDE 600; 310; 30; 20 MG/100ML; MG/100ML; MG/100ML; MG/100ML
INJECTION, SOLUTION INTRAVENOUS CONTINUOUS
Status: DISCONTINUED | OUTPATIENT
Start: 2025-04-11 | End: 2025-04-11 | Stop reason: HOSPADM

## 2025-04-11 RX ORDER — PROPOFOL 10 MG/ML
INJECTION, EMULSION INTRAVENOUS
Status: DISCONTINUED | OUTPATIENT
Start: 2025-04-11 | End: 2025-04-11 | Stop reason: SDUPTHER

## 2025-04-11 RX ORDER — BUPIVACAINE HYDROCHLORIDE 2.5 MG/ML
INJECTION, SOLUTION EPIDURAL; INFILTRATION; INTRACAUDAL; PERINEURAL PRN
Status: DISCONTINUED | OUTPATIENT
Start: 2025-04-11 | End: 2025-04-11 | Stop reason: ALTCHOICE

## 2025-04-11 RX ORDER — PROCHLORPERAZINE EDISYLATE 5 MG/ML
5 INJECTION INTRAMUSCULAR; INTRAVENOUS
Status: DISCONTINUED | OUTPATIENT
Start: 2025-04-11 | End: 2025-04-11 | Stop reason: HOSPADM

## 2025-04-11 RX ORDER — DEXAMETHASONE SODIUM PHOSPHATE 10 MG/ML
INJECTION, SOLUTION INTRAMUSCULAR; INTRAVENOUS
Status: DISCONTINUED | OUTPATIENT
Start: 2025-04-11 | End: 2025-04-11 | Stop reason: SDUPTHER

## 2025-04-11 RX ORDER — SODIUM CHLORIDE 0.9 % (FLUSH) 0.9 %
5-40 SYRINGE (ML) INJECTION PRN
Status: DISCONTINUED | OUTPATIENT
Start: 2025-04-11 | End: 2025-04-11 | Stop reason: HOSPADM

## 2025-04-11 RX ORDER — OXYCODONE HYDROCHLORIDE 5 MG/1
5 TABLET ORAL
Status: COMPLETED | OUTPATIENT
Start: 2025-04-11 | End: 2025-04-11

## 2025-04-11 RX ORDER — FENTANYL CITRATE 50 UG/ML
INJECTION, SOLUTION INTRAMUSCULAR; INTRAVENOUS
Status: DISCONTINUED | OUTPATIENT
Start: 2025-04-11 | End: 2025-04-11 | Stop reason: SDUPTHER

## 2025-04-11 RX ORDER — NALOXONE HYDROCHLORIDE 0.4 MG/ML
INJECTION, SOLUTION INTRAMUSCULAR; INTRAVENOUS; SUBCUTANEOUS PRN
Status: DISCONTINUED | OUTPATIENT
Start: 2025-04-11 | End: 2025-04-11 | Stop reason: HOSPADM

## 2025-04-11 RX ADMIN — FENTANYL CITRATE 50 MCG: 50 INJECTION, SOLUTION INTRAMUSCULAR; INTRAVENOUS at 11:27

## 2025-04-11 RX ADMIN — GLYCOPYRROLATE 0.2 MG: 0.2 INJECTION, SOLUTION INTRAMUSCULAR; INTRAVENOUS at 10:44

## 2025-04-11 RX ADMIN — Medication 30 MG: at 10:48

## 2025-04-11 RX ADMIN — FENTANYL CITRATE 50 MCG: 50 INJECTION, SOLUTION INTRAMUSCULAR; INTRAVENOUS at 10:48

## 2025-04-11 RX ADMIN — FENTANYL CITRATE 50 MCG: 50 INJECTION, SOLUTION INTRAMUSCULAR; INTRAVENOUS at 10:44

## 2025-04-11 RX ADMIN — PHENYLEPHRINE HYDROCHLORIDE 100 MCG: 10 INJECTION INTRAVENOUS at 11:03

## 2025-04-11 RX ADMIN — PROPOFOL 150 MG: 10 INJECTION, EMULSION INTRAVENOUS at 10:48

## 2025-04-11 RX ADMIN — EPHEDRINE SULFATE 10 MG: 5 INJECTION INTRAVENOUS at 10:55

## 2025-04-11 RX ADMIN — DIPHENHYDRAMINE HYDROCHLORIDE 12.5 MG: 50 INJECTION, SOLUTION INTRAMUSCULAR; INTRAVENOUS at 11:17

## 2025-04-11 RX ADMIN — PHENYLEPHRINE HYDROCHLORIDE 100 MCG: 10 INJECTION INTRAVENOUS at 11:09

## 2025-04-11 RX ADMIN — HYDROMORPHONE HYDROCHLORIDE 0.5 MG: 1 INJECTION, SOLUTION INTRAMUSCULAR; INTRAVENOUS; SUBCUTANEOUS at 12:04

## 2025-04-11 RX ADMIN — SODIUM CHLORIDE, POTASSIUM CHLORIDE, SODIUM LACTATE AND CALCIUM CHLORIDE: 600; 310; 30; 20 INJECTION, SOLUTION INTRAVENOUS at 10:18

## 2025-04-11 RX ADMIN — KETOROLAC TROMETHAMINE 30 MG: 30 INJECTION, SOLUTION INTRAMUSCULAR at 11:35

## 2025-04-11 RX ADMIN — PHENYLEPHRINE HYDROCHLORIDE 100 MCG: 10 INJECTION INTRAVENOUS at 11:19

## 2025-04-11 RX ADMIN — OXYCODONE 5 MG: 5 TABLET ORAL at 12:32

## 2025-04-11 RX ADMIN — MIDAZOLAM 2 MG: 1 INJECTION INTRAMUSCULAR; INTRAVENOUS at 10:40

## 2025-04-11 RX ADMIN — CEFAZOLIN SODIUM 2000 MG: 1 POWDER, FOR SOLUTION INTRAMUSCULAR; INTRAVENOUS at 10:54

## 2025-04-11 RX ADMIN — ONDANSETRON 4 MG: 2 INJECTION INTRAMUSCULAR; INTRAVENOUS at 11:17

## 2025-04-11 RX ADMIN — PHENYLEPHRINE HYDROCHLORIDE 100 MCG: 10 INJECTION INTRAVENOUS at 11:31

## 2025-04-11 RX ADMIN — DEXAMETHASONE SODIUM PHOSPHATE 10 MG: 10 INJECTION, SOLUTION INTRAMUSCULAR; INTRAVENOUS at 11:02

## 2025-04-11 ASSESSMENT — PAIN DESCRIPTION - ORIENTATION: ORIENTATION: LEFT

## 2025-04-11 ASSESSMENT — PAIN DESCRIPTION - DESCRIPTORS
DESCRIPTORS: ACHING

## 2025-04-11 ASSESSMENT — PAIN - FUNCTIONAL ASSESSMENT
PAIN_FUNCTIONAL_ASSESSMENT: 0-10
PAIN_FUNCTIONAL_ASSESSMENT: NONE - DENIES PAIN

## 2025-04-11 ASSESSMENT — PAIN DESCRIPTION - LOCATION
LOCATION: ELBOW
LOCATION: ELBOW

## 2025-04-11 ASSESSMENT — PAIN SCALES - GENERAL
PAINLEVEL_OUTOF10: 7
PAINLEVEL_OUTOF10: 6

## 2025-04-11 NOTE — OP NOTE
Operative Note      Patient: Per Stroud  YOB: 1960  MRN: [id number]    Date of Procedure: 4/11/2025    Pre-Op Diagnosis Codes:      * Cubital tunnel syndrome on right [G56.21]    Post-Op Diagnosis: Same       Procedure(s):  LEFT ELBOW CUBITAL TUNNEL RELEASE-4/11/25    Surgeon(s):  Dhaval Long DO    Assistant:   Resident: Scott José DO; Damien Addison DO    Anesthesia: General    Estimated Blood Loss (mL): less than 100     Complications: None    Specimens:   * No specimens in log *    Implants:  * No implants in log *      Drains: * No LDAs found *    Findings:  Infection Present At Time Of Surgery (PATOS) (choose all levels that have infection present):  No infection present  Other Findings: as above    Detailed Description of Procedure:   Below'    SURGEON: DHAVAL LONG D.O.   ASSISTANT: as above  PREOPERATIVE DIAGNOSIS: left  elbow cubital tunnel syndrome.   POSTOPERATIVE DIAGNOSIS:  leftelbow cubital tunnel syndrome.   PROCEDURE: left Cubital tunnel release.   ANESTHESIA: general  ESTIMATED BLOOD LOSS: Minimal.   COMPLICATIONS: None.   OPERATIVE PROCEDURE: The patient was brought to the operative suite and was   given a general anesthesia. The left arm was identified with a preoperative   time out, placed a tourniquet around the left arm, prepped and draped the arm   in a sterile fashion.   I outlined an incision along the medial elbow, along the area of the cubital   tunnel. I made an incision with a #15 blade through skin and subcutaneous   tissue. I dissected down to the level of the medial epicondyle and medial   elbow. I exposed the underlying nerve and felt it. I then dove down into the   cubital tunnel, right through Sanders ligament, released the ligament   proximally and distally to full extent, proximally to blake of Brandy. I   released the complete extent of the Sanders ligament, dissected down through   into the flexor carpi ulnaris muscle belly and fascia.

## 2025-04-11 NOTE — DISCHARGE INSTRUCTIONS
Cubital Tunnel Post-op Instructions    [facility]  [phone]  Dhaval Molina D.O.        Change your operative dressing on post-op day #5    Use bandaid for dressing. May remove sling when comfortable    You may shower with soap and water on post-op day #1 but keep your incision clean and dry.    Non-weight bearing activities with affected hand until post-op appointment.    No lifting over 5 pounds.     May begin moving fingers immediately.  No heavy lifting.  No pushing or pulling.    Take pain medications as prescribed.  Take with food.______________________  If you anticipate the need for a refill on your medication and the weekend is approaching, please call the office by noon on Friday.  No refills will be called in over the weekend.  DO NOT take Tylenol products while taking prescribed pain medicine.    Resume regular diet and medications (unless otherwise directed by your doctor).    You should have a responsible adult with you for 24 hours.    If you have any questions or concerns, please call the office.       If any problems occur or if you have any further questions, please call your doctor as soon as possible.  If you find that you cannot reach your doctor but feel that your condition needs a doctor's attention go to the emergency room.    I have received a copy and understand these instructions.         Infection After Surgery: Care Instructions  Overview  After surgery, an infection is always possible. It doesn't mean that the surgery didn't go well.  Because an infection can be serious, your doctor has taken steps to manage it.  Your doctor checked the infection and cleaned it if necessary. Your doctor may have made an opening in the area so that the pus can drain out. You may have gauze in the cut so that the area will stay open and keep draining. You may need antibiotics.  You will need to follow up with your doctor to make sure the infection has gone

## 2025-04-13 NOTE — H&P
Updated H&P    Chief Complaint   Patient presents with    Elbow Pain       Patient is here for bilateral elbow pain. Patient states he had an EMG and was referred here to schedule surgery.         Per Stroud  male  presents today for evaluation of his b/l elbow pain L>R.  The patient states the pain started  2 years ago.  The pain is aching and numbness, tingling  in nature.  He has been having numbness from the elbow down into the entire hand.  It is worse with movement, lifting and better with rest.  The patient does complain of night pain.  The patient is right hand dominant.  The patient is not working at this time.      Past Medical History           Past Medical History:   Diagnosis Date    Acid reflux      GERD (gastroesophageal reflux disease)      Hyperlipidemia      Hypertension      Hypokalemia      Hypomagnesemia      Low back pain      Neuropathy       Years ago         Past Surgical History             Past Surgical History:   Procedure Laterality Date    ANESTHESIA NERVE BLOCK Right 01/14/2020     RIGHT L2,3,4 MEDIAL BRANCH BLOCK WITHOUT SEDATION (CPT 24577,25381) performed by Sean Cervantes MD at [facility]    ANESTHESIA NERVE BLOCK Right 02/20/2020     RIGHT L2,3,4 MEDIAL BRANCH BLOCK (CPT 67618,60354) performed by Sean Cervantes MD at [facility]    ANESTHESIA NERVE BLOCK Left 06/29/2020     LEFT L2 L3 L4 MEDIAL BRANCH BLOCK (CPT 11009) (WANTS AFTERNOON APPOINTMENT) performed by Sean Cervantes MD at [facility]    BACK SURGERY         May 4, 2018 decompression with Dr. Daily.     CARPAL TUNNEL RELEASE Left 01/24/2014    CARPAL TUNNEL RELEASE         left    CERVICAL FUSION   11/01/2021    COLONOSCOPY        FRACTURE SURGERY         arm    FRACTURE SURGERY         left arm    HERNIA REPAIR        HERNIA REPAIR         x2 child    KNEE ARTHROSCOPY Left 02/20/2015    NERVE BLOCK Right 01/14/2020    NERVE BLOCK Right 02/20/2020     L2,3,4 medial     NERVE BLOCK Right 05/04/2020

## 2025-04-14 NOTE — ANESTHESIA POSTPROCEDURE EVALUATION
Department of Anesthesiology  Postprocedure Note    Patient: Per Stroud  MRN: [id number]  YOB: 1960  Date of evaluation: 4/14/2025    Procedure Summary       Date: 04/11/25 Room / Location: [address]    Anesthesia Start: 1040 Anesthesia Stop: 1150    Procedure: LEFT ELBOW CUBITAL TUNNEL RELEASE-4/11/25 (Left: Elbow) Diagnosis:       Cubital tunnel syndrome on right      (Cubital tunnel syndrome on right [G56.21])    Surgeons: Dhaval Molina DO Responsible Provider: Regan Monroe DO    Anesthesia Type: General ASA Status: 3            Anesthesia Type: General    Nemesio Phase I: Nemesio Score: 10    Nemesio Phase II: Nemesio Score: 10    Anesthesia Post Evaluation    Patient location during evaluation: PACU  Patient participation: complete - patient participated  Level of consciousness: awake and alert  Pain score: 1  Airway patency: patent  Nausea & Vomiting: no nausea and no vomiting  Cardiovascular status: hemodynamically stable  Respiratory status: acceptable  Hydration status: euvolemic  Pain management: adequate    No notable events documented.

## 2025-04-17 ENCOUNTER — OFFICE VISIT (OUTPATIENT)
Dept: ONCOLOGY | Age: 65
End: 2025-04-17
Payer: MEDICARE

## 2025-04-17 ENCOUNTER — HOSPITAL ENCOUNTER (OUTPATIENT)
Dept: INFUSION THERAPY | Age: 65
Discharge: HOME | End: 2025-04-17
Payer: MEDICARE

## 2025-04-17 VITALS
WEIGHT: 251 LBS | SYSTOLIC BLOOD PRESSURE: 146 MMHG | TEMPERATURE: 97.2 F | DIASTOLIC BLOOD PRESSURE: 81 MMHG | HEIGHT: 72 IN | BODY MASS INDEX: 34 KG/M2 | HEART RATE: 81 BPM | OXYGEN SATURATION: 93 %

## 2025-04-17 DIAGNOSIS — G62.9 NEUROPATHY: ICD-10-CM

## 2025-04-17 DIAGNOSIS — D47.2 MGUS (MONOCLONAL GAMMOPATHY OF UNKNOWN SIGNIFICANCE): ICD-10-CM

## 2025-04-17 DIAGNOSIS — D47.2 MGUS (MONOCLONAL GAMMOPATHY OF UNKNOWN SIGNIFICANCE): Primary | ICD-10-CM

## 2025-04-17 LAB
ALBUMIN SERPL-MCNC: 4.3 G/DL (ref 3.5–5.2)
ALP SERPL-CCNC: 38 U/L (ref 40–129)
ALT SERPL-CCNC: 50 U/L (ref 0–50)
ANION GAP SERPL CALCULATED.3IONS-SCNC: 11 MMOL/L (ref 7–16)
AST SERPL-CCNC: 31 U/L (ref 0–50)
BASOPHILS # BLD: 0.04 K/UL (ref 0–0.2)
BASOPHILS NFR BLD: 1 % (ref 0–2)
BILIRUB SERPL-MCNC: 0.5 MG/DL (ref 0–1.2)
BUN SERPL-MCNC: 20 MG/DL (ref 8–23)
CALCIUM SERPL-MCNC: 10.5 MG/DL (ref 8.8–10.2)
CHLORIDE SERPL-SCNC: 100 MMOL/L (ref 98–107)
CO2 SERPL-SCNC: 28 MMOL/L (ref 22–29)
CREAT SERPL-MCNC: 0.8 MG/DL (ref 0.7–1.2)
EOSINOPHIL # BLD: 0.21 K/UL (ref 0.05–0.5)
EOSINOPHILS RELATIVE PERCENT: 3 % (ref 0–6)
ERYTHROCYTE [DISTWIDTH] IN BLOOD BY AUTOMATED COUNT: 12.3 % (ref 11.5–15)
FOLATE SERPL-MCNC: 37.8 NG/ML (ref 4.6–34.8)
FREE KAPPA/LAMBDA RATIO: 1.25 (ref 0.22–1.74)
GFR, ESTIMATED: >90 ML/MIN/1.73M2
GLUCOSE SERPL-MCNC: 94 MG/DL (ref 74–99)
HCT VFR BLD AUTO: 42.4 % (ref 37–54)
HGB BLD-MCNC: 14.3 G/DL (ref 12.5–16.5)
IGA SERPL-MCNC: 283 MG/DL (ref 70–400)
IGG SERPL-MCNC: 881 MG/DL (ref 700–1600)
IGM SERPL-MCNC: 130 MG/DL (ref 40–230)
IMM GRANULOCYTES # BLD AUTO: <0.03 K/UL (ref 0–0.58)
IMM GRANULOCYTES NFR BLD: 0 % (ref 0–5)
KAPPA LC FREE SER-MCNC: 27.6 MG/L
LAMBDA LC FREE SERPL-MCNC: 22.1 MG/L (ref 4.2–27.7)
LDH SERPL-CCNC: 197 U/L (ref 135–225)
LYMPHOCYTES NFR BLD: 1.39 K/UL (ref 1.5–4)
LYMPHOCYTES RELATIVE PERCENT: 21 % (ref 20–42)
MAGNESIUM SERPL-MCNC: 1.6 MG/DL (ref 1.6–2.4)
MCH RBC QN AUTO: 32.9 PG (ref 26–35)
MCHC RBC AUTO-ENTMCNC: 33.7 G/DL (ref 32–34.5)
MCV RBC AUTO: 97.5 FL (ref 80–99.9)
MONOCYTES NFR BLD: 0.62 K/UL (ref 0.1–0.95)
MONOCYTES NFR BLD: 9 % (ref 2–12)
NEUTROPHILS NFR BLD: 66 % (ref 43–80)
NEUTS SEG NFR BLD: 4.49 K/UL (ref 1.8–7.3)
PLATELET # BLD AUTO: 307 K/UL (ref 130–450)
PMV BLD AUTO: 9.2 FL (ref 7–12)
POTASSIUM SERPL-SCNC: 4.7 MMOL/L (ref 3.5–5.1)
PROT SERPL-MCNC: 7.2 G/DL (ref 6.4–8.3)
RBC # BLD AUTO: 4.35 M/UL (ref 3.8–5.8)
SEND OUT REPORT: NORMAL
SODIUM SERPL-SCNC: 139 MMOL/L (ref 136–145)
TEST NAME: NORMAL
VIT B12 SERPL-MCNC: 614 PG/ML (ref 232–1245)
WBC OTHER # BLD: 6.8 K/UL (ref 4.5–11.5)

## 2025-04-17 PROCEDURE — 1159F MED LIST DOCD IN RCRD: CPT | Performed by: STUDENT IN AN ORGANIZED HEALTH CARE EDUCATION/TRAINING PROGRAM

## 2025-04-17 PROCEDURE — 1036F TOBACCO NON-USER: CPT | Performed by: STUDENT IN AN ORGANIZED HEALTH CARE EDUCATION/TRAINING PROGRAM

## 2025-04-17 PROCEDURE — 86335 IMMUNFIX E-PHORSIS/URINE/CSF: CPT

## 2025-04-17 PROCEDURE — 80053 COMPREHEN METABOLIC PANEL: CPT

## 2025-04-17 PROCEDURE — 82784 ASSAY IGA/IGD/IGG/IGM EACH: CPT

## 2025-04-17 PROCEDURE — 85025 COMPLETE CBC W/AUTO DIFF WBC: CPT

## 2025-04-17 PROCEDURE — 84166 PROTEIN E-PHORESIS/URINE/CSF: CPT

## 2025-04-17 PROCEDURE — 84630 ASSAY OF ZINC: CPT

## 2025-04-17 PROCEDURE — 83735 ASSAY OF MAGNESIUM: CPT

## 2025-04-17 PROCEDURE — 3077F SYST BP >= 140 MM HG: CPT | Performed by: STUDENT IN AN ORGANIZED HEALTH CARE EDUCATION/TRAINING PROGRAM

## 2025-04-17 PROCEDURE — 84165 PROTEIN E-PHORESIS SERUM: CPT

## 2025-04-17 PROCEDURE — 99204 OFFICE O/P NEW MOD 45 MIN: CPT | Performed by: STUDENT IN AN ORGANIZED HEALTH CARE EDUCATION/TRAINING PROGRAM

## 2025-04-17 PROCEDURE — 82525 ASSAY OF COPPER: CPT

## 2025-04-17 PROCEDURE — 82232 ASSAY OF BETA-2 PROTEIN: CPT

## 2025-04-17 PROCEDURE — 3079F DIAST BP 80-89 MM HG: CPT | Performed by: STUDENT IN AN ORGANIZED HEALTH CARE EDUCATION/TRAINING PROGRAM

## 2025-04-17 PROCEDURE — 84156 ASSAY OF PROTEIN URINE: CPT

## 2025-04-17 PROCEDURE — G8427 DOCREV CUR MEDS BY ELIG CLIN: HCPCS | Performed by: STUDENT IN AN ORGANIZED HEALTH CARE EDUCATION/TRAINING PROGRAM

## 2025-04-17 PROCEDURE — 86334 IMMUNOFIX E-PHORESIS SERUM: CPT

## 2025-04-17 PROCEDURE — 99214 OFFICE O/P EST MOD 30 MIN: CPT

## 2025-04-17 PROCEDURE — 3017F COLORECTAL CA SCREEN DOC REV: CPT | Performed by: STUDENT IN AN ORGANIZED HEALTH CARE EDUCATION/TRAINING PROGRAM

## 2025-04-17 PROCEDURE — 84155 ASSAY OF PROTEIN SERUM: CPT

## 2025-04-17 PROCEDURE — 83615 LACTATE (LD) (LDH) ENZYME: CPT

## 2025-04-17 PROCEDURE — G8417 CALC BMI ABV UP PARAM F/U: HCPCS | Performed by: STUDENT IN AN ORGANIZED HEALTH CARE EDUCATION/TRAINING PROGRAM

## 2025-04-17 PROCEDURE — 88184 FLOWCYTOMETRY/ TC 1 MARKER: CPT

## 2025-04-17 PROCEDURE — 88185 FLOWCYTOMETRY/TC ADD-ON: CPT

## 2025-04-17 PROCEDURE — 36415 COLL VENOUS BLD VENIPUNCTURE: CPT

## 2025-04-17 PROCEDURE — 82607 VITAMIN B-12: CPT

## 2025-04-17 PROCEDURE — 1123F ACP DISCUSS/DSCN MKR DOCD: CPT | Performed by: STUDENT IN AN ORGANIZED HEALTH CARE EDUCATION/TRAINING PROGRAM

## 2025-04-17 PROCEDURE — 82746 ASSAY OF FOLIC ACID SERUM: CPT

## 2025-04-17 PROCEDURE — 83521 IG LIGHT CHAINS FREE EACH: CPT

## 2025-04-17 NOTE — PROGRESS NOTES
Per Waynejose  1960 65 y.o.      Referring Physician: N/A    PCP: Cruzito Reynolds DO    There were no vitals filed for this visit.     Wt Readings from Last 3 Encounters:   25 114.3 kg (252 lb)   25 113.9 kg (251 lb)   25 113.9 kg (251 lb)        There is no height or weight on file to calculate BMI.          Chief Complaint: No chief complaint on file.          LMP: n/a    Age at first Menses: n/a    : n/a    Para: n/a          Current Outpatient Medications:     oxyCODONE-acetaminophen (PERCOCET) 5-325 MG per tablet, Take 1 tablet by mouth every 6 hours as needed for Pain for up to 7 days. Intended supply: 7 days. Take lowest dose possible to manage pain Max Daily Amount: 4 tablets, Disp: 28 tablet, Rfl: 0    furosemide (LASIX) 20 MG tablet, Take 1 tablet by mouth as needed, Disp: , Rfl:     baclofen (LIORESAL) 10 MG tablet, Take 1 tablet by mouth 3 times daily, Disp: 90 tablet, Rfl: 3    tamsulosin (FLOMAX) 0.4 MG capsule, Take 1 capsule by mouth in the morning and 1 capsule in the evening., Disp: , Rfl:     magnesium oxide (MAG-OX) 400 (240 Mg) MG tablet, Take 1 tablet by mouth in the morning, at noon, in the evening, and at bedtime, Disp: , Rfl:     vitamin D (CHOLECALCIFEROL) 125 MCG (5000 UT) CAPS capsule, Take 1 capsule by mouth daily, Disp: , Rfl:     B Complex-C-Folic Acid (SM B SUPER VITAMIN COMPLEX PO), Take by mouth daily, Disp: , Rfl:     potassium chloride (KLOR-CON) 20 MEQ packet, Take 20 mEq by mouth daily, Disp: , Rfl:     ibuprofen (ADVIL;MOTRIN) 800 MG tablet, Take 1 tablet by mouth every 8 hours as needed for Pain Stopped m5 days pre op, Disp: , Rfl:     losartan (COZAAR) 100 MG tablet, Take 1 tablet by mouth daily (Patient taking differently: Take 0.5 tablets by mouth daily), Disp: 30 tablet, Rfl: 3    Lansoprazole (PREVACID PO), Take 30 mg by mouth daily AM, Disp: , Rfl:     ezetimibe (ZETIA) 10 MG tablet, Take 1 tablet by mouth daily, Disp: , Rfl:

## 2025-04-17 NOTE — PROGRESS NOTES
[facility] PHYSICIANS [place] [facility] MED ONCOLOGY  [address] [facility] [address]  Dept: [phone]  Loc: [phone]    Kathryn Narayanan MD   ·   MD Isabel Jenkins APRN  ·  LIZ Tinajero      [place] [facility] in [place]  [address] [place] [address]  P: [phone]  F: [phone] [place] [facility] [place]  [address] [place] [address]  P: [phone]  F: [phone]  [place] [facility] in [place]  [address] [place] [address]  P: [phone]  F: [phone]                 Hematology/Oncology   Clinic Consultation Note              Patient: Per Strodu,  65 y.o. male    Date of Encounter: 04/17/2025     Referring Provider:  Carol Romero MD    Reason for Visit:   Neuropathy, MGUS        PCP:  Cruzito Reynolds DO      Chief Complaint   Patient presents with    New Patient         History of Present Illness of Initial Consultation (4/17/2025):  The patient is a 65 y.o. male comes in for MGUS, in the setting of chronic neuropathy.  The patient has had history of C-spine injury years ago.  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  And had undergone C-spine fusion.  And he also complains of chronic low back issues, in which she has associated right lower extremity tingling.    He also has chronic tingling/neuropathy of both extremities, thought to be associate with ulnar nerve impingement.    Additional surgical history includes right elbow cubital tunnel release on 2/7/2025, and also completed left cubital tunnel release earlier this month by orthopedic surgery.    He follows closely with neurology for his neuropathy, in which she had completed MRI imaging, redemonstrating his chronic findings.  Furthermore paraproteinemia panel was checked, which was positive for a faint/minor band/minute amount of IgG kappa monoclonal protein.  And he was

## 2025-04-18 PROBLEM — M43.06 LUMBAR SPONDYLOLYSIS: Status: ACTIVE | Noted: 2025-04-02

## 2025-04-18 LAB
ALBUMIN SERPL-MCNC: 3.7 G/DL (ref 3.5–4.7)
ALPHA1 GLOB SERPL ELPH-MCNC: 0.3 G/DL (ref 0.2–0.4)
ALPHA2 GLOB SERPL ELPH-MCNC: 0.8 G/DL (ref 0.5–1)
B-GLOBULIN SERPL ELPH-MCNC: 1.2 G/DL (ref 0.8–1.3)
GAMMA GLOB SERPL ELPH-MCNC: 1 G/DL (ref 0.7–1.6)
INTERPRETATION SERPL IFE-IMP: NORMAL
M PROTEIN SERPL ELPH-MCNC: 0.5 G/DL
P E INTERPRETATION, U: NORMAL
PATH REV: NORMAL
PATHOLOGIST: NORMAL
PATHOLOGIST: NORMAL
PROT PATTERN SERPL ELPH-IMP: NORMAL
PROT SERPL-MCNC: 7.1 G/DL (ref 6.4–8.3)
SPECIMEN TYPE: NORMAL
SPECIMEN TYPE: NORMAL
URINE IFX INTERP: NORMAL

## 2025-04-19 LAB
B2 MICROGLOB SERPL IA-MCNC: 2.4 MG/L
ZINC SERPL-MCNC: 73.6 UG/DL (ref 60–120)

## 2025-04-20 LAB — COPPER SERPL-MCNC: 110 UG/DL (ref 70–140)

## 2025-04-21 LAB
SEND OUT REPORT: NORMAL
TEST NAME: NORMAL

## 2025-04-21 RX ORDER — POTASSIUM CHLORIDE 1500 MG/1
20 TABLET, EXTENDED RELEASE ORAL DAILY
COMMUNITY

## 2025-04-22 DIAGNOSIS — G56.23 ULNAR NEUROPATHY OF BOTH UPPER EXTREMITIES: ICD-10-CM

## 2025-04-22 RX ORDER — OXYCODONE AND ACETAMINOPHEN 5; 325 MG/1; MG/1
1 TABLET ORAL EVERY 6 HOURS PRN
Qty: 28 TABLET | Refills: 0 | Status: SHIPPED | OUTPATIENT
Start: 2025-04-22 | End: 2025-04-29

## 2025-04-22 NOTE — TELEPHONE ENCOUNTER
.Last appointment 12/23/2024  Next appointment   Future Appointments   Date Time Provider Department Center   4/28/2025 10:50 AM Dhaval Molina DO [facility]   5/8/2025  3:45 PM Francisco De Los Santos MD MED ONC [facility]   5/13/2025 11:45 AM Sean Cervantes MD [facility]   7/14/2025 10:40 AM Carol Romero MD [facility] NEURO Neurology -      Last refill 04/11/2025  DOS: 04/11/2025      Patient called in requesting refill of :    oxyCODONE-acetaminophen (PERCOCET) 5-325 MG per tablet      [id number] - [place], [place] - [address] - P [phone] - F [phone]

## 2025-04-24 DIAGNOSIS — D47.2 MGUS (MONOCLONAL GAMMOPATHY OF UNKNOWN SIGNIFICANCE): Primary | ICD-10-CM

## 2025-04-24 DIAGNOSIS — Z96.652 S/P TOTAL KNEE ARTHROPLASTY, LEFT: Primary | ICD-10-CM

## 2025-04-24 DIAGNOSIS — G62.9 NEUROPATHY: ICD-10-CM

## 2025-04-28 ENCOUNTER — OFFICE VISIT (OUTPATIENT)
Dept: ORTHOPEDIC SURGERY | Age: 65
End: 2025-04-28

## 2025-04-28 ENCOUNTER — HOSPITAL ENCOUNTER (OUTPATIENT)
Age: 65
Setting detail: OUTPATIENT SURGERY
Discharge: HOME | End: 2025-04-28
Attending: PAIN MEDICINE | Admitting: PAIN MEDICINE
Payer: MEDICARE

## 2025-04-28 ENCOUNTER — HOSPITAL ENCOUNTER (OUTPATIENT)
Dept: OPERATING ROOM | Age: 65
Setting detail: OUTPATIENT SURGERY
Discharge: HOME | End: 2025-04-28
Attending: PAIN MEDICINE
Payer: MEDICARE

## 2025-04-28 VITALS
SYSTOLIC BLOOD PRESSURE: 129 MMHG | HEART RATE: 80 BPM | DIASTOLIC BLOOD PRESSURE: 71 MMHG | BODY MASS INDEX: 34 KG/M2 | WEIGHT: 251 LBS | HEIGHT: 72 IN | OXYGEN SATURATION: 94 % | TEMPERATURE: 97.8 F | RESPIRATION RATE: 23 BRPM

## 2025-04-28 VITALS — BODY MASS INDEX: 34 KG/M2 | HEIGHT: 72 IN | WEIGHT: 251 LBS

## 2025-04-28 DIAGNOSIS — Z98.890 S/P CUBITAL TUNNEL RELEASE: Primary | ICD-10-CM

## 2025-04-28 DIAGNOSIS — M47.896 OTHER OSTEOARTHRITIS OF SPINE, LUMBAR REGION: ICD-10-CM

## 2025-04-28 DIAGNOSIS — Z96.652 S/P TOTAL KNEE ARTHROPLASTY, LEFT: ICD-10-CM

## 2025-04-28 PROCEDURE — 7100000011 HC PHASE II RECOVERY - ADDTL 15 MIN: Performed by: PAIN MEDICINE

## 2025-04-28 PROCEDURE — 99024 POSTOP FOLLOW-UP VISIT: CPT | Performed by: ORTHOPAEDIC SURGERY

## 2025-04-28 PROCEDURE — 6360000002 HC RX W HCPCS: Performed by: PAIN MEDICINE

## 2025-04-28 PROCEDURE — 64493 INJ PARAVERT F JNT L/S 1 LEV: CPT | Performed by: PAIN MEDICINE

## 2025-04-28 PROCEDURE — 3600000005 HC SURGERY LEVEL 5 BASE: Performed by: PAIN MEDICINE

## 2025-04-28 PROCEDURE — 7100000010 HC PHASE II RECOVERY - FIRST 15 MIN: Performed by: PAIN MEDICINE

## 2025-04-28 PROCEDURE — 2709999900 HC NON-CHARGEABLE SUPPLY: Performed by: PAIN MEDICINE

## 2025-04-28 PROCEDURE — 64494 INJ PARAVERT F JNT L/S 2 LEV: CPT | Performed by: PAIN MEDICINE

## 2025-04-28 RX ORDER — SODIUM CHLORIDE 9 MG/ML
INJECTION, SOLUTION INTRAVENOUS PRN
Status: DISCONTINUED | OUTPATIENT
Start: 2025-04-28 | End: 2025-04-28 | Stop reason: HOSPADM

## 2025-04-28 RX ORDER — SODIUM CHLORIDE 0.9 % (FLUSH) 0.9 %
5-40 SYRINGE (ML) INJECTION PRN
Status: DISCONTINUED | OUTPATIENT
Start: 2025-04-28 | End: 2025-04-28 | Stop reason: HOSPADM

## 2025-04-28 RX ORDER — SODIUM CHLORIDE 0.9 % (FLUSH) 0.9 %
5-40 SYRINGE (ML) INJECTION EVERY 12 HOURS SCHEDULED
Status: DISCONTINUED | OUTPATIENT
Start: 2025-04-28 | End: 2025-04-28 | Stop reason: HOSPADM

## 2025-04-28 RX ORDER — LIDOCAINE HYDROCHLORIDE 5 MG/ML
INJECTION, SOLUTION INFILTRATION; INTRAVENOUS PRN
Status: DISCONTINUED | OUTPATIENT
Start: 2025-04-28 | End: 2025-04-28 | Stop reason: ALTCHOICE

## 2025-04-28 ASSESSMENT — PAIN - FUNCTIONAL ASSESSMENT
PAIN_FUNCTIONAL_ASSESSMENT: NONE - DENIES PAIN
PAIN_FUNCTIONAL_ASSESSMENT: NONE - DENIES PAIN
PAIN_FUNCTIONAL_ASSESSMENT: 0-10

## 2025-04-28 ASSESSMENT — PAIN DESCRIPTION - DESCRIPTORS: DESCRIPTORS: ACHING;DISCOMFORT

## 2025-04-28 NOTE — H&P
[facility]  [address] [place] [address]  [phone]    Procedure History & Physical      Per Stroud     HPI:    Patient  is here for axial low back pain for bilateral L2,3,4 MBB.  Labs/imaging studies reviewed   All question and concerns addressed including R/B/A associated with the procedure    Past Medical History:   Diagnosis Date    Arthritis     GERD (gastroesophageal reflux disease)     Hyperlipidemia     Hypertension     Hypokalemia     Hypomagnesemia     Low back pain     Neuropathy     Years ago       Past Surgical History:   Procedure Laterality Date    ANESTHESIA NERVE BLOCK Right 01/14/2020    RIGHT L2,3,4 MEDIAL BRANCH BLOCK WITHOUT SEDATION (CPT 71078,68276) performed by Sean Cervantes MD at [facility] OR    ANESTHESIA NERVE BLOCK Right 02/20/2020    RIGHT L2,3,4 MEDIAL BRANCH BLOCK (CPT 61586,80772) performed by Sean Cervantes MD at [facility] OR    ANESTHESIA NERVE BLOCK Left 06/29/2020    LEFT L2 L3 L4 MEDIAL BRANCH BLOCK (CPT 57497) (WANTS AFTERNOON APPOINTMENT) performed by Sean Cervantes MD at [facility] OR    ARM SURGERY Right 02/07/2025    RIGHT ELBOW CUBITAL TUNNEL RELEASE-2/7/25 performed by Dhaval Molina DO at [facility] OR    BACK SURGERY      May 4, 2018 decompression with Dr. Daily.     CARPAL TUNNEL RELEASE Left 01/24/2014    CARPAL TUNNEL RELEASE Left 4/11/2025    LEFT ELBOW CUBITAL TUNNEL RELEASE-4/11/25 performed by Dhaval Molina DO at [facility] OR    CERVICAL FUSION  11/01/2021    COLONOSCOPY      FRACTURE SURGERY Left     arm   age 11    HERNIA REPAIR      x2   once as a  child  & once as an adult    KNEE ARTHROSCOPY Left 02/20/2015    NERVE BLOCK Right 01/14/2020    NERVE BLOCK Right 02/20/2020    L2,3,4 medial     NERVE BLOCK Right 05/04/2020    L2,3,4,5 medial radiofrequency     NERVE BLOCK Left 06/29/2020    medial branch block    PAIN MANAGEMENT PROCEDURE Right 05/04/2020    RIGHT L2, 3, 4 MEDIAL

## 2025-04-28 NOTE — PROGRESS NOTES
Per Stroud is here for followup after left cubital tunnel surgery. Pain is controlled without any medications.. The patient notes improvement in the following symptoms:strength, numbness, pain, sensation.  The patient denies fever, wound drainage, increasing redness, pus, increasing pain, increasing swelling. Post op problems reported: none.         Objective:         General :    alert, appears stated age, and cooperative   Sutures:   Sutures out.   Incision:  healing well, no significant drainage, no dehiscence, no significant erythema   Tenderness:  none   Flexion ROM:  full range of motion   Extension ROM:  full range of motion   Effusion:  mild         Assessment:        Status post left cubital tunnel surgery. Doing well postoperatively.   Well aligned prosthesis without signs of loosening      Plan:     Sutures removed today.  HEP  Follow up: prn  No heavy lifting, pushing, pulling  ROM  Can get incision wet, do not submerge until closed    Chief Complaint   Patient presents with    Knee Pain     Left TKA DOS 04/15/2024    Elbow Pain     Left Elbow Cubital Tunnel Release DOS 04/11/2025       Per Stroud returns today for follow-up of his left TKA. DOS: 4/15/24. He reports this is about the same as when I saw him last. He has trouble with steps. He ambulates with cane for his low back.    Past Medical History:   Diagnosis Date    Arthritis     GERD (gastroesophageal reflux disease)     Hyperlipidemia     Hypertension     Hypokalemia     Hypomagnesemia     Low back pain     Neuropathy     Years ago     Past Surgical History:   Procedure Laterality Date    ANESTHESIA NERVE BLOCK Right 01/14/2020    RIGHT L2,3,4 MEDIAL BRANCH BLOCK WITHOUT SEDATION (CPT 39002,60086) performed by Sean Cervantes MD at [facility] OR    ANESTHESIA NERVE BLOCK Right 02/20/2020    RIGHT L2,3,4 MEDIAL BRANCH BLOCK (CPT 74674,35688) performed by Sean Cervantes MD at [facility] OR    ANESTHESIA NERVE BLOCK Left 06/29/2020

## 2025-04-28 NOTE — DISCHARGE INSTRUCTIONS
[facility]  [phone]   Post-Pain Block/ Radiofrequency Home Going Instructions    1-Go home, rest for the remainder of the day  2-Please do not lift over 20 pounds the day of the injection  3-If you received sedation No: alcohol, driving, operating lawn mowers, plows, tractors or other dangerous equipment until next morning. Do not make important decisions or sign legal documents for 24 hours. You may experience light headedness, dizziness, nausea or sleepiness after sedation. Do not stay alone. A responsible adult must be with you for 24 hours. You could be nauseated from the medications you have received. Your IV site may be sore and bruised.    4-No dietary restrictions     5-Resume all medications the same day, blood thinners to be resumed 24 hours after injection    6-Keep the surgical site clean and dry, you may shower the next morning and remove the      dressing.     7- No sitz baths, tub baths or hot tubs/swimming for 24 hours.       8- If you have any pain at the injection site(s), application of an ice pack to the area should be       helpful, 20 minutes on/20 minutes off for next 48 hours.  9- Call [facility] pain management immediately at if you develop.  Fever greater than 100.4 F  Have bleeding or drainage from the puncture site  Have progressive Leg/arm numbness and or weakness  Loss of control of bowel and or bladder (wet/soil yourself)  Severe headache with inability to lift head  10-You may return to work the next day

## 2025-04-28 NOTE — OP NOTE
2025    Patient: Per Stroud  :  1960  Age:  65 y.o.  Sex:  male     PRE-OPERATIVE DIAGNOSIS: Bilateral Lumbar spondylosis, lumbar facet syndrome.    POST-OPERATIVE DIAGNOSIS: Same.    PROCEDURE:  # 1 Fluoroscopic guided lumbar medial branch blocks Bilateral at Levels: L2,3,4 MBB    SURGEON: Sandy LLAMAS    ANESTHESIA: Local    ESTIMATED BLOOD LOSS: None.  ______________________________________________________________________  BRIEF HISTORY:  Per Stroud comes in today for 1 fluoroscopic guided lumbar medial branch blocks  Bilateral  at Levels: L2,3,4 MB.  The potential complications of this procedure were discussed with him again today. He has elected to undergo the aforementioned procedure.     Per’s complete History & Physical examination were reviewed in depth, a copy of which is in the chart.    DESCRIPTION OF PROCEDURE:   After confirming written and informed consent, a time-out was performed and Per’s name and date of birth, the procedure to be performed as well as the plan for the location of the needle insertion were confirmed.    The patient was brought into the procedure room and placed in the prone position on the fluoroscopy table. Standard monitors were placed and vital signs were observed throughout the procedure. The area of the lumbar spine was prepped with chloraprep and draped in a sterile manner. AP fluoroscopy was used to identify and jeet bartons point at the targeted levels.The skin and subcutaneous tissues in these identified areas were anesthetized with 0.5%Lidocaine. A 22 # gauge 5 inch spinal needle was advanced toward the junction of the superior articular process and the transverse process, along the course of the medial branch. Satisfactory needle placement was confirmed by AP and oblique projections.  At the sacral alar level, the sacral alar region was visualized and the needle tip was positioned on the sacral alar at the base of the superior

## 2025-05-08 ENCOUNTER — SCHEDULED TELEPHONE ENCOUNTER (OUTPATIENT)
Dept: ONCOLOGY | Age: 65
End: 2025-05-08

## 2025-05-08 DIAGNOSIS — G62.9 NEUROPATHY: Primary | ICD-10-CM

## 2025-05-08 DIAGNOSIS — D47.2 MGUS (MONOCLONAL GAMMOPATHY OF UNKNOWN SIGNIFICANCE): ICD-10-CM

## 2025-05-08 NOTE — PROGRESS NOTES
[facility] PHYSICIANS [facility] CARE [facility] MED ONCOLOGY  [address] [address]  Dept: [phone]  Loc: [phone]    Kathryn Narayanan MD   ·   MD Isabel Jenkins APRN  ·  LIZ Tinajero      [place] Office in [address] [address]  P: [phone]  F: [phone] [place] [facility] in [place]  [address] [place] [address]  P: [phone]  F: [phone]  [place] [facility] in [place]  [address] [place] [address]  P: [phone]  F: [phone]                 Hematology/Oncology   Clinic Progress Note              Patient: Per Stroud,  65 y.o. male    Date of Encounter: 05/08/2025     Referring Provider:  Carol Romero MD    Reason for Visit:   Neuropathy, MGUS        PCP:  Cruzito Reynolds DO      Chief Complaint   Patient presents with    Results         Subjective:  No major issues. Here to review results.       HPI from Initial Outpatient Consultation  (4/17/2025):  The patient is a 65 y.o. male comes in for MGUS, in the setting of chronic neuropathy.  The patient has had history of C-spine injury years ago.  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  And had undergone C-spine fusion.  And he also complains of chronic low back issues, in which she has associated right lower extremity tingling.    He also has chronic tingling/neuropathy of both extremities, thought to be associate with ulnar nerve impingement.    Additional surgical history includes right elbow cubital tunnel release on 2/7/2025, and also completed left cubital tunnel release earlier this month by orthopedic surgery.    He follows closely with neurology for his neuropathy, in which she had completed MRI imaging, redemonstrating his chronic findings.  Furthermore paraproteinemia panel was checked, which was positive for a faint/minor band/minute amount of

## 2025-05-13 ENCOUNTER — OFFICE VISIT (OUTPATIENT)
Dept: PAIN MANAGEMENT | Age: 65
End: 2025-05-13
Payer: MEDICARE

## 2025-05-13 VITALS
DIASTOLIC BLOOD PRESSURE: 84 MMHG | SYSTOLIC BLOOD PRESSURE: 122 MMHG | HEART RATE: 74 BPM | OXYGEN SATURATION: 98 % | TEMPERATURE: 97.1 F | HEIGHT: 72 IN | RESPIRATION RATE: 16 BRPM | WEIGHT: 251 LBS | BODY MASS INDEX: 34 KG/M2

## 2025-05-13 DIAGNOSIS — G62.9 NEUROPATHY: ICD-10-CM

## 2025-05-13 DIAGNOSIS — M47.816 LUMBAR SPONDYLOSIS: ICD-10-CM

## 2025-05-13 DIAGNOSIS — M50.90 CERVICAL DISC DISORDER: ICD-10-CM

## 2025-05-13 DIAGNOSIS — D47.2 MGUS (MONOCLONAL GAMMOPATHY OF UNKNOWN SIGNIFICANCE): ICD-10-CM

## 2025-05-13 DIAGNOSIS — M54.16 LUMBAR RADICULOPATHY: ICD-10-CM

## 2025-05-13 DIAGNOSIS — M43.06 LUMBAR SPONDYLOLYSIS: ICD-10-CM

## 2025-05-13 DIAGNOSIS — M51.9 LUMBAR DISC DISORDER: ICD-10-CM

## 2025-05-13 DIAGNOSIS — M47.812 CERVICAL SPONDYLOSIS: ICD-10-CM

## 2025-05-13 DIAGNOSIS — M96.1 LUMBAR POSTLAMINECTOMY SYNDROME: ICD-10-CM

## 2025-05-13 DIAGNOSIS — M53.3 DISORDER OF SACRUM: ICD-10-CM

## 2025-05-13 DIAGNOSIS — M47.812 CERVICAL FACET JOINT SYNDROME: ICD-10-CM

## 2025-05-13 DIAGNOSIS — M47.816 LUMBAR FACET ARTHROPATHY: Primary | ICD-10-CM

## 2025-05-13 PROCEDURE — 99214 OFFICE O/P EST MOD 30 MIN: CPT | Performed by: PAIN MEDICINE

## 2025-05-13 PROCEDURE — 1159F MED LIST DOCD IN RCRD: CPT | Performed by: PAIN MEDICINE

## 2025-05-13 PROCEDURE — G8427 DOCREV CUR MEDS BY ELIG CLIN: HCPCS | Performed by: PAIN MEDICINE

## 2025-05-13 PROCEDURE — 3074F SYST BP LT 130 MM HG: CPT | Performed by: PAIN MEDICINE

## 2025-05-13 PROCEDURE — 1160F RVW MEDS BY RX/DR IN RCRD: CPT | Performed by: PAIN MEDICINE

## 2025-05-13 PROCEDURE — G8417 CALC BMI ABV UP PARAM F/U: HCPCS | Performed by: PAIN MEDICINE

## 2025-05-13 PROCEDURE — 3017F COLORECTAL CA SCREEN DOC REV: CPT | Performed by: PAIN MEDICINE

## 2025-05-13 PROCEDURE — 1123F ACP DISCUSS/DSCN MKR DOCD: CPT | Performed by: PAIN MEDICINE

## 2025-05-13 PROCEDURE — 1036F TOBACCO NON-USER: CPT | Performed by: PAIN MEDICINE

## 2025-05-13 PROCEDURE — 3079F DIAST BP 80-89 MM HG: CPT | Performed by: PAIN MEDICINE

## 2025-05-13 RX ORDER — SODIUM CHLORIDE 0.9 % (FLUSH) 0.9 %
5-40 SYRINGE (ML) INJECTION EVERY 12 HOURS SCHEDULED
OUTPATIENT
Start: 2025-05-13

## 2025-05-13 RX ORDER — SODIUM CHLORIDE 9 MG/ML
INJECTION, SOLUTION INTRAVENOUS PRN
OUTPATIENT
Start: 2025-05-13

## 2025-05-13 RX ORDER — SODIUM CHLORIDE 0.9 % (FLUSH) 0.9 %
5-40 SYRINGE (ML) INJECTION PRN
OUTPATIENT
Start: 2025-05-13

## 2025-05-13 NOTE — PROGRESS NOTES
Per Stroud presents to the [facility] on 5/13/2025. Per is complaining of pain in his lower back. The pain is constant. The pain is described as aching, stabbing, sharp, and penetrating. Pain is rated on his best day at a 3, on his worst day at a 8, and on average at a 5 on the VAS scale. He took his last dose of Baclofen and ibuprofen today.  Discontinued Lyrica d/t side effects.    Any procedures since your last visit: Yes, with 80 % relief for 3-4 days.    He is  on NSAIDS and  is not on anticoagulation medications to include none and is managed by NA.     Pacemaker or defibrillator: No    Medication Contract and Consent for Opioid Use Documents Filed        No documents found                       Resp 16   Ht 1.829 m (6' 0.01\")   Wt 113.9 kg (251 lb)   BMI 34.03 kg/m²      No LMP for male patient.

## 2025-05-13 NOTE — PROGRESS NOTES
[facility]  [address] [place] [address]  [phone]    Follow up Note      Per Stroud     Date of Visit:  5/13/2025    CC:  Patient presents for follow up   Chief Complaint   Patient presents with    Follow-up     Repeat bilateral Lumbar 2,3,4 medial branch block       HPI:  Follow up on his low back pain with no acute issues.  Appropriate analgesia with current medications regimen: yes fair.    Change in quality of symptoms:no.    Medication side effects:none  Recent diagnostic testing:none  Recent interventional procedures:Repeat Bilateral L3,4,5 MBB with more than 75-80% improvement in his pain for three days.    He has not been on anticoagulation medications to include none. The patient  has not been on herbal supplements.  The patient is not diabetic.     Imaging:   Cervical spine MRI 2025  1. Status post ACDF from C4-C6 with corpectomy changes at C5.  2. Redemonstration of myelomalacia from C3-4 to C6.  3. Moderate central canal stenosis at C3-4. Mild to moderate central canal  stenosis at C4-5 and C5-6. Mild central canal stenosis at C6-7.  4. Multilevel neural foraminal stenoses, worst (severe) at bilateral C3-4,  bilateral C4-5, bilateral C5-6 and bilateral C6-7.  5. Enhancement along the supraspinous ligaments from the level of C6 into the  partially visualized thoracic spine is of an unclear etiology.  It may be the  result of injury, degenerative changes or an infectious/inflammatory process.    Lumbar spine MRI 2024  T12-L1: The previously described cystic space occupying lesion in the   left lateral epidural space has essentially resolved. There is persistent   mild right and mild to moderate left facet hypertrophy. Previously noted   canal stenosis as essentially resolved. There is a persistent disc bulge   extending into the inferior neural foramina. There is stable mild to   moderate left-sided facet hypertrophy. There is persistent

## 2025-05-17 LAB — MYELIN ASSOC. GLYCOP: <1000 TU (ref 0–999)

## 2025-05-24 ENCOUNTER — HOSPITAL ENCOUNTER (OUTPATIENT)
Age: 65
Discharge: HOME | End: 2025-05-26
Payer: MEDICARE

## 2025-05-24 ENCOUNTER — HOSPITAL ENCOUNTER (OUTPATIENT)
Dept: GENERAL RADIOLOGY | Age: 65
Discharge: HOME | End: 2025-05-26
Payer: MEDICARE

## 2025-05-24 DIAGNOSIS — Z98.890 S/P LAMINECTOMY: ICD-10-CM

## 2025-05-24 DIAGNOSIS — Z98.1 ADJACENT SEGMENT DISEASE OF HIGH CERVICAL REGION OF SPINE WITH HISTORY OF FUSION PROCEDURE: ICD-10-CM

## 2025-05-24 DIAGNOSIS — M50.31 ADJACENT SEGMENT DISEASE OF HIGH CERVICAL REGION OF SPINE WITH HISTORY OF FUSION PROCEDURE: ICD-10-CM

## 2025-05-24 PROCEDURE — 72081 X-RAY EXAM ENTIRE SPI 1 VW: CPT

## 2025-05-30 ENCOUNTER — TELEPHONE (OUTPATIENT)
Age: 65
End: 2025-05-30

## 2025-05-30 NOTE — TELEPHONE ENCOUNTER
Call to Per Stroud that procedure was scheduled for 06/09/2025 and that [facility] should call him a few days before for the pre op call and after 3:00 PM the business day before with the arrival time. Instructed to call office back if any questions. Per verbalized understanding.    Electronically signed by Michaela Bryson RN on 5/30/2025 at 1:20 PM

## 2025-06-05 ENCOUNTER — ANESTHESIA EVENT (OUTPATIENT)
Dept: OPERATING ROOM | Age: 65
End: 2025-06-05
Payer: COMMERCIAL

## 2025-06-05 NOTE — ANESTHESIA PRE PROCEDURE
facility-administered medications for this encounter.     Current Outpatient Medications   Medication Sig Dispense Refill    potassium chloride (K-TAB) 20 MEQ TBCR extended release tablet Take 1 tablet by mouth daily      furosemide (LASIX) 20 MG tablet Take 1 tablet by mouth as needed      baclofen (LIORESAL) 10 MG tablet Take 1 tablet by mouth 3 times daily 90 tablet 3    tamsulosin (FLOMAX) 0.4 MG capsule Take 1 capsule by mouth in the morning and 1 capsule in the evening.      magnesium oxide (MAG-OX) 400 (240 Mg) MG tablet Take 1 tablet by mouth in the morning, at noon, in the evening, and at bedtime      vitamin D (CHOLECALCIFEROL) 125 MCG (5000 UT) CAPS capsule Take 1 capsule by mouth daily      B Complex-C-Folic Acid (SM B SUPER VITAMIN COMPLEX PO) Take by mouth daily      ibuprofen (ADVIL;MOTRIN) 800 MG tablet Take 1 tablet by mouth every 8 hours as needed for Pain      losartan (COZAAR) 100 MG tablet Take 1 tablet by mouth daily (Patient taking differently: Take 0.5 tablets by mouth daily) 30 tablet 3    Lansoprazole (PREVACID PO) Take 30 mg by mouth daily AM      ezetimibe (ZETIA) 10 MG tablet Take 1 tablet by mouth daily      fenofibrate (TRICOR) 145 MG tablet Take 1 tablet by mouth daily  2       Allergies:  No Known Allergies    Problem List:    Patient Active Problem List   Diagnosis Code    Carpal tunnel syndrome G56.00    Wrist pain M25.539    Localized osteoarthrosis, lower leg M17.10    Medial meniscus tear S83.249A    Synovitis M65.90    DJD (degenerative joint disease) of knee M17.9    Cellulitis L03.90    Hypertension I10    Hyperlipidemia E78.5    Hypokalemia E87.6    Hypomagnesemia E83.42    Left arm cellulitis L03.114    Chronic pain syndrome G89.4    Lumbar facet arthropathy M47.816    Lumbar radiculopathy M54.16    Lumbar spondylosis M47.816    Lumbar disc disorder M51.9    Lumbar postlaminectomy syndrome M96.1    Shoulder impingement, left M25.812    Biceps tendon tear S46.219A

## 2025-06-06 ENCOUNTER — TELEPHONE (OUTPATIENT)
Age: 65
End: 2025-06-06

## 2025-06-09 ENCOUNTER — HOSPITAL ENCOUNTER (OUTPATIENT)
Age: 65
Setting detail: OUTPATIENT SURGERY
Discharge: HOME | End: 2025-06-09
Attending: PAIN MEDICINE | Admitting: PAIN MEDICINE
Payer: COMMERCIAL

## 2025-06-09 ENCOUNTER — ANESTHESIA (OUTPATIENT)
Dept: OPERATING ROOM | Age: 65
End: 2025-06-09
Payer: COMMERCIAL

## 2025-06-09 ENCOUNTER — HOSPITAL ENCOUNTER (OUTPATIENT)
Dept: OPERATING ROOM | Age: 65
Setting detail: OUTPATIENT SURGERY
Discharge: HOME | End: 2025-06-09
Attending: PAIN MEDICINE
Payer: COMMERCIAL

## 2025-06-09 VITALS
RESPIRATION RATE: 16 BRPM | SYSTOLIC BLOOD PRESSURE: 127 MMHG | OXYGEN SATURATION: 94 % | TEMPERATURE: 97 F | HEART RATE: 86 BPM | WEIGHT: 252.8 LBS | DIASTOLIC BLOOD PRESSURE: 89 MMHG | HEIGHT: 72 IN | BODY MASS INDEX: 34.24 KG/M2

## 2025-06-09 DIAGNOSIS — M47.896 OTHER OSTEOARTHRITIS OF SPINE, LUMBAR REGION: ICD-10-CM

## 2025-06-09 PROCEDURE — 7100000011 HC PHASE II RECOVERY - ADDTL 15 MIN: Performed by: PAIN MEDICINE

## 2025-06-09 PROCEDURE — 3600000005 HC SURGERY LEVEL 5 BASE: Performed by: PAIN MEDICINE

## 2025-06-09 PROCEDURE — 2709999900 HC NON-CHARGEABLE SUPPLY: Performed by: PAIN MEDICINE

## 2025-06-09 PROCEDURE — 64636 DESTROY L/S FACET JNT ADDL: CPT | Performed by: PAIN MEDICINE

## 2025-06-09 PROCEDURE — 2580000003 HC RX 258: Performed by: ANESTHESIOLOGY

## 2025-06-09 PROCEDURE — 6360000002 HC RX W HCPCS: Performed by: NURSE ANESTHETIST, CERTIFIED REGISTERED

## 2025-06-09 PROCEDURE — 3700000000 HC ANESTHESIA ATTENDED CARE: Performed by: PAIN MEDICINE

## 2025-06-09 PROCEDURE — 7100000010 HC PHASE II RECOVERY - FIRST 15 MIN: Performed by: PAIN MEDICINE

## 2025-06-09 PROCEDURE — 3700000001 HC ADD 15 MINUTES (ANESTHESIA): Performed by: PAIN MEDICINE

## 2025-06-09 PROCEDURE — 6360000002 HC RX W HCPCS: Performed by: PAIN MEDICINE

## 2025-06-09 PROCEDURE — 3600000015 HC SURGERY LEVEL 5 ADDTL 15MIN: Performed by: PAIN MEDICINE

## 2025-06-09 PROCEDURE — 64635 DESTROY LUMB/SAC FACET JNT: CPT | Performed by: PAIN MEDICINE

## 2025-06-09 RX ORDER — LIDOCAINE HYDROCHLORIDE 20 MG/ML
INJECTION, SOLUTION EPIDURAL; INFILTRATION; INTRACAUDAL; PERINEURAL PRN
Status: DISCONTINUED | OUTPATIENT
Start: 2025-06-09 | End: 2025-06-09 | Stop reason: ALTCHOICE

## 2025-06-09 RX ORDER — MIDAZOLAM HYDROCHLORIDE 1 MG/ML
INJECTION, SOLUTION INTRAMUSCULAR; INTRAVENOUS
Status: DISCONTINUED | OUTPATIENT
Start: 2025-06-09 | End: 2025-06-09 | Stop reason: SDUPTHER

## 2025-06-09 RX ORDER — SODIUM CHLORIDE, SODIUM LACTATE, POTASSIUM CHLORIDE, CALCIUM CHLORIDE 600; 310; 30; 20 MG/100ML; MG/100ML; MG/100ML; MG/100ML
INJECTION, SOLUTION INTRAVENOUS CONTINUOUS
Status: DISCONTINUED | OUTPATIENT
Start: 2025-06-09 | End: 2025-06-09 | Stop reason: HOSPADM

## 2025-06-09 RX ORDER — SODIUM CHLORIDE 9 MG/ML
INJECTION, SOLUTION INTRAVENOUS PRN
Status: DISCONTINUED | OUTPATIENT
Start: 2025-06-09 | End: 2025-06-09 | Stop reason: HOSPADM

## 2025-06-09 RX ORDER — SODIUM CHLORIDE 0.9 % (FLUSH) 0.9 %
5-40 SYRINGE (ML) INJECTION PRN
Status: DISCONTINUED | OUTPATIENT
Start: 2025-06-09 | End: 2025-06-09 | Stop reason: HOSPADM

## 2025-06-09 RX ORDER — BUPIVACAINE HYDROCHLORIDE 2.5 MG/ML
INJECTION, SOLUTION EPIDURAL; INFILTRATION; INTRACAUDAL; PERINEURAL PRN
Status: DISCONTINUED | OUTPATIENT
Start: 2025-06-09 | End: 2025-06-09 | Stop reason: ALTCHOICE

## 2025-06-09 RX ORDER — SODIUM CHLORIDE 0.9 % (FLUSH) 0.9 %
5-40 SYRINGE (ML) INJECTION EVERY 12 HOURS SCHEDULED
Status: DISCONTINUED | OUTPATIENT
Start: 2025-06-09 | End: 2025-06-09 | Stop reason: HOSPADM

## 2025-06-09 RX ORDER — FENTANYL CITRATE 50 UG/ML
INJECTION, SOLUTION INTRAMUSCULAR; INTRAVENOUS
Status: DISCONTINUED | OUTPATIENT
Start: 2025-06-09 | End: 2025-06-09 | Stop reason: SDUPTHER

## 2025-06-09 RX ADMIN — MIDAZOLAM 2 MG: 1 INJECTION INTRAMUSCULAR; INTRAVENOUS at 09:38

## 2025-06-09 RX ADMIN — SODIUM CHLORIDE, POTASSIUM CHLORIDE, SODIUM LACTATE AND CALCIUM CHLORIDE: 600; 310; 30; 20 INJECTION, SOLUTION INTRAVENOUS at 08:47

## 2025-06-09 RX ADMIN — FENTANYL CITRATE 50 MCG: 50 INJECTION, SOLUTION INTRAMUSCULAR; INTRAVENOUS at 09:43

## 2025-06-09 RX ADMIN — FENTANYL CITRATE 50 MCG: 50 INJECTION, SOLUTION INTRAMUSCULAR; INTRAVENOUS at 09:40

## 2025-06-09 ASSESSMENT — PAIN - FUNCTIONAL ASSESSMENT
PAIN_FUNCTIONAL_ASSESSMENT: NONE - DENIES PAIN
PAIN_FUNCTIONAL_ASSESSMENT: 0-10

## 2025-06-09 NOTE — H&P
[facility]  [address] [place] [address]  [phone]    Procedure History & Physical      Per Stroud     HPI:    Patient  is here for axial low back pain for bilateral L3,4,5 MB RFA.  Labs/imaging studies reviewed   All question and concerns addressed including R/B/A associated with the procedure    Past Medical History:   Diagnosis Date    Arthritis     GERD (gastroesophageal reflux disease)     Hyperlipidemia     Hypertension     Hypokalemia     Hypomagnesemia     Low back pain     Neuropathy     Years ago       Past Surgical History:   Procedure Laterality Date    ANESTHESIA NERVE BLOCK Right 01/14/2020    RIGHT L2,3,4 MEDIAL BRANCH BLOCK WITHOUT SEDATION (CPT 75642,61412) performed by Sean Cervantes MD at [facility] OR    ANESTHESIA NERVE BLOCK Right 02/20/2020    RIGHT L2,3,4 MEDIAL BRANCH BLOCK (CPT 78164,31799) performed by Sean Cervantes MD at [facility] OR    ANESTHESIA NERVE BLOCK Left 06/29/2020    LEFT L2 L3 L4 MEDIAL BRANCH BLOCK (CPT 96948) (WANTS AFTERNOON APPOINTMENT) performed by Sean Cervantes MD at [facility] OR    ARM SURGERY Right 02/07/2025    RIGHT ELBOW CUBITAL TUNNEL RELEASE-2/7/25 performed by Dhaval Molina DO at [facility] OR    BACK SURGERY      May 4, 2018 decompression with Dr. Daily.     CARPAL TUNNEL RELEASE Left 01/24/2014    CARPAL TUNNEL RELEASE Left 4/11/2025    LEFT ELBOW CUBITAL TUNNEL RELEASE-4/11/25 performed by Dhaval Molina DO at [facility] OR    CERVICAL FUSION  11/01/2021    COLONOSCOPY      FRACTURE SURGERY Left     arm   age 11    HERNIA REPAIR      x2   once as a  child  & once as an adult    KNEE ARTHROSCOPY Left 02/20/2015    NERVE BLOCK Right 01/14/2020    NERVE BLOCK Right 02/20/2020    L2,3,4 medial     NERVE BLOCK Right 05/04/2020    L2,3,4,5 medial radiofrequency     NERVE BLOCK Left 06/29/2020    medial branch block    PAIN MANAGEMENT PROCEDURE Right 05/04/2020    RIGHT L2, 3, 4 MEDIAL  Current or Ex-Partner: No     Emotionally Abused: No     Physically Abused: No     Sexually Abused: No   Housing Stability: Not on file       Family History   Problem Relation Age of Onset    Heart Disease Father     Heart Disease Maternal Uncle     No Known Problems Mother          REVIEW OF SYSTEMS:    CONSTITUTIONAL:  negative for  fevers, chills, sweats and fatigue    RESPIRATORY:  negative for  dry cough, cough with sputum, dyspnea, wheezing and chest pain    CARDIOVASCULAR:  negative for chest pain, dyspnea, palpitations, syncope    GASTROINTESTINAL:  negative for nausea, vomiting, change in bowel habits, diarrhea, constipation and abdominal pain    MUSCULOSKELETAL: negative for muscle weakness    SKIN: negative for itching or rashes.    BEHAVIOR/PSYCH:  negative for poor appetite, increased appetite, decreased sleep and poor concentration    All other systems negative      PHYSICAL EXAM:    VITALS:  BP (!) 132/95   Pulse 80   Temp 97 °F (36.1 °C) (Temporal)   Resp 16   Ht 1.829 m (6')   Wt 114.7 kg (252 lb 12.8 oz)   SpO2 95%   BMI 34.29 kg/m²     CONSTITUTIONAL:  awake, alert, cooperative, no apparent distress, and appears stated age    EYES: PERRLA, EOMI    LUNGS:  No increased work of breathing, no audible wheezing    CARDIOVASCULAR:  regular rate and rhythm    ABDOMEN:  Soft non tender non distended     EXTREMITIES: no signs of clubbing or cyanosis.    MUSCULOSKELETAL: negative for flaccid muscle tone or spastic movements.    SKIN: gross examination reveals no signs of rashes, or diaphoresis.    NEURO: Cranial nerves II-XII grossly intact. No signs of agitated mood.       Assessment/Plan:    Axial low back pain for bilateral L3,,4,5 MB RFA.

## 2025-06-09 NOTE — OP NOTE
2025    Patient: Per Stroud  :  1960  Age:  65 y.o.  Sex:  male     PRE-OPERATIVE DIAGNOSIS: Bilateral Lumbar spondylosis, lumbar facet arthropathy.    POST-OPERATIVE DIAGNOSIS: Same.    PROCEDURE:  Fluoroscopic-guided Bilateral  Lumbar facet medial branch radiofrequency ablation at levels L3,4,5 MB.    SURGEON:  FARHAD Delgado    ANESTHESIA: MAC    ESTIMATED BLOOD LOSS: None.  ______________________________________________________________________  HISTORY & PHYSICAL: Per Stroud presents today for fluoroscopic-guided Bilateral lumbar facet medial branch radiofrequency ablation at levels L3,4,5 Mb. The potential complications of the procedure were explained to Per again today and he has elected to undergo the aforementioned procedure.    Per’s complete History & Physical examination were reviewed in depth, a copy of which is in the chart.      DESCRIPTION OF PROCEDURE:    After confirming written and informed consent, a time-out was performed and Per’s name and date of birth, the procedure to be performed as well as the plan for the location of the needle insertion were confirmed.    The patient was brought into the procedure room and placed in the prone position on the fluoroscopy table. Standard monitors were placed and vital signs were observed throughout the procedure. The area of the lumbar spine and upper buttocks was sterilely prepped with chloraprep and draped in a sterile manner. AP fluoroscopy was used to identify and jeet bartons point at the targeted area. A 22 gauge 10 mm radiofrequency probe was advanced toward each of these points. Once bone was contacted, negative aspiration for blood and CSF was confirmed, sensory stimulation was performed at 50 Hz and at 0.4 volts generating a pressure sensation. Motor stimulation < 2.0 volts elicited multifidus twitching without any radicular symptoms. 1 ml of 2% lidocaine was injected prior to lesioning, which was performed for 90  seconds at 90 degrees centigrade. Once the lesions were complete, a solution of 0.25% marcaine 3 cc and 40 mg DepoMedrol was injected and distributed equally through each probe. The probes were removed . The patient's back was cleaned and bandages were placed over the needle insertion sites.    Disposition the patient tolerated the procedure well and there were no complications . Vital signs remained stable throughout the procedure. The patient was escorted to the recovery area where they remained until discharge and written discharge instructions for the procedure were given.    Plan: Per will return to our pain management center as scheduled.     RICHARD DAVIS MD

## 2025-06-09 NOTE — DISCHARGE INSTRUCTIONS
[facility] Pain Management Department  [phone]   Post-Pain Block/ Radiofrequency Home Going Instructions    1-Go home, rest for the remainder of the day  2-Please do not lift over 20 pounds the day of the injection  3-If you received sedation No: alcohol, driving, operating lawn mowers, plows, tractors or other dangerous equipment until next morning. Do not make important decisions or sign legal documents for 24 hours. You may experience light headedness, dizziness, nausea or sleepiness after sedation. Do not stay alone. A responsible adult must be with you for 24 hours. You could be nauseated from the medications you have received. Your IV site may be sore and bruised.    4-No dietary restrictions     5-Resume all medications the same day, blood thinners to be resumed 24 hours after injection    6-Keep the surgical site clean and dry, you may shower the next morning and remove the      dressing.     7- No sitz baths, tub baths or hot tubs/swimming for 24 hours.       8- If you have any pain at the injection site(s), application of an ice pack to the area should be       helpful, 20 minutes on/20 minutes off for next 48 hours.  9- Call [facility] pain management immediately at if you develop.  Fever greater than 100.4 F  Have bleeding or drainage from the puncture site  Have progressive Leg/arm numbness and or weakness  Loss of control of bowel and or bladder (wet/soil yourself)  Severe headache with inability to lift head  10-You may return to work the next day         Infection After Surgery: Care Instructions  Overview  After surgery, an infection is always possible. It doesn't mean that the surgery didn't go well.  Because an infection can be serious, your doctor has taken steps to manage it.  Your doctor checked the infection and cleaned it if necessary. Your doctor may have made an opening in the area so that the pus can drain out. You may have gauze in the cut so that the area will stay open and keep

## 2025-06-09 NOTE — ANESTHESIA POSTPROCEDURE EVALUATION
Department of Anesthesiology  Postprocedure Note    Patient: Per Stroud  MRN: [id number]  YOB: 1960  Date of evaluation: 6/9/2025    Procedure Summary       Date: 06/09/25 Room / Location: [address]    Anesthesia Start: 0935 Anesthesia Stop: 1011    Procedure: Bilateral Lumbar 3,4,5 medial branch radiofrequency ablation. SEDATION (Bilateral: Back) Diagnosis:       Lumbar spondylosis      (Lumbar spondylosis [M47.816])    Surgeons: Sean Cervantes MD Responsible Provider: Viktoria Covington MD    Anesthesia Type: MAC ASA Status: 3            Anesthesia Type: MAC    Nemesio Phase I: Nemesio Score: 10    Nemesio Phase II: Nemesio Score: 10    Anesthesia Post Evaluation    Patient location during evaluation: PACU  Patient participation: complete - patient participated  Level of consciousness: awake and alert  Airway patency: patent  Nausea & Vomiting: no nausea and no vomiting  Cardiovascular status: hemodynamically stable  Respiratory status: acceptable  Hydration status: euvolemic  Pain management: satisfactory to patient    No notable events documented.

## 2025-06-19 ENCOUNTER — OFFICE VISIT (OUTPATIENT)
Age: 65
End: 2025-06-19
Payer: COMMERCIAL

## 2025-06-19 VITALS
TEMPERATURE: 96.8 F | SYSTOLIC BLOOD PRESSURE: 100 MMHG | OXYGEN SATURATION: 94 % | RESPIRATION RATE: 16 BRPM | HEIGHT: 72 IN | WEIGHT: 252 LBS | HEART RATE: 87 BPM | DIASTOLIC BLOOD PRESSURE: 64 MMHG | BODY MASS INDEX: 34.13 KG/M2

## 2025-06-19 DIAGNOSIS — M47.812 CERVICAL FACET JOINT SYNDROME: ICD-10-CM

## 2025-06-19 DIAGNOSIS — M50.90 CERVICAL DISC DISORDER: ICD-10-CM

## 2025-06-19 DIAGNOSIS — M43.06 LUMBAR SPONDYLOLYSIS: ICD-10-CM

## 2025-06-19 DIAGNOSIS — M47.816 LUMBAR FACET ARTHROPATHY: Primary | ICD-10-CM

## 2025-06-19 DIAGNOSIS — M47.816 LUMBAR SPONDYLOSIS: ICD-10-CM

## 2025-06-19 DIAGNOSIS — M51.9 LUMBAR DISC DISORDER: ICD-10-CM

## 2025-06-19 DIAGNOSIS — M48.02 CERVICAL STENOSIS OF SPINE: ICD-10-CM

## 2025-06-19 DIAGNOSIS — M53.3 DISORDER OF SACRUM: ICD-10-CM

## 2025-06-19 DIAGNOSIS — M54.16 LUMBAR RADICULOPATHY: ICD-10-CM

## 2025-06-19 DIAGNOSIS — M47.812 CERVICAL SPONDYLOSIS: ICD-10-CM

## 2025-06-19 PROCEDURE — 3017F COLORECTAL CA SCREEN DOC REV: CPT | Performed by: PAIN MEDICINE

## 2025-06-19 PROCEDURE — G8427 DOCREV CUR MEDS BY ELIG CLIN: HCPCS | Performed by: PAIN MEDICINE

## 2025-06-19 PROCEDURE — 1036F TOBACCO NON-USER: CPT | Performed by: PAIN MEDICINE

## 2025-06-19 PROCEDURE — 3074F SYST BP LT 130 MM HG: CPT | Performed by: PAIN MEDICINE

## 2025-06-19 PROCEDURE — 99213 OFFICE O/P EST LOW 20 MIN: CPT | Performed by: PAIN MEDICINE

## 2025-06-19 PROCEDURE — 3078F DIAST BP <80 MM HG: CPT | Performed by: PAIN MEDICINE

## 2025-06-19 PROCEDURE — G8417 CALC BMI ABV UP PARAM F/U: HCPCS | Performed by: PAIN MEDICINE

## 2025-06-19 PROCEDURE — 1123F ACP DISCUSS/DSCN MKR DOCD: CPT | Performed by: PAIN MEDICINE

## 2025-06-19 RX ORDER — OXYCODONE HYDROCHLORIDE 5 MG/1
5 TABLET ORAL EVERY 6 HOURS
COMMUNITY
Start: 2025-05-28

## 2025-06-19 NOTE — PROGRESS NOTES
[facility]  [address], [place] [address]  [phone]    Follow up Note      Per Waynejose     Date of Visit:  6/19/2025    CC:  Patient presents for follow up   Chief Complaint   Patient presents with    Follow-up     Bilateral Lumbar 3,4,5 medial branch radiofrequency ablation.       HPI:  Follow up on his low back pain with no acute issues.  Appropriate analgesia with current medications regimen: yes fair.    Change in quality of symptoms:no.    Medication side effects:none  Recent diagnostic testing:none  Recent interventional procedures:Bilateral L2,3,4 MB RFA with slight improvement in his pain    He has not been on anticoagulation medications to include none. The patient  has not been on herbal supplements.  The patient is not diabetic.     Imaging:   Cervical spine MRI 2025  1. Status post ACDF from C4-C6 with corpectomy changes at C5.  2. Redemonstration of myelomalacia from C3-4 to C6.  3. Moderate central canal stenosis at C3-4. Mild to moderate central canal  stenosis at C4-5 and C5-6. Mild central canal stenosis at C6-7.  4. Multilevel neural foraminal stenoses, worst (severe) at bilateral C3-4,  bilateral C4-5, bilateral C5-6 and bilateral C6-7.  5. Enhancement along the supraspinous ligaments from the level of C6 into the  partially visualized thoracic spine is of an unclear etiology.  It may be the  result of injury, degenerative changes or an infectious/inflammatory process.    CT cervical spine 04/2025  Postoperative changes of ACDF from C4 to C6 with solid osseous   incorporation of the vertebral bodies.     At C3-C4 there is moderate to severe stenosis of the canal and severe   narrowing of the neural foramina.     At C4-C5 there is moderate stenosis of the spinal canal and severe   narrowing of the neural foramina.     Lumbar spine MRI 2025  1.  Postsurgical changes, prior L4-L5 discectomy and disc space grafting   along with posterior

## 2025-06-19 NOTE — PROGRESS NOTES
Per Stroud presents to the [facility] on 6/19/2025. Per is complaining of pain in his lower back. The pain is constant. The pain is described as aching, stabbing, sharp, and penetrating. Pain is rated on his best day at a 6, on his worst day at a 7, and on average at a 6 on the VAS scale. He took his last dose of Motrin and Baclofen today.      Any procedures since your last visit: Yes, with little relief, only 2 weeks post procedure    He is  on NSAIDS and  is not on anticoagulation medications to include none and is managed by NA.     Pacemaker or defibrillator: No     Medication Contract and Consent for Opioid Use Documents Filed        No documents found                       Resp 16   Ht 1.829 m (6' 0.01\")   Wt 114.3 kg (252 lb)   BMI 34.17 kg/m²      No LMP for male patient.

## 2025-07-28 ENCOUNTER — APPOINTMENT (OUTPATIENT)
Dept: ORTHOPEDIC SURGERY | Facility: CLINIC | Age: 65
End: 2025-07-28
Payer: MEDICARE

## 2025-07-28 VITALS — BODY MASS INDEX: 33.59 KG/M2 | HEIGHT: 72 IN | WEIGHT: 248 LBS

## 2025-07-28 DIAGNOSIS — Z98.1 HISTORY OF SPINAL FUSION: ICD-10-CM

## 2025-07-28 DIAGNOSIS — M54.12 CERVICAL RADICULOPATHY: ICD-10-CM

## 2025-07-28 PROCEDURE — 99215 OFFICE O/P EST HI 40 MIN: CPT | Performed by: ORTHOPAEDIC SURGERY

## 2025-07-28 PROCEDURE — 3008F BODY MASS INDEX DOCD: CPT | Performed by: ORTHOPAEDIC SURGERY

## 2025-07-28 RX ORDER — LOSARTAN POTASSIUM 50 MG/1
50 TABLET ORAL
COMMUNITY

## 2025-07-28 RX ORDER — LANOLIN ALCOHOL/MO/W.PET/CERES
1 CREAM (GRAM) TOPICAL 4 TIMES DAILY PRN
COMMUNITY

## 2025-07-28 RX ORDER — POTASSIUM CHLORIDE 20 MEQ/1
20 TABLET, EXTENDED RELEASE ORAL DAILY
COMMUNITY

## 2025-07-28 RX ORDER — IBUPROFEN 800 MG/1
800 TABLET, FILM COATED ORAL 3 TIMES DAILY
COMMUNITY

## 2025-07-28 RX ORDER — FENOFIBRATE 145 MG/1
145 TABLET, FILM COATED ORAL DAILY
COMMUNITY

## 2025-07-29 NOTE — PROGRESS NOTES
Ronaldo and his wife return for the first time in quite a while.  He is a 65-year-old man who we know quite well from both prior cervical and lumbar spine surgery.  He does have a complex history.    In 2018, he underwent a lumbar decompression for severe stenosis and he did improve with regard to his lumbar radiculopathy and claudication.    In 2021, he had urgent anterior cervical decompression and fusion with a C5 corpectomy for quite severe cervical myelopathy.  He did have some initial improvement in his radicular symptoms in his hands.  However he did develop worsening left shoulder function following the surgery and he continues to have weakness and limited motion in his left shoulder.    He did have a left shoulder surgical procedure in 2024 that sounds by his description as a tenodesis.    He has had bilateral ulnar nerve releases earlier this year without relief.    Currently he describes increasingly severe bilateral arm pain numbness and weakness.    He describes himself as a limited community ambulator.  He does experience significant chronic low back pain.  He has not experiencing any severe radicular symptoms.    His general health has been stable.    On exam, he is a very pleasant older appearing man.  He is markedly obese with a body mass index of 33.    He has a slow deliberate gait.    He has painless although limited motion of the cervical spine.  Negative Lhermitte's.    He does have limited abduction of his left shoulder.  Forward flexion to 60 degrees.    He does have weakness in his left bicep, 3/5 and deltoid 3/5.  Distally in wrist extension finger extension and intrinsics 4/5.    On the right, deltoid bicep tricep 4/5 wrist extension 4/5 finger extension and intrinsics 3/5.  Negative Tara's.    His lower extremity strength appears to be intact.  Painless motion both hips.    We reviewed multiple studies that he brings with him.  They are on several desks but we have been able to upload  "them to PACS under different file names, all with his last name.    CT scan shows that his prior cervical corpectomy is solidly healed.  He does have degenerative changes at C3-4 and C6-7. His cervical MRI shows severe stenosis at C3-4.  This is progressed since his prior study.    Plain film of his lumbar spine shows that he has developed a more significant coronal plane deformity, above his prior laminectomies L3-5.  He has a thoracolumbar scoliosis apex at L2-3.  The disc space at L2-3 is quite degenerative.  There is no obvious instability.    His lumbar MRI shows mild stenosis at L2-3.  There is no recurrent stenosis at his prior laminectomy levels.    Impression: Ronaldo is significantly disabled with chronic neck pain and chronic low back pain but definitely has ongoing symptoms of upper extremity impairment.  I think the weakness in his arms are multifactorial.  Certainly he has underlying left shoulder issues.  He has chronic neuropathic type symptoms in his hands.  However he has developed more progressive cervical stenosis at C3-4.    We have reviewed his imaging at length with he and his wife and discussed his presentation.  The degree of stenosis at C3-4 would warrant consideration of further surgery, likely in the form of a posterior decompression and fusion.  However, it would be very difficult to predict the likelihood for significant improvement.  He has had prior surgery that really did not help him, despite the urgent nature it was performed.  He has also had peripheral nerve entrapment surgery again that really did not help him.    As such, we discussed the 2 approaches that would be available.  1 would be to continue a nonoperative approach with the use of a walker and a conditioning program and physical therapy and close observation for any progression.  If he took this \"watch and wait\" approach, I would like to reevaluate him in 6 to 8 weeks, or certainly sooner if his symptoms were to " worsen.    The other option would be to consider surgery in the form of a posterior cervical decompression and fusion.    Both he and his wife appreciates the discussion.  They are going to consider things.  His wife would like him to do some additional physical therapy which we will refer him for.  We will plan to see him back roughly in 6 to 8 weeks for a repeat check, or certainly sooner if his symptoms change.    ** Dictated with voice recognition software and not immediately reviewed for errors in grammar and/or spelling **

## 2025-08-19 ENCOUNTER — OFFICE VISIT (OUTPATIENT)
Age: 65
End: 2025-08-19
Payer: COMMERCIAL

## 2025-08-19 VITALS
WEIGHT: 252 LBS | RESPIRATION RATE: 18 BRPM | SYSTOLIC BLOOD PRESSURE: 128 MMHG | HEIGHT: 72 IN | HEART RATE: 79 BPM | OXYGEN SATURATION: 96 % | TEMPERATURE: 97.1 F | BODY MASS INDEX: 34.13 KG/M2 | DIASTOLIC BLOOD PRESSURE: 68 MMHG

## 2025-08-19 DIAGNOSIS — M50.90 CERVICAL DISC DISORDER: ICD-10-CM

## 2025-08-19 DIAGNOSIS — M54.16 LUMBAR RADICULOPATHY: ICD-10-CM

## 2025-08-19 DIAGNOSIS — M47.816 LUMBAR SPONDYLOSIS: ICD-10-CM

## 2025-08-19 DIAGNOSIS — M51.9 LUMBAR DISC DISORDER: ICD-10-CM

## 2025-08-19 DIAGNOSIS — M43.06 LUMBAR SPONDYLOLYSIS: ICD-10-CM

## 2025-08-19 DIAGNOSIS — M47.812 CERVICAL SPONDYLOSIS: ICD-10-CM

## 2025-08-19 DIAGNOSIS — M47.812 CERVICAL FACET JOINT SYNDROME: ICD-10-CM

## 2025-08-19 DIAGNOSIS — M48.02 CERVICAL STENOSIS OF SPINE: ICD-10-CM

## 2025-08-19 DIAGNOSIS — M53.3 DISORDER OF SACRUM: ICD-10-CM

## 2025-08-19 DIAGNOSIS — M47.816 LUMBAR FACET ARTHROPATHY: Primary | ICD-10-CM

## 2025-08-19 PROCEDURE — 1123F ACP DISCUSS/DSCN MKR DOCD: CPT | Performed by: PAIN MEDICINE

## 2025-08-19 PROCEDURE — G8417 CALC BMI ABV UP PARAM F/U: HCPCS | Performed by: PAIN MEDICINE

## 2025-08-19 PROCEDURE — G8427 DOCREV CUR MEDS BY ELIG CLIN: HCPCS | Performed by: PAIN MEDICINE

## 2025-08-19 PROCEDURE — 3017F COLORECTAL CA SCREEN DOC REV: CPT | Performed by: PAIN MEDICINE

## 2025-08-19 PROCEDURE — 3074F SYST BP LT 130 MM HG: CPT | Performed by: PAIN MEDICINE

## 2025-08-19 PROCEDURE — 3078F DIAST BP <80 MM HG: CPT | Performed by: PAIN MEDICINE

## 2025-08-19 PROCEDURE — 1036F TOBACCO NON-USER: CPT | Performed by: PAIN MEDICINE

## 2025-08-19 PROCEDURE — 99214 OFFICE O/P EST MOD 30 MIN: CPT | Performed by: PAIN MEDICINE

## 2025-08-19 RX ORDER — SODIUM CHLORIDE 0.9 % (FLUSH) 0.9 %
5-40 SYRINGE (ML) INJECTION PRN
OUTPATIENT
Start: 2025-08-19

## 2025-08-19 RX ORDER — SODIUM CHLORIDE 9 MG/ML
INJECTION, SOLUTION INTRAVENOUS PRN
OUTPATIENT
Start: 2025-08-19

## 2025-08-19 RX ORDER — SODIUM CHLORIDE 0.9 % (FLUSH) 0.9 %
5-40 SYRINGE (ML) INJECTION EVERY 12 HOURS SCHEDULED
OUTPATIENT
Start: 2025-08-19

## 2025-10-01 ENCOUNTER — APPOINTMENT (OUTPATIENT)
Dept: ORTHOPEDIC SURGERY | Facility: CLINIC | Age: 65
End: 2025-10-01
Payer: MEDICARE

## 2025-10-15 ENCOUNTER — APPOINTMENT (OUTPATIENT)
Dept: ORTHOPEDIC SURGERY | Facility: CLINIC | Age: 65
End: 2025-10-15
Payer: MEDICARE

## (undated) DEVICE — GLOVE ORTHO 8   MSG9480

## (undated) DEVICE — COUNTER NDL 10 COUNT HLD 20 FOAM BLK SGL MAG

## (undated) DEVICE — TOWEL,OR,DSP,ST,BLUE,STD,6/PK,12PK/CS: Brand: MEDLINE

## (undated) DEVICE — 3 ML SYRINGE LUER-LOCK TIP: Brand: MONOJECT

## (undated) DEVICE — BASIC PACK: Brand: CONVERTORS

## (undated) DEVICE — 6 ML SYRINGE LUER-LOCK TIP: Brand: MONOJECT

## (undated) DEVICE — GAUZE,SPONGE,4"X4",12PLY,STERILE,LF,2'S: Brand: MEDLINE

## (undated) DEVICE — SUTURE SUTTAPE L40IN DIA1.3MM NONABSORBABLE WHT BLU L26.5MM AR7500

## (undated) DEVICE — NEEDLE SPNL 22GA L5IN BLK HUB S STL W/ QNCKE PNT W/OUT

## (undated) DEVICE — APPLICATOR MEDICATED 10.5 CC SOLUTION HI LT ORNG CHLORAPREP

## (undated) DEVICE — APPLICATOR MEDICATED 26 CC SOLUTION HI LT ORNG CHLORAPREP

## (undated) DEVICE — GLOVE ORANGE PI 8   MSG9080

## (undated) DEVICE — BANDAGE ADH W1XL3IN NAT FAB WVN FLX DURABLE N ADH PD SEAL

## (undated) DEVICE — GOWN SIRUS NONREIN XL W/TWL: Brand: MEDLINE INDUSTRIES, INC.

## (undated) DEVICE — BLADE,STAINLESS-STEEL,15,STRL,DISPOSABLE: Brand: MEDLINE

## (undated) DEVICE — HANDPIECE SET WITH BONE CLEANING TIP: Brand: INTERPULSE

## (undated) DEVICE — ELECTRODE SURG MPLR NEUT SELF ADH PT PLT MULTIGEN

## (undated) DEVICE — GLOVE ORANGE PI 7 1/2   MSG9075

## (undated) DEVICE — Z DISCONTINUED APPLICATOR SURG PREP 0.35OZ 2% CHG 70% ISO ALC W/ HI LT

## (undated) DEVICE — Device: Brand: NIPRO HYPODERMIC NEEDLE

## (undated) DEVICE — SUTURE STRATAFIX SYMMETRIC SZ 1 L18IN ABSRB VLT CT1 L36CM SXPP1A404

## (undated) DEVICE — NEEDLE HYPO 25GA L1.5IN BLU POLYPR HUB S STL REG BVL STR

## (undated) DEVICE — 3M™ MEDIPORE™ SOFT CLOTH TAPE, 4 INCH X 10 YARDS, 12 ROLLS/CASE, 2964: Brand: 3M™ MEDIPORE™

## (undated) DEVICE — COVER,LIGHT HANDLE,FLX,2/PK: Brand: MEDLINE INDUSTRIES, INC.

## (undated) DEVICE — DRESSING GZ XRFRM 4X4(25/BX 6BX/CS)

## (undated) DEVICE — 3M™ RED DOT™ MONITORING ELECTRODE WITH FOAM TAPE AND STICKY GEL 2560, 50/BAG, 20/CASE, 72/PLT: Brand: RED DOT™

## (undated) DEVICE — SOLUTION IV 1000ML 0.9% SOD CHL PH 5 INJ USP VIAFLX PLAS

## (undated) DEVICE — NEEDLE HYPO 18GA L1.5IN PNK POLYPR HUB S STL THN WALL FILL

## (undated) DEVICE — SOLUTION IV IRRIG POUR BRL 0.9% SODIUM CHL 2F7124

## (undated) DEVICE — GAUZE,SPONGE,4"X4",16PLY,STRL,LF,10/TRAY: Brand: MEDLINE

## (undated) DEVICE — 3M™ STERI-DRAPE™ U-DRAPE, LONG 1019: Brand: STERI-DRAPE™

## (undated) DEVICE — GOWN,SIRUS,NON REINFRCD,LARGE,SET IN SL: Brand: MEDLINE

## (undated) DEVICE — BLADE CLIPPER GEN PURP NS

## (undated) DEVICE — CLOTH PREP W7.5XL7.5IN 2% CHG SKIN ALC AND RNS FREE

## (undated) DEVICE — GLOVE SURG SZ 8 L11.2IN FNGR THK12.7MIL CUF THK9.7MIL BRN

## (undated) DEVICE — GAUZE,SPONGE,4"X4",16PLY,XRAY,STRL,LF: Brand: MEDLINE

## (undated) DEVICE — Z INACTIVE USE 2855128 SPONGE GZ 16 PLY WVN COT 4INX4IN  HHH

## (undated) DEVICE — 3M™ IOBAN™ 2 ANTIMICROBIAL INCISE DRAPE 6640EZ: Brand: IOBAN™ 2

## (undated) DEVICE — GOWN SURG XL LNG LEN SPUNBOND REINF VELC TIE LEV 4 IMPERV

## (undated) DEVICE — DRESSING HYDROFIBER AQUACEL AG ADVANTAGE 3.5X12 IN

## (undated) DEVICE — SUTURE PROL SZ 3-0 L18IN NONABSORBABLE BLU L19MM PS-2 3/8 8687H

## (undated) DEVICE — PREP TRAY 10X5X2: Brand: MEDLINE INDUSTRIES, INC.

## (undated) DEVICE — 4-PORT MANIFOLD: Brand: NEPTUNE 2

## (undated) DEVICE — GARMENT COMPR STD FOR 17IN CALF UNIF THER FLOTRN

## (undated) DEVICE — PADDING CAST 4 YDX3 IN COTTON NS WBRL

## (undated) DEVICE — TIBURON EXTREMITY SHEET: Brand: CONVERTORS

## (undated) DEVICE — 12 ML SYRINGE,LUER-LOCK TIP: Brand: MONOJECT

## (undated) DEVICE — CHLORAPREP 26ML ORANGE

## (undated) DEVICE — 20 ML SYRINGE REGULAR TIP: Brand: MONOJECT

## (undated) DEVICE — BANDAGE,GAUZE,BULKEE II,4.5"X4.1YD,STRL: Brand: MEDLINE

## (undated) DEVICE — SUTURE NONABSORBABLE MONOFILAMENT 4-0 PS-2 18 IN BLU PROLENE 8682H

## (undated) DEVICE — NEEDLE HYPO 18GA L1.5IN PNK POLYPR HUB S STL REG BVL STR

## (undated) DEVICE — CART DISP LID TRANSPOSAL

## (undated) DEVICE — BANDAGE COMPR W4XL108IN WHT LAYERED NO CLSR SYN RUB ESMARCH

## (undated) DEVICE — INTENDED FOR TISSUE SEPARATION, AND OTHER PROCEDURES THAT REQUIRE A SHARP SURGICAL BLADE TO PUNCTURE OR CUT.: Brand: BARD-PARKER ® STAINLESS STEEL BLADES

## (undated) DEVICE — 5-IN-1 BARBED CONNECTOR POLYPROPYLENE 3/16 - 9/16 IN. (5 - 14.3 MM): Brand: ARGYLE

## (undated) DEVICE — HOOK LOCK LATEX FREE ELASTIC BANDAGE 3INX5YD

## (undated) DEVICE — DRAPE SURG W88XL116IN SMS BODY SPL ORTH N FEN REINF FLD PCH

## (undated) DEVICE — SYRINGE MEDICAL 3ML CLEAR PLASTIC STANDARD NON CONTROL LUERLOCK TIP DISPOSABLE

## (undated) DEVICE — PEN: MARKING STD 100/CS: Brand: MEDICAL ACTION INDUSTRIES

## (undated) DEVICE — PROBE ABLAT 90DEG ASPIR MULTIPORT BPLR RF 1 PC ELECTRD ERGO

## (undated) DEVICE — STANDARD HYPODERMIC NEEDLE,POLYPROPYLENE HUB: Brand: MONOJECT

## (undated) DEVICE — SLEEVE TRAC SPANDEX LAT W/ 4IN COBAN SUPERFICIAL RAD NRV PD

## (undated) DEVICE — SLING ARM 9 1/2X20IN L MULTIPAK

## (undated) DEVICE — SOLUTION IRRIG 1000ML 0.9% SOD CHL USP POUR PLAS BTL

## (undated) DEVICE — GLOVE SURG 7 LTX TRIFLEX WIDE FINGER PWDR

## (undated) DEVICE — BASIC DOUBLE BASIN 2-LF: Brand: MEDLINE INDUSTRIES, INC.

## (undated) DEVICE — TOWEL OR BLUEE 16X26IN ST 8 PACK ORB08 16X26ORTWL

## (undated) DEVICE — SOLUTION IRRIG 1000ML 09% SOD CHL USP PIC PLAS CONTAINER

## (undated) DEVICE — TUBING, SUCTION, 3/16" X 10', STRAIGHT: Brand: MEDLINE

## (undated) DEVICE — CVD CANNULA

## (undated) DEVICE — TUBING PMP L16FT MAIN DISP FOR AR-6400 AR-6475

## (undated) DEVICE — CANNULA ARTHSCP L7CM DIA7MM TRNSLUC THRD FLX W/ NO SQUIRT

## (undated) DEVICE — SLING ARM L L165IN D75IN WHT POLY MESH ENVELOP MTL SIDE

## (undated) DEVICE — DRAPE,SHOULDER,ORTHOMAX,W/POUCH,5/CS: Brand: MEDLINE

## (undated) DEVICE — 450 ML BOTTLE OF 0.05% CHLORHEXIDINE GLUCONATE IN 99.95% STERILE WATER FOR IRRIGATION, USP AND APPLICATOR.: Brand: IRRISEPT ANTIMICROBIAL WOUND LAVAGE

## (undated) DEVICE — MEDI-VAC NON-CONDUCTIVE SUCTION TUBING: Brand: CARDINAL HEALTH

## (undated) DEVICE — 1810 FOAM BLOCK NEEDLE COUNTER: Brand: DEVON

## (undated) DEVICE — 3 BONE CEMENT MIXER: Brand: MIXEVAC

## (undated) DEVICE — BLADE SHV L13CM DIA4MM DBL CUT COOLCUT

## (undated) DEVICE — NEEDLE SPNL L3.5IN PNK HUB S STL REG WALL FIT STYL W/ QNCKE

## (undated) DEVICE — NEEDLE SPNL 22GA L3.5IN BLK HUB S STL REG WALL FIT STYL

## (undated) DEVICE — BANDAGE,SELF ADHRNT,COFLEX,4"X5YD,STRL: Brand: COLABEL

## (undated) DEVICE — TOTAL KNEE PACK: Brand: MEDLINE INDUSTRIES, INC.

## (undated) DEVICE — WIPES SKIN CLOTH READYPREP 9 X 10.5 IN 2% CHLORHEX GLUCONATE CHG PREOP

## (undated) DEVICE — NEEDLE SPNL 25GA L2IN BLU SHT NEO LUM PUNC DISP